# Patient Record
Sex: MALE | Race: WHITE
[De-identification: names, ages, dates, MRNs, and addresses within clinical notes are randomized per-mention and may not be internally consistent; named-entity substitution may affect disease eponyms.]

---

## 2017-05-08 ENCOUNTER — HOSPITAL ENCOUNTER (OUTPATIENT)
Dept: HOSPITAL 41 - JD.SDS | Age: 77
Discharge: HOME | End: 2017-05-08
Attending: SURGERY
Payer: MEDICARE

## 2017-05-08 VITALS — SYSTOLIC BLOOD PRESSURE: 142 MMHG | DIASTOLIC BLOOD PRESSURE: 88 MMHG

## 2017-05-08 DIAGNOSIS — Z85.51: ICD-10-CM

## 2017-05-08 DIAGNOSIS — D12.3: ICD-10-CM

## 2017-05-08 DIAGNOSIS — E78.00: ICD-10-CM

## 2017-05-08 DIAGNOSIS — E11.9: ICD-10-CM

## 2017-05-08 DIAGNOSIS — K44.9: ICD-10-CM

## 2017-05-08 DIAGNOSIS — K21.0: ICD-10-CM

## 2017-05-08 DIAGNOSIS — D50.9: ICD-10-CM

## 2017-05-08 DIAGNOSIS — M54.9: ICD-10-CM

## 2017-05-08 DIAGNOSIS — K57.30: ICD-10-CM

## 2017-05-08 DIAGNOSIS — Z90.49: ICD-10-CM

## 2017-05-08 DIAGNOSIS — G89.29: ICD-10-CM

## 2017-05-08 DIAGNOSIS — I69.398: ICD-10-CM

## 2017-05-08 DIAGNOSIS — I48.91: ICD-10-CM

## 2017-05-08 DIAGNOSIS — E78.5: ICD-10-CM

## 2017-05-08 DIAGNOSIS — K31.89: ICD-10-CM

## 2017-05-08 DIAGNOSIS — Z79.899: ICD-10-CM

## 2017-05-08 DIAGNOSIS — Z98.890: ICD-10-CM

## 2017-05-08 DIAGNOSIS — D12.2: ICD-10-CM

## 2017-05-08 DIAGNOSIS — Z87.891: ICD-10-CM

## 2017-05-08 DIAGNOSIS — K29.50: Primary | ICD-10-CM

## 2017-05-08 DIAGNOSIS — Z79.84: ICD-10-CM

## 2017-05-08 DIAGNOSIS — Z79.01: ICD-10-CM

## 2017-05-08 DIAGNOSIS — I10: ICD-10-CM

## 2017-05-08 DIAGNOSIS — Z86.010: ICD-10-CM

## 2017-05-08 PROCEDURE — 43239 EGD BIOPSY SINGLE/MULTIPLE: CPT

## 2017-05-08 PROCEDURE — 0DB68ZX EXCISION OF STOMACH, VIA NATURAL OR ARTIFICIAL OPENING ENDOSCOPIC, DIAGNOSTIC: ICD-10-PCS | Performed by: SURGERY

## 2017-05-08 PROCEDURE — 0DB98ZX EXCISION OF DUODENUM, VIA NATURAL OR ARTIFICIAL OPENING ENDOSCOPIC, DIAGNOSTIC: ICD-10-PCS | Performed by: SURGERY

## 2017-05-08 PROCEDURE — 45380 COLONOSCOPY AND BIOPSY: CPT

## 2017-05-08 PROCEDURE — 0DBK8ZZ EXCISION OF ASCENDING COLON, VIA NATURAL OR ARTIFICIAL OPENING ENDOSCOPIC: ICD-10-PCS | Performed by: SURGERY

## 2017-05-08 PROCEDURE — 0DBL8ZZ EXCISION OF TRANSVERSE COLON, VIA NATURAL OR ARTIFICIAL OPENING ENDOSCOPIC: ICD-10-PCS | Performed by: SURGERY

## 2017-05-08 PROCEDURE — 88305 TISSUE EXAM BY PATHOLOGIST: CPT

## 2017-05-08 PROCEDURE — 82962 GLUCOSE BLOOD TEST: CPT

## 2017-05-08 PROCEDURE — 45385 COLONOSCOPY W/LESION REMOVAL: CPT

## 2017-05-08 NOTE — PCM.OPNOTE
- General Post-Op/Procedure Note


Date of Surgery/Procedure: 05/08/17


Operative Procedure(s): EGD with bx ./colonosocopy polypectomy and bx


Pre Op Diagnosis: anemia


Post-Op Diagnosis: Same


Anesthesia Technique: MAC


Primary Surgeon: Mike Frias


Complications: None


Condition: Good

## 2017-05-08 NOTE — PCM.PREANE
Preanesthetic Assessment





- Anesthesia/Transfusion/Family Hx


Anesthesia History: Prior Anesthesia Without Reaction


Family History of Anesthesia Reaction: No


Transfusion History: No Prior Transfusion(s)


Type of Transfusion Reactions: Reports: Unknown


Intubation History: Unknown





- Review of Systems


General: No Symptoms


Pulmonary: No Symptoms


Cardiovascular: Other (HTN, HLD, AFIB)


Gastrointestinal: No symptoms


Neurological: No Symptoms, Other (hx of stroke with no deficits )


Other: Reports: None, Diabetes





- Physical Assessment


NPO Status Date: 05/07/17


NPO Status Time: 18:30


Pulse: 89


O2 Sat by Pulse Oximetry: 95


Respiratory Rate: 16


Blood Pressure: 150/79


Temperature: 36.2 C


Height: 1.68 m


Weight: 71.214 kg


ASA Class: 3


Mental Status: Alert & Oriented x3


Dentition: Reports: Normal Dentition


Thyro-Mental Finger Breadths: 3


Mouth Opening Finger Breadths: 3


ROM/Head Extension: Full


Lungs: Clear to auscultation, Normal respiratory effort


Cardiovascular: No Murmurs, Irregular Rhythm (afib)





- Lab


Values: 





 Laboratory Last Values











POC Glucose  126 mg/dL ()  H  05/08/17  07:23    














- Allergies


Allergies/Adverse Reactions: 


 Allergies











Allergy/AdvReac Type Severity Reaction Status Date / Time


 


No Known Allergies Allergy   Verified 01/29/16 15:09














- Blood


Blood Available: No


Product(s) Available: None





- Anesthesia Plan


Pre-Op Medication Ordered: None


Beta Blocker: Metoprolol (instructed to take metoprolol when he gets home)


Med Last Dose Date: 05/07/17


Med Last Dose Time: 08:00





- Acknowledgements


Anesthesia Type Planned: MAC


Pt an Appropriate Candidate for the Planned Anesthesia: Yes


Alternatives and Risks of Anesthesia Discussed w Pt/Guardian: Yes


Pt/Guardian Understands and Agrees with Anesthesia Plan: Yes





PreAnesthesia Questionnaire


Cardiovascular History: Reports: High cholesterol, Hypertension


Gastrointestinal History: Reports: GERD


Musculoskeletal History: Reports: Back pain, chronic


Psychiatric History: Reports: Depression


Endocrine/Metabolic History: Reports: Diabetes, type II


Oncologic (Cancer) History: Reports: Bladder





- Past Surgical History


GI Surgical History: Reports: Colonoscopy, Hernia, inguinal





- SUBSTANCE USE


Smoking Status *Q: Former Smoker


Tobacco Use Within Last Twelve Months: Cigarettes


Recreational Drug Use History: No





- HOME MEDS


Home Medications: 


 Home Meds





Gabapentin [Neurontin] 300 mg PO BEDTIME 08/20/15 [History]


Metoprolol Succinate [Toprol XL] 100 mg PO DAILY 08/20/15 [History]


Omeprazole [Prilosec] 20 mg PO DAILY 08/20/15 [History]


Simvastatin [Zocor] 80 mg PO BEDTIME 08/20/15 [History]


metFORMIN [Glucophage] 1,000 mg PO BID 08/20/15 [History]


Rivaroxaban [Xarelto] 20 mg PO QAM #30 tablet 01/23/16 [Rx]











- CURRENT (IN HOUSE) MEDS


Current Meds: 





 Current Medications





Lactated Ringer's (Ringers, Lactated)  1,000 mls @ 125 mls/hr IV ASDIRECTED RODRIGO


   Stop: 05/08/17 23:00


Lidocaine/Sodium Bicarbonate (Buffered Lidocaine 1% In Ns 8.4%)  0.25 ml IV 

ONETIME PRN


   PRN Reason: Prior to IV Start


   Stop: 05/08/17 18:00


Sodium Chloride (Saline Flush)  10 ml FLUSH ASDIRECTED PRN


   PRN Reason: Keep Vein Open


   Stop: 05/08/17 18:00

## 2017-05-09 NOTE — OR
DATE OF OPERATION:  05/08/2017

 

SURGEON:  Mike Frias MD

 

PREOPERATIVE DIAGNOSIS:

History of colonic polyps, anemia.

 

POSTOPERATIVE DIAGNOSIS:

History of colonic polyps, anemia.

 

OPERATION PERFORMED:

1. Colonoscopy to the cecum.

2. Polypectomy x3 with biopsy of the larger polyps.  All polyps were

    completely removed and all polyps that were removed were completely removed

    and retrieved.

 

FINDINGS:

A sigmoid diverticulosis, a subcentimeter polyp in the ascending colon, 1 in the

transverse colon and the second also in the transverse colon, and a third was at

the splenic flexure.  This was a polyp draped over a fold, it was sessile,

encompassing half the diameter of the colon.

 

ANESTHESIA:

The procedure was done under IV sedation.

 

DESCRIPTION OF PROCEDURE:

The patient was taken to the operating room, placed in a supine position,

connected to monitoring equipment, and given IV sedation.  Upper GI endoscopy

was done and IV sedation continued for colonoscopy.  He was placed in left

lateral position.  The perianal area was inspected and was normal.  Rectal exam

showed good sphincter tone.  A Video Olympus colonoscope was introduced into the

rectum and threaded up without problem to the cecum, where the appendicular

orifice was seen.  Prep was adequate.  [place] [employer] score grade B

throughout the colon.  The scope was slowly withdrawn showing the cecum,

ascending colon, transverse colon, descending colon, sigmoid colon, and rectum.

A small diminutive polyp was noted at the ascending colon and this was removed

by cold biopsy forceps.  A second polyp was noted in the transverse colon, it

was subcentimeter and this was removed by cautery snare.  A small clip was

placed on the polypectomy site.  The second polyp was also more distal in the

transverse colon.  This was removed by cautery snare and retrieved as the other

polyp was.  Finally, a fourth polyp was noted in the splenic flexure.  It was

quite large encompassing about a half the diameter of the lumen of the colon and

it appeared to have indentation in the center.  It was elected to biopsy the

lesion.  This was sent to pathology in a labeled container.  The patient

tolerated procedure and was sent to recovery room in a stable condition.  All

polyps sent in the labeled container and the patient will be followed up in the

clinic.

 

ESTIMATED BLOOD LOSS:

 

 

DD:  05/08/2017 09:25:37

DT:  05/09/2017 01:38:05  MMODAL

Job #:  [id number]

## 2017-05-09 NOTE — OR
DATE OF OPERATION:  05/08/2017

 

SURGEON:  Mike Frias MD

 

PREOPERATIVE DIAGNOSIS:

Anemia.

 

POSTOPERATIVE DIAGNOSIS:

Anemia.

 

OPERATION PERFORMED:

Esophagogastroduodenoscopy with biopsy.

 

FINDINGS:

The second portion of the duodenum, duodenal bulb, pyloric channel normal.

Antrum showed a rope-like ridge of tissue, which was biopsied.  Some chronic

esophagitis was noted in the antrum.  J-maneuver showed a sliding hiatal hernia

with GE junction located at 38 cm with evidence of chronic esophagitis.  The Z-

line; however, was sharp.  There is tissue gastric contents unusual in nature

that was in the fundus and this was biopsied and sent separately.  The rest

esophagus was unremarkable as the scope was withdrawn.

 

DESCRIPTION OF PROCEDURE:

The patient was taken to the operating room, placed in the supine position,

connected to monitoring equipment, given IV sedation, and placed in left lateral

position.  Bite block was inserted.  Video Olympus gastroscope placed in a

posterior oropharynx.  Under direct vision, threaded past the cricopharyngeus,

down the esophagus, into the stomach.  The stomach was insufflated.  The scope

passed through the pylorus and the second portion of the duodenum.  Second

portion of the duodenum, duodenal bulb was unremarkable as was the pyloric

channel.  There is a rope of tissue that extended along the lesser curvature,

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  The

antrum showed just blunting of the mucosa suggesting chronic gastritis.  J-

maneuver was performed showing hiatal hernia.  There was some tissue up in the

fundus, which was unidentifiable.  It looked like it was part of the gastric

contents, this was biopsied.  The scope was withdrawn at the GE junction, which

was viewed as described above and the rest of the esophagus was viewed as the

scope withdrawn.  The patient tolerated the procedure.  Specimens sent to

pathology in a labeled container and IV sedation will be continued for

colonoscopy.

 

ANESTHESIA:

MAC.

 

ESTIMATED BLOOD LOSS:

 

 

DD:  05/08/2017 08:30:53

DT:  05/08/2017 19:07:32  MMODAL

Job #:  [id number]

## 2017-06-10 ENCOUNTER — HOSPITAL ENCOUNTER (EMERGENCY)
Dept: HOSPITAL 41 - JD.ED | Age: 77
LOS: 1 days | Discharge: HOME | End: 2017-06-11
Payer: MEDICARE

## 2017-06-10 DIAGNOSIS — E78.00: ICD-10-CM

## 2017-06-10 DIAGNOSIS — Z79.84: ICD-10-CM

## 2017-06-10 DIAGNOSIS — E11.9: ICD-10-CM

## 2017-06-10 DIAGNOSIS — Z87.891: ICD-10-CM

## 2017-06-10 DIAGNOSIS — I48.91: ICD-10-CM

## 2017-06-10 DIAGNOSIS — K21.9: ICD-10-CM

## 2017-06-10 DIAGNOSIS — R07.9: Primary | ICD-10-CM

## 2017-06-10 DIAGNOSIS — I10: ICD-10-CM

## 2017-06-10 DIAGNOSIS — G89.29: ICD-10-CM

## 2017-06-10 DIAGNOSIS — Z79.899: ICD-10-CM

## 2017-06-10 DIAGNOSIS — M54.9: ICD-10-CM

## 2017-06-10 PROCEDURE — 96361 HYDRATE IV INFUSION ADD-ON: CPT

## 2017-06-10 PROCEDURE — 85730 THROMBOPLASTIN TIME PARTIAL: CPT

## 2017-06-10 PROCEDURE — 99285 EMERGENCY DEPT VISIT HI MDM: CPT

## 2017-06-10 PROCEDURE — 84484 ASSAY OF TROPONIN QUANT: CPT

## 2017-06-10 PROCEDURE — 93005 ELECTROCARDIOGRAM TRACING: CPT

## 2017-06-10 PROCEDURE — 85610 PROTHROMBIN TIME: CPT

## 2017-06-10 PROCEDURE — 81001 URINALYSIS AUTO W/SCOPE: CPT

## 2017-06-10 PROCEDURE — 36415 COLL VENOUS BLD VENIPUNCTURE: CPT

## 2017-06-10 PROCEDURE — 85025 COMPLETE CBC W/AUTO DIFF WBC: CPT

## 2017-06-10 PROCEDURE — 71010: CPT

## 2017-06-10 PROCEDURE — 96360 HYDRATION IV INFUSION INIT: CPT

## 2017-06-10 PROCEDURE — 80053 COMPREHEN METABOLIC PANEL: CPT

## 2017-06-10 PROCEDURE — P9612 CATHETERIZE FOR URINE SPEC: HCPCS

## 2017-06-10 RX ADMIN — NITROGLYCERIN PRN MG: 0.4 TABLET SUBLINGUAL at 20:08

## 2017-06-10 RX ADMIN — NITROGLYCERIN PRN MG: 0.4 TABLET SUBLINGUAL at 20:14

## 2017-06-10 RX ADMIN — NITROGLYCERIN PRN MG: 0.4 TABLET SUBLINGUAL at 20:21

## 2017-06-11 VITALS — SYSTOLIC BLOOD PRESSURE: 162 MMHG | DIASTOLIC BLOOD PRESSURE: 85 MMHG

## 2017-06-12 NOTE — CR
Chest: Frontal view of the chest was obtained.

 

Comparison: No prior chest x-ray.

 

Heart is enlarged.  Pulmonary vessels are mildly congested.  Lungs 

otherwise are clear.  Bony structures are grossly intact.  Probable 

bilateral chronic rotator cuff tears.

 

Impression:

1.  Cardiomegaly with mild pulmonary vascular congestion.

2.  Incidental bone findings.

 

Diagnostic code #3

## 2017-06-20 ENCOUNTER — HOSPITAL ENCOUNTER (EMERGENCY)
Dept: HOSPITAL 41 - JD.ED | Age: 77
Discharge: HOME | End: 2017-06-20
Payer: MEDICARE

## 2017-06-20 VITALS — DIASTOLIC BLOOD PRESSURE: 97 MMHG | SYSTOLIC BLOOD PRESSURE: 168 MMHG

## 2017-06-20 DIAGNOSIS — I11.0: Primary | ICD-10-CM

## 2017-06-20 DIAGNOSIS — E78.00: ICD-10-CM

## 2017-06-20 DIAGNOSIS — N41.0: ICD-10-CM

## 2017-06-20 DIAGNOSIS — Z90.49: ICD-10-CM

## 2017-06-20 DIAGNOSIS — K21.9: ICD-10-CM

## 2017-06-20 DIAGNOSIS — E11.9: ICD-10-CM

## 2017-06-20 DIAGNOSIS — I50.9: ICD-10-CM

## 2017-06-20 DIAGNOSIS — Z79.84: ICD-10-CM

## 2017-06-20 DIAGNOSIS — F32.9: ICD-10-CM

## 2017-06-20 DIAGNOSIS — J44.9: ICD-10-CM

## 2017-06-20 DIAGNOSIS — F17.210: ICD-10-CM

## 2017-06-20 DIAGNOSIS — Z79.899: ICD-10-CM

## 2017-06-20 PROCEDURE — 83690 ASSAY OF LIPASE: CPT

## 2017-06-20 PROCEDURE — 96361 HYDRATE IV INFUSION ADD-ON: CPT

## 2017-06-20 PROCEDURE — 84484 ASSAY OF TROPONIN QUANT: CPT

## 2017-06-20 PROCEDURE — 74177 CT ABD & PELVIS W/CONTRAST: CPT

## 2017-06-20 PROCEDURE — 71020: CPT

## 2017-06-20 PROCEDURE — 96374 THER/PROPH/DIAG INJ IV PUSH: CPT

## 2017-06-20 PROCEDURE — 81001 URINALYSIS AUTO W/SCOPE: CPT

## 2017-06-20 PROCEDURE — 85025 COMPLETE CBC W/AUTO DIFF WBC: CPT

## 2017-06-20 PROCEDURE — 36415 COLL VENOUS BLD VENIPUNCTURE: CPT

## 2017-06-20 PROCEDURE — 80053 COMPREHEN METABOLIC PANEL: CPT

## 2017-06-20 PROCEDURE — 83880 ASSAY OF NATRIURETIC PEPTIDE: CPT

## 2017-06-20 PROCEDURE — 93005 ELECTROCARDIOGRAM TRACING: CPT

## 2017-06-20 PROCEDURE — 99285 EMERGENCY DEPT VISIT HI MDM: CPT

## 2017-06-20 NOTE — EDM.PDOC
ED HPI GENERAL MEDICAL PROBLEM





- General


Chief Complaint: Abdominal Pain


Stated Complaint: ABDOMINAL PAINS


Time Seen by Provider: 06/20/17 19:39


Source of Information: Reports: Patient, Family (Son), RN Notes Reviewed


History Limitations: Reports: Other (The patient is a poor historian. Possible 

dementia.)





- History of Present Illness


INITIAL COMMENTS - FREE TEXT/NARRATIVE: 





The patient states that he has had upper abdominal pain since this morning, but 

then indicated with his hand the location of his pain was his chest. He is 

unable to describe the character of the pain and he states that there are no 

modifiers. He believes that it is been waxing and waning. He believes that he 

has had similar pain on and off for the past couple of months.





He denies recent fever, but states that he has had nausea or vomiting. No 

recent constipation, diarrhea, or urinary symptoms.





The patient cannot recall who his PCP is.


  ** left upper abdomen/chest area


Pain Score (Numeric/FACES): 10





- Related Data


 Allergies











Allergy/AdvReac Type Severity Reaction Status Date / Time


 


No Known Allergies Allergy   Verified 06/20/17 19:50











Home Meds: 


 Home Meds





Gabapentin [Neurontin] 300 mg PO BEDTIME 08/20/15 [History]


Metoprolol Succinate [Toprol XL] 100 mg PO DAILY 08/20/15 [History]


Omeprazole [Prilosec] 20 mg PO DAILY 08/20/15 [History]


Simvastatin [Zocor] 80 mg PO BEDTIME 08/20/15 [History]


metFORMIN [Glucophage] 1,000 mg PO BID 08/20/15 [History]


Rivaroxaban [Xarelto] 20 mg PO QAM #30 tablet 01/23/16 [Rx]


Sulfamethoxazole/Trimethoprim [Bactrim Ds Tablet] 1 tab PO Q12H #12 tablet 06/20 /17 [Rx]











Past Medical History


HEENT History: Reports: Impaired Vision


Cardiovascular History: Reports: Heart Failure, High Cholesterol, Hypertension


Respiratory History: Reports: COPD


Gastrointestinal History: Reports: GERD


Musculoskeletal History: Reports: Back Pain, Chronic


Psychiatric History: Reports: Depression


Endocrine/Metabolic History: Reports: Diabetes, Type II


Oncologic (Cancer) History: Reports: Bladder





- Past Surgical History


GI Surgical History: Reports: Appendectomy, Cholecystectomy, Colonoscopy, Hernia

, Inguinal


Musculoskeletal Surgical History: Reports: Shoulder Surgery (left)





Social & Family History





- Family History


Family Medical History: Noncontributory





- Tobacco Use


Smoking Status *Q: Current Every Day Smoker


Years of Tobacco use: 60


Packs/Tins Daily: 0.2


Packs/Tins Daily Comment: Down from 1 ppd





- Caffeine Use


Caffeine Use: Reports: Coffee





- Alcohol Use


Alcohol Use History: Yes


Alcohol Use Frequency: Rarely





- Recreational Drug Use


Recreational Drug Use: No





- Living Situation & Occupation


Living situation: Reports: , Alone


Occupation: Retired





ED ROS GENERAL





- Review of Systems


Review Of Systems: See Below


Constitutional: Reports: No Symptoms


HEENT: Reports: No Symptoms


Respiratory: Reports: No Symptoms


Cardiovascular: Reports: No Symptoms


Endocrine: Reports: No Symptoms


GI/Abdominal: Reports: No Symptoms


: Reports: No Symptoms


Musculoskeletal: Reports: No Symptoms


Skin: Reports: No Symptoms


Neurological: Reports: No Symptoms


Psychiatric: Reports: No Symptoms


Hematologic/Lymphatic: Reports: No Symptoms


Immunologic: Reports: No Symptoms





ED EXAM, GENERAL





- Physical Exam


Exam: See Below


Exam Limited By: No Limitations


General Appearance: Alert, WD/WN, No Apparent Distress


Eye Exam: Bilateral Eye: Normal Inspection


Ears: Normal External Exam, Hearing Grossly Normal


Nose: Normal Inspection, No Blood


Throat/Mouth: Normal Inspection, Normal Lips, Normal Voice, No Airway Compromise


Head: Atraumatic, Normocephalic


Neck: Normal Inspection, Full Range of Motion


Respiratory/Chest: No Respiratory Distress, Lungs Clear, Normal Breath Sounds, 

No Accessory Muscle Use, Chest Non-Tender


Cardiovascular: Normal Peripheral Pulses, Regular Rate, Rhythm, No Edema, No 

Gallop, No JVD, No Murmur, No Rub


Peripheral Pulses: 4+: Radial (L), Radial (R)


GI/Abdominal: Normal Bowel Sounds, Soft, Non-Tender, No Organomegaly, No 

Distention, No Abnormal Bruit, No Mass


Back Exam: Normal Inspection, Full Range of Motion.  No: CVA Tenderness (L), 

CVA Tenderness (R)


Extremities: Normal Inspection, Normal Range of Motion, No Pedal Edema, Normal 

Capillary Refill


Neurological: Alert, No Motor/Sensory Deficits, Memory Loss Recent Events


Psychiatric: Flat Affect


Skin Exam: Warm, Dry, Intact, Normal Color, No Rash


Lymphatic: No Adenopathy





EKG INTERPRETATION


EKG Date: 06/20/17


Time: 19:54


Rhythm: A-Fib


Rate (Beats/Min): 82


Axis: Normal


P-Wave: Absent


QRS: Normal


ST-T: Normal


QT: Normal


Comparison: No Change (6/10/2017)





Course





- Vital Signs


Last Recorded V/S: 


 Last Vital Signs











Temp  36.7 C   06/20/17 19:39


 


Pulse  86   06/20/17 19:39


 


Resp  28 H  06/20/17 19:39


 


BP  168/97 H  06/20/17 19:39


 


Pulse Ox  94 L  06/20/17 19:39














- Orders/Labs/Meds


Orders: 


 Active Orders 24 hr











 Category Date Time Status


 


 EKG Documentation Completion [RC] STAT Care  06/20/17 19:57 Active


 


 Abdomen Pelvis w Cont [CT] Stat Exams  06/20/17 20:05 Taken


 


 Chest 2V [CR] Stat Exams  06/20/17 20:06 Taken


 


 Sodium Chloride 0.9% [Normal Saline] 1,000 ml Med  06/20/17 20:15 Active





 IV ASDIRECTED   








 Medication Orders





Sodium Chloride (Normal Saline)  1,000 mls @ 150 mls/hr IV ASDIRECTED RODRIGO


   Last Admin: 06/20/17 20:24  Dose: 150 mls/hr








Labs: 


 Laboratory Tests











  06/20/17 06/20/17 06/20/17 Range/Units





  20:05 20:05 20:07 


 


WBC  11.36 H    (4.23-9.07)  K/mm3


 


RBC  5.63    (4.63-6.08)  M/mm3


 


Hgb  10.3 L    (13.7-17.5)  gm/L


 


Hct  34.0 L    (40.1-51.0)  %


 


MCV  60.4 L    (79.0-92.2)  fl


 


MCH  18.3 L    (25.7-32.2)  pg


 


MCHC  30.3 L    (32.2-35.5)  g/dl


 


RDW Std Deviation  44.8 H    (35.1-43.9)  fL


 


Plt Count  496 H    (163-337)  K/mm3


 


MPV  8.2 L    (9.4-12.3)  fl


 


Neutrophils % (Manual)  70 H    (40-60)  %


 


Band Neutrophils %  0    (0-10)  %


 


Lymphocytes % (Manual)  23    (20-40)  %


 


Atypical Lymphs %  0    %


 


Monocytes % (Manual)  3    (2-10)  %


 


Eosinophils % (Manual)  3    (0.8-7.0)  %


 


Basophils % (Manual)  1    (0.2-1.2)  


 


Platelet Estimate  Adequate    


 


Plt Morphology Comment  Normal    


 


Hypochromasia  2+ moderate    


 


Poikilocytosis  2+ moderate    


 


Anisocytosis  2+ moderate    


 


Microcytosis  3+ marked    


 


RBC Morph Comment  Not Reportable    


 


Sodium   137   (136-145)  mEq/L


 


Potassium   3.8   (3.5-5.1)  mEq/L


 


Chloride   101   ()  mEq/L


 


Carbon Dioxide   25   (21-32)  mEq/L


 


Anion Gap   14.8   (5-15)  


 


BUN   20 H   (7-18)  mg/dL


 


Creatinine   1.3   (0.7-1.3)  mg/dL


 


Est Cr Clr Drug Dosing   TNP   


 


Estimated GFR (MDRD)   53   (>60)  mL/min


 


BUN/Creatinine Ratio   15.4   (14-18)  


 


Glucose   108   ()  mg/dL


 


Calcium   9.2   (8.5-10.1)  mg/dL


 


Total Bilirubin   1.5 H   (0.2-1.0)  mg/dL


 


AST   14 L   (15-37)  U/L


 


ALT   17   (16-63)  U/L


 


Alkaline Phosphatase   113   ()  U/L


 


Troponin I   < 0.017   (0.00-0.056)  ng/mL


 


B-Natriuretic Peptide    812 H  (0-100)  pg/mL


 


Total Protein   8.5 H   (6.4-8.2)  g/dl


 


Albumin   3.8   (3.4-5.0)  g/dl


 


Globulin   4.7   gm/dL


 


Albumin/Globulin Ratio   0.8 L   (1-2)  


 


Lipase   83   ()  U/L


 


Urine Color     (Yellow)  


 


Urine Appearance     (Clear)  


 


Urine pH     (5.0-8.0)  


 


Ur Specific Gravity     (1.005-1.030)  


 


Urine Protein     (Negative)  


 


Urine Glucose (UA)     (Negative)  


 


Urine Ketones     (Negative)  


 


Urine Occult Blood     (Negative)  


 


Urine Nitrite     (Negative)  


 


Urine Bilirubin     (Negative)  


 


Urine Urobilinogen     (0.2-1.0)  


 


Ur Leukocyte Esterase     (Negative)  


 


Urine RBC     (0-5)  /hpf


 


Urine WBC     (0-5)  /hpf


 


Ur Epithelial Cells     (0-5)  /hpf


 


Urine Bacteria     (FEW)  /hpf


 


Urine Mucus     (FEW)  /hpf














  06/20/17 Range/Units





  22:40 


 


WBC   (4.23-9.07)  K/mm3


 


RBC   (4.63-6.08)  M/mm3


 


Hgb   (13.7-17.5)  gm/L


 


Hct   (40.1-51.0)  %


 


MCV   (79.0-92.2)  fl


 


MCH   (25.7-32.2)  pg


 


MCHC   (32.2-35.5)  g/dl


 


RDW Std Deviation   (35.1-43.9)  fL


 


Plt Count   (163-337)  K/mm3


 


MPV   (9.4-12.3)  fl


 


Neutrophils % (Manual)   (40-60)  %


 


Band Neutrophils %   (0-10)  %


 


Lymphocytes % (Manual)   (20-40)  %


 


Atypical Lymphs %   %


 


Monocytes % (Manual)   (2-10)  %


 


Eosinophils % (Manual)   (0.8-7.0)  %


 


Basophils % (Manual)   (0.2-1.2)  


 


Platelet Estimate   


 


Plt Morphology Comment   


 


Hypochromasia   


 


Poikilocytosis   


 


Anisocytosis   


 


Microcytosis   


 


RBC Morph Comment   


 


Sodium   (136-145)  mEq/L


 


Potassium   (3.5-5.1)  mEq/L


 


Chloride   ()  mEq/L


 


Carbon Dioxide   (21-32)  mEq/L


 


Anion Gap   (5-15)  


 


BUN   (7-18)  mg/dL


 


Creatinine   (0.7-1.3)  mg/dL


 


Est Cr Clr Drug Dosing   


 


Estimated GFR (MDRD)   (>60)  mL/min


 


BUN/Creatinine Ratio   (14-18)  


 


Glucose   ()  mg/dL


 


Calcium   (8.5-10.1)  mg/dL


 


Total Bilirubin   (0.2-1.0)  mg/dL


 


AST   (15-37)  U/L


 


ALT   (16-63)  U/L


 


Alkaline Phosphatase   ()  U/L


 


Troponin I   (0.00-0.056)  ng/mL


 


B-Natriuretic Peptide   (0-100)  pg/mL


 


Total Protein   (6.4-8.2)  g/dl


 


Albumin   (3.4-5.0)  g/dl


 


Globulin   gm/dL


 


Albumin/Globulin Ratio   (1-2)  


 


Lipase   ()  U/L


 


Urine Color  Yellow  (Yellow)  


 


Urine Appearance  Clear  (Clear)  


 


Urine pH  6.0  (5.0-8.0)  


 


Ur Specific Gravity  1.020  (1.005-1.030)  


 


Urine Protein  2+ H  (Negative)  


 


Urine Glucose (UA)  Negative  (Negative)  


 


Urine Ketones  Negative  (Negative)  


 


Urine Occult Blood  Negative  (Negative)  


 


Urine Nitrite  Negative  (Negative)  


 


Urine Bilirubin  Negative  (Negative)  


 


Urine Urobilinogen  1.0  (0.2-1.0)  


 


Ur Leukocyte Esterase  Negative  (Negative)  


 


Urine RBC  0-5  (0-5)  /hpf


 


Urine WBC  0-5  (0-5)  /hpf


 


Ur Epithelial Cells  0-5  (0-5)  /hpf


 


Urine Bacteria  Not seen  (FEW)  /hpf


 


Urine Mucus  Not seen  (FEW)  /hpf











Meds: 


Medications











Generic Name Dose Route Start Last Admin





  Trade Name Freq  PRN Reason Stop Dose Admin


 


Sodium Chloride  1,000 mls @ 150 mls/hr  06/20/17 20:15  06/20/17 20:24





  Normal Saline  IV   150 mls/hr





  ASDIRECTED RODRIGO   Administration














Discontinued Medications














Generic Name Dose Route Start Last Admin





  Trade Name Freq  PRN Reason Stop Dose Admin


 


Iopamidol  100 ml  06/20/17 22:30  06/20/17 22:30





  Isovue-370 (76%)  IVPUSH  06/20/17 22:31  100 ml





  ONETIME ONE   Administration


 


Ondansetron HCl  4 mg  06/20/17 20:04  06/20/17 20:24





  Zofran  IVPUSH  06/20/17 20:05  4 mg





  ONETIME ONE   Administration














- Radiology Interpretation


Free Text/Narrative:: 





Two-view chest radiograph reviewed.  Cardiac silhouette is at the upper limits 

of normal.  Mild pulmonary vascular congestion, with trace fluid noted within 

the fissures.  No pleural effusions.  No focal infiltrate.  No pneumothorax.  

Formal read per the Radiologist pending.








CT of the abdomen and pelvis with oral and IV contrast is read by [facility] as:


1. Prostatomegaly, potentially prostatitis.


2. Moderate cardiomegaly.





- Re-Assessments/Exams


Free Text/Narrative Re-Assessment/Exam: 





06/20/17 22:50


Notified by the nurse that the patient's daughter telephoned, stating that she 

believes the patient may have dementia. She stated that he is not taking good 

care of himself at home.








06/20/17 23:11


The patient has a mildly elevated WBC count of 11.36, but no bandemia. His 

total bilirubin is mildly elevated at 1.5, but was normal at 0.9 just 10 days 

ago, on 6/10/2017, and the remainder of his compress metabolic panel is normal. 

His CT scan does not suggest biliary disease.





The patient's BNP is modestly elevated at 812, and his chest radiograph 

suggests slight CHF. I would like to treat with Lasix, however, doing so at 

this time would mean that he is up all night urinating.





The patient's CT scan finds prostatomegaly, with possible prostatitis. I 

believe that it would be prudent to treat the patient for prostatitis with oral 

Bactrim. Levaquin would be relatively contraindicated due to mental status 

changes.








06/20/17 23:22


Test results discussed with the patient and his son. I will discharge the 

patient home, but I have advised that they follow-up with the patient's PCP at 

next available appointment.





Departure





- Departure


Time of Disposition: 23:24


Disposition: Home, Self-Care 01


Condition: Fair


Clinical Impression: 


 Acute prostatitis, Congestive heart failure








- Discharge Information


Referrals: 


PCP,Unknown [Primary Care Provider] - 


Forms:  ED Department Discharge


Additional Instructions: 


Mr. Rush was seen in the emergency room for abdominal pain.





Workup included blood work, a urinalysis, an ECG, a chest x-ray, and a CT scan 

of his abdomen and pelvis.





His workup showed that he is in atrial fibrillation, rate controlled, that he 

has mild congestive heart failure, and that he likely has prostatitis.





He has been started on the antibiotic Bactrim. He is to take 1 tablet every 12 

hours, as prescribed. He should finish the entire prescription unless told 

otherwise by his doctor.





He should follow-up with his doctor at the next available appointment.





He would likely benefit from a diuretic, such as Lasix. This should be done 

under the care of his doctor.





We are concerned that he may have dementia. Consideration should be given to 

nursing home placement.





If any other problems, please do not hesitate to return him to the ER.





- My Orders


Last 24 Hours: 


My Active Orders





06/20/17 19:57


EKG Documentation Completion [RC] STAT 





06/20/17 20:05


Abdomen Pelvis w Cont [CT] Stat 





06/20/17 20:06


Chest 2V [CR] Stat 





06/20/17 20:15


Sodium Chloride 0.9% [Normal Saline] 1,000 ml IV ASDIRECTED 














- Assessment/Plan


Last 24 Hours: 


My Active Orders





06/20/17 19:57


EKG Documentation Completion [RC] STAT 





06/20/17 20:05


Abdomen Pelvis w Cont [CT] Stat 





06/20/17 20:06


Chest 2V [CR] Stat 





06/20/17 20:15


Sodium Chloride 0.9% [Normal Saline] 1,000 ml IV ASDIRECTED

## 2017-06-21 NOTE — CT
CT abdomen and pelvis

 

Technique: Multiple axial sections were obtained from above the dome 

of the diaphragm inferiorly through the pubic symphysis.  Intravenous 

and oral contrast was given.  Delayed images were also obtained 

through the abdomen and pelvis.

 

Comparison: No previous abdominal imaging.

 

Findings: Visualized lung bases show interstitial change most likely 

representing mild fibrosis.  Heart is enlarged.  Liver shows no focal 

abnormality.  Contrast seen within the distal esophagus compatible 

with reflux.  Spleen appears within normal limits.  Adrenal glands 

show no nodule.  Kidneys show symmetric contrast enhancement without 

hydronephrosis.  Small cortical lesion noted within the inferior right

 kidney measuring 8 mm most likely representing a minimal cyst.  Small

 cortical cyst noted within the mid to lower left kidney measuring 9 

mm.

 

Aorta shows diffuse ectasia.  Maximum AP dimension is around 2.4 cm 

distally.  Pancreas is within normal limits.  Surgical clips seen from

 prior cholecystectomy.  Small scattered retroperitoneal lymph nodes 

are seen believed to be within normal limits.  No pelvic mass or 

adenopathy is seen.

 

Prostate gland is slightly enlarged with prostate calcifications.

 

Delayed images show contrast excretion from both kidneys.  No ureteral

 dilatation is seen.  Both distal ureters are opacified and show 

contrast within the bladder.

 

Appendix is not seen with certainty.  No inflammatory change or free 

fluid is seen.

 

Bone window settings were reviewed which show diffuse disc space 

narrowing and endplate osteophytes as well as scoliosis.

 

Impression:

1.  Findings felt to be incidental as noted above.  Nothing acute is 

identified on CT study of the abdomen and pelvis.

 

Diagnostic code #2

 

I agree with preliminary report issued by [employer] ([employer] 

preliminary report dictated on 06/20/17, 11:48 PM Central Time)

## 2017-06-21 NOTE — CR
Chest:  Two views of the chest were obtained.

 

Comparison: Previous chest x-ray of 06/10/17.

 

Heart is mildly enlarged.  Tortuous thoracic aorta is seen.  Lung 

markings are mildly increased which are felt compatible with mild 

chronic pulmonary vascular congestion.  Slight degenerative change is 

noted within the lower thoracic spine.

 

Impression:

1.  Mild cardiomegaly with mild pulmonary vascular congestion likely 

chronic.  Other findings as described above felt to be chronic.

 

Diagnostic code #2

## 2017-07-11 ENCOUNTER — HOSPITAL ENCOUNTER (INPATIENT)
Dept: HOSPITAL 41 - JD.ED | Age: 77
LOS: 3 days | Discharge: NURSING, REHAB OR LONG-TERM CARE | DRG: 64 | End: 2017-07-14
Attending: INTERNAL MEDICINE | Admitting: INTERNAL MEDICINE
Payer: MEDICARE

## 2017-07-11 DIAGNOSIS — Z79.899: ICD-10-CM

## 2017-07-11 DIAGNOSIS — I48.2: ICD-10-CM

## 2017-07-11 DIAGNOSIS — D64.9: ICD-10-CM

## 2017-07-11 DIAGNOSIS — I21.4: ICD-10-CM

## 2017-07-11 DIAGNOSIS — Z79.84: ICD-10-CM

## 2017-07-11 DIAGNOSIS — F32.9: ICD-10-CM

## 2017-07-11 DIAGNOSIS — N18.9: ICD-10-CM

## 2017-07-11 DIAGNOSIS — E11.9: ICD-10-CM

## 2017-07-11 DIAGNOSIS — F17.200: ICD-10-CM

## 2017-07-11 DIAGNOSIS — G81.91: ICD-10-CM

## 2017-07-11 DIAGNOSIS — I11.0: ICD-10-CM

## 2017-07-11 DIAGNOSIS — M54.9: ICD-10-CM

## 2017-07-11 DIAGNOSIS — G89.29: ICD-10-CM

## 2017-07-11 DIAGNOSIS — Z86.73: ICD-10-CM

## 2017-07-11 DIAGNOSIS — R41.0: ICD-10-CM

## 2017-07-11 DIAGNOSIS — Z23: ICD-10-CM

## 2017-07-11 DIAGNOSIS — Z85.51: ICD-10-CM

## 2017-07-11 DIAGNOSIS — E78.5: ICD-10-CM

## 2017-07-11 DIAGNOSIS — I63.9: Primary | ICD-10-CM

## 2017-07-11 DIAGNOSIS — R00.1: ICD-10-CM

## 2017-07-11 DIAGNOSIS — E78.00: ICD-10-CM

## 2017-07-11 DIAGNOSIS — I50.9: ICD-10-CM

## 2017-07-11 DIAGNOSIS — I13.0: ICD-10-CM

## 2017-07-11 DIAGNOSIS — K21.9: ICD-10-CM

## 2017-07-11 DIAGNOSIS — Z79.01: ICD-10-CM

## 2017-07-11 DIAGNOSIS — R29.6: ICD-10-CM

## 2017-07-11 DIAGNOSIS — H91.90: ICD-10-CM

## 2017-07-11 DIAGNOSIS — J44.9: ICD-10-CM

## 2017-07-11 DIAGNOSIS — N42.9: ICD-10-CM

## 2017-07-11 DIAGNOSIS — I48.91: ICD-10-CM

## 2017-07-11 DIAGNOSIS — I50.30: ICD-10-CM

## 2017-07-11 PROCEDURE — 99285 EMERGENCY DEPT VISIT HI MDM: CPT

## 2017-07-11 PROCEDURE — 83735 ASSAY OF MAGNESIUM: CPT

## 2017-07-11 PROCEDURE — 96374 THER/PROPH/DIAG INJ IV PUSH: CPT

## 2017-07-11 PROCEDURE — 80053 COMPREHEN METABOLIC PANEL: CPT

## 2017-07-11 PROCEDURE — 96375 TX/PRO/DX INJ NEW DRUG ADDON: CPT

## 2017-07-11 PROCEDURE — 86140 C-REACTIVE PROTEIN: CPT

## 2017-07-11 PROCEDURE — 83880 ASSAY OF NATRIURETIC PEPTIDE: CPT

## 2017-07-11 PROCEDURE — 93005 ELECTROCARDIOGRAM TRACING: CPT

## 2017-07-11 PROCEDURE — G0009 ADMIN PNEUMOCOCCAL VACCINE: HCPCS

## 2017-07-11 PROCEDURE — 85025 COMPLETE CBC W/AUTO DIFF WBC: CPT

## 2017-07-11 PROCEDURE — 71010: CPT

## 2017-07-11 PROCEDURE — 82550 ASSAY OF CK (CPK): CPT

## 2017-07-11 PROCEDURE — 82553 CREATINE MB FRACTION: CPT

## 2017-07-11 PROCEDURE — 84484 ASSAY OF TROPONIN QUANT: CPT

## 2017-07-11 PROCEDURE — 96376 TX/PRO/DX INJ SAME DRUG ADON: CPT

## 2017-07-11 PROCEDURE — 96361 HYDRATE IV INFUSION ADD-ON: CPT

## 2017-07-11 PROCEDURE — 82962 GLUCOSE BLOOD TEST: CPT

## 2017-07-11 PROCEDURE — 70450 CT HEAD/BRAIN W/O DYE: CPT

## 2017-07-11 PROCEDURE — 85610 PROTHROMBIN TIME: CPT

## 2017-07-11 PROCEDURE — 36415 COLL VENOUS BLD VENIPUNCTURE: CPT

## 2017-07-11 NOTE — CR
Chest: Frontal view of the chest was obtained utilizing portable 

technique.

 

Comparison: Previous chest x-ray of 06/20/17.

 

Heart is enlarged.  Pulmonary vessels are minimally congested with 

upper lobe redistribution.  Findings are not as prominent as seen on 

previous study.  No acute parenchymal change is seen.  Scoliosis is 

noted within the spine.  Surgical clips are seen from prior 

cholecystectomy.

 

Impression:

1.  Cardiomegaly with mild upper lobe pulmonary vascular 

redistribution which is improved from previous exam.

2.  Other incidental findings.

 

Diagnostic code #2

## 2017-07-11 NOTE — MR
MRI brain

 

Technique: T1 sagittal; T2, FLAIR, diffusion and T1 axial

 

Comparison: Previous MRI brain of 01/27/16 and previous head CT study 

of 07/11/17.

 

Findings: Small area of acute diffusion abnormality is seen in 2 

locations within the corona radiata on the left side.  Findings are 

compatible with fairly acute lacunar infarcts within the white matter.

  No other larger areas of diffusion abnormality are seen.  Old 

infarct is noted within the left posterior parietal region.  Diffuse 

increased signal is seen within the periventricular and subcortical 

white matter which is compatible with small vessel ischemic 

demyelination change.  No other abnormal signal is seen within the 

brain parenchyma.  No midline shift or mass effect is seen.  Mucosal 

thickening is seen within portions of the ethmoid sinuses and 

maxillary sinuses.  This sinus disease appears to be chronic.

 

Impression:

1.  Several small lacunar infarcts which are believed to be fairly 

acute are seen on the diffusion sequence within the white matter on 

the left side mostly within the corona radiata.

2.  Other senescent change as described above which appears stable 

from previous exam.

3.  Chronic sinus findings.

 

Diagnostic code #3

## 2017-07-11 NOTE — EDM.PDOC
ED HPI GENERAL MEDICAL PROBLEM





- General


Chief Complaint: Neuro Symptoms/Deficits


Stated Complaint: STROKE SYMPTOMS


Time Seen by Provider: 07/11/17 09:51


Source of Information: Reports: Patient


History Limitations: Reports: No Limitations





- History of Present Illness


INITIAL COMMENTS - FREE TEXT/NARRATIVE: 





77-year-old male brought to the ED for evaluation of impaired speech and 

balance. Patient was noted to be weaker on his right side yesterday. Of note he 

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 

handwriting. Her he has fallen yesterday and he fell again this morning due to 

weakness and being off balance. He is concerned he's had another stroke. 

Reports that he's fallen twice this morning. He did hit his head once but doesn'

t believe that he hurt himself. He states he's disease lightheaded and weak. No 

nausea or vomiting. Speech is slightly dysarthric from his norm. Of note the 

patient is a known type II diabetic controlled with metformin I do not believe 

insulin. Neuro exam is grossly normal however with no outward signs of stroke. 

Seems to be b  a metabolic abnormality. Bedside blood sugar ordered. Patient 

and his old notes is also on Xarelto making him prone to a bleed if he hit his 

head hard when he fell.


Onset: Today


Duration: Minutes:


Location: Reports: Generalized (Weakness and dizziness.)


Quality: Reports: Other


Severity: Moderate (Weak all over and off balance.)


Improves with: Reports: None


Worsens with: Reports: Movement


Context: Denies: Activity, Exercise (Trying to walk.), Lifting, Sick Contact, 

Other


Associated Symptoms: Reports: Malaise, Shortness of Breath, Weakness (

Generalized.).  Denies: Confusion, Chest Pain, Cough, cough w sputum, 

Diaphoresis, Fever/Chills, Headaches, Nausea/Vomiting, Seizure, Syncope





- Related Data


 Allergies











Allergy/AdvReac Type Severity Reaction Status Date / Time


 


No Known Allergies Allergy   Verified 07/11/17 10:35











Home Meds: 


 Home Meds





Gabapentin [Neurontin] 300 mg PO BEDTIME 08/20/15 [History]


Metoprolol Succinate [Toprol XL] 100 mg PO DAILY 08/20/15 [History]


Omeprazole [Prilosec] 20 mg PO DAILY 08/20/15 [History]


Simvastatin [Zocor] 80 mg PO BEDTIME 08/20/15 [History]


metFORMIN [Glucophage] 1,000 mg PO BID 08/20/15 [History]


Rivaroxaban [Xarelto] 20 mg PO QAM #30 tablet 01/23/16 [Rx]











Past Medical History


HEENT History: Reports: Impaired Vision


Cardiovascular History: Reports: Heart Failure, High Cholesterol, Hypertension


Respiratory History: Reports: COPD


Gastrointestinal History: Reports: GERD


Musculoskeletal History: Reports: Back Pain, Chronic


Psychiatric History: Reports: Depression


Endocrine/Metabolic History: Reports: Diabetes, Type II


Oncologic (Cancer) History: Reports: Bladder





- Past Surgical History


GI Surgical History: Reports: Appendectomy, Cholecystectomy, Colonoscopy, Hernia

, Inguinal


Musculoskeletal Surgical History: Reports: Shoulder Surgery (left)





Social & Family History





- Family History


Family Medical History: Noncontributory





- Tobacco Use


Smoking Status *Q: Current Every Day Smoker


Years of Tobacco use: 60


Packs/Tins Daily: 0.2


Used Tobacco, but Quit: No


Second Hand Smoke Exposure: No





- Caffeine Use


Caffeine Use: Reports: Coffee





- Recreational Drug Use


Recreational Drug Use: No





- Living Situation & Occupation


Living situation: Reports: , Alone


Occupation: Retired





ED ROS GENERAL





- Review of Systems


Review Of Systems: See Below


Constitutional: Reports: Malaise, Weakness, Fatigue, Weight Loss.  Denies: Fever

, Chills


HEENT: Reports: Glasses


Respiratory: Reports: Shortness of Breath (Does not have mammography this time.)

.  Denies: Wheezing, Pleuritic Chest Pain, Cough, Sputum, Hemoptysis


Cardiovascular: Reports: Blood Pressure Problem, Dyspnea on Exertion, 

Lightheadedness.  Denies: Chest Pain, Claudication, Edema, Orthopnea (

Hypertension.), Palpitations (Chronically)


Endocrine: Reports: Fatigue


GI/Abdominal: Reports: No Symptoms.  Denies: Constipation, Diarrhea, Nausea, 

Vomiting


: Reports: Frequency, Other


Musculoskeletal: Reports: Back Pain (Nocturia 3), Joint Pain


Skin: Reports: No Symptoms, Bruising (Bruises easily.)


Neurological: Reports: Dizziness, Trouble Speaking, Difficulty Walking, Weakness

, Change in Speech (Mildly dysarthric.), Gait Disturbance.  Denies: Headache, 

Numbness, Syncope, Tingling


Psychiatric: Reports: No Symptoms


Hematologic/Lymphatic: Reports: No Symptoms


Immunologic: Reports: No Symptoms





ED EXAM, NEURO





- Physical Exam


Exam: See Below


Exam Limited By: Other


General Appearance: Alert (Mild dysarthria.), Anxious, Mild Distress


Eye Exam: Bilateral Eye: Normal Inspection, PERRL


Throat/Mouth: Normal Inspection, Normal Lips, Normal Teeth, Normal Oropharynx, 

Other


Head Exam: Atraumatic (Normal bones of the tongue and uvula.), Normocephalic


Neck: Normal Inspection, Supple, Non-Tender, Full Range of Motion.  No: 

Lymphadenopathy (L), Lymphadenopathy (R)


Respiratory/Chest: Respiratory Distress (Mild tachypnea at rest.), Decreased 

Breath Sounds (Breath sounds are mildly diminished to the posterior 20% of lung 

fields.)


Cardiovascular: Regular Rate, Rhythm, No Edema, No Gallop, No Murmur, No Rub.  

No: Normal Peripheral Pulses


GI/Abdominal: Normal Bowel Sounds, Soft, Non-Tender, No Organomegaly


Neurological: Alert, Normal Mood/Affect, Normal Dorsiflexion, CN II-XII Intact, 

Normal Plantar Flexion, Normal Reflexes, No Motor/Sensory Deficits, Oriented x 3

, Babinski (Equivocal because he is so ticklish both toes went up on 

stimulation.).  No: Normal Gait


DTR: 0: Achilles (R), Achilles (L), 1+: Patella (R), Patella (L), 2+: Bicep (R)

, Bicep (L)


Back Exam: Normal Inspection


Extremities: Normal Inspection, Normal Range of Motion, Non-Tender, No Pedal 

Edema, Normal Capillary Refill


Psychiatric: Normal Affect, Normal Mood


Skin Exam: Warm, Dry, Intact, Normal Color, No Rash





Course





- Vital Signs


Last Recorded V/S: 


 Last Vital Signs











Temp  36.7 C   07/11/17 16:14


 


Pulse  81   07/11/17 09:50


 


Resp  20   07/11/17 16:14


 


BP  167/82 H  07/11/17 16:14


 


Pulse Ox  94 L  07/11/17 16:14














- Orders/Labs/Meds


Orders: 


 Active Orders 24 hr











 Category Date Time Status


 


 Blood Glucose Check, Bedside [RC] ONETIME Care  07/11/17 09:53 Active


 


 EKG Documentation Completion [RC] STAT Care  07/11/17 09:51 Active


 


 Diltiazem 125 mg Med  07/11/17 13:45 Active





 Sodium Chloride 0.9% [Normal Saline] 100 ml   





 IV TITRATE   








 Medication Orders





Diltiazem HCl (Cardizem)  30 mg PO Q12HR RODRIGO


   Last Admin: 07/11/17 16:55  Dose: 30 mg


Gabapentin (Neurontin)  300 mg PO BEDTIME RODRIGO


Diltiazem HCl 125 mg/ Sodium (Chloride)  125 mls @ 10 mls/hr IV TITRATE RODRIGO


   PRN Reason: 10 MG/HR


   Last Infusion: 07/11/17 16:13  Dose: 5 mg/hr, 5 mls/hr


   Infusion: 07/11/17 15:25  Dose: 0 mg/hr, 0 mls/hr


   Admin: 07/11/17 14:05  Dose: 10 mg/hr, 10 mls/hr


Insulin Aspart (Novolog)  0 unit SUBCUT QIDACANDBED RODRIGO


   PRN Reason: Protocol


Metoprolol Succinate (Toprol Xl)  100 mg PO DAILY RODRIGO


Pantoprazole Sodium (Protonix***)  40 mg PO DAILY RODRIGO


Rivaroxaban (Xarelto)  20 mg PO QAM RODRIGO


Simvastatin (Zocor)  80 mg PO BEDTIME [facility]








Labs: 


 Laboratory Tests











  07/11/17 07/11/17 07/11/17 Range/Units





  10:04 10:05 10:05 


 


WBC   7.04   (4.23-9.07)  K/mm3


 


RBC   5.48   (4.63-6.08)  M/mm3


 


Hgb   9.9 L   (13.7-17.5)  gm/L


 


Hct   33.0 L   (40.1-51.0)  %


 


MCV   60.2 L   (79.0-92.2)  fl


 


MCH   18.1 L   (25.7-32.2)  pg


 


MCHC   30.0 L   (32.2-35.5)  g/dl


 


RDW Std Deviation   47.1 H   (35.1-43.9)  fL


 


Plt Count   381 H   (163-337)  K/mm3


 


MPV   8.5 L   (9.4-12.3)  fl


 


Neutrophils % (Manual)   74 H   (40-60)  %


 


Band Neutrophils %   1   (0-10)  %


 


Lymphocytes % (Manual)   18 L   (20-40)  %


 


Atypical Lymphs %   0   %


 


Monocytes % (Manual)   3   (2-10)  %


 


Eosinophils % (Manual)   4   (0.8-7.0)  %


 


Basophils % (Manual)   0 L   (0.2-1.2)  


 


Platelet Estimate   Adequate   


 


Polychromasia   1+ slight   


 


Hypochromasia   2+ moderate   


 


Anisocytosis   2+ moderate   


 


Microcytosis   2+ moderate   


 


Target Cells   Few   


 


Tear Drop Cells   Few   


 


RBC Morph Comment   Not Reportable   


 


PT     (8.0-13.0)  SECONDS


 


INR     


 


Sodium    138  (136-145)  mEq/L


 


Potassium    3.6  (3.5-5.1)  mEq/L


 


Chloride    101  ()  mEq/L


 


Carbon Dioxide    28  (21-32)  mEq/L


 


Anion Gap    12.6  (5-15)  


 


BUN    15  (7-18)  mg/dL


 


Creatinine    1.3  (0.7-1.3)  mg/dL


 


Est Cr Clr Drug Dosing    42.26  mL/min


 


Estimated GFR (MDRD)    53  (>60)  mL/min


 


BUN/Creatinine Ratio    11.5 L  (14-18)  


 


Glucose    176 H  ()  mg/dL


 


POC Glucose  202 H    ()  mg/dL


 


Calcium    9.2  (8.5-10.1)  mg/dL


 


Magnesium    1.9  (1.8-2.4)  mg/dl


 


Total Bilirubin    1.3 H  (0.2-1.0)  mg/dL


 


AST    19  (15-37)  U/L


 


ALT    17  (16-63)  U/L


 


Alkaline Phosphatase    94  ()  U/L


 


Creatine Kinase    53  ()  U/L


 


CK-MB (CK-2)    0.6  (0-3.6)  ng/ml


 


Troponin I    0.104 H*  (0.00-0.056)  ng/mL


 


C-Reactive Protein    0.5  (<1.0)  mg/dL


 


B-Natriuretic Peptide     (0-100)  pg/mL


 


Total Protein    8.1  (6.4-8.2)  g/dl


 


Albumin    3.6  (3.4-5.0)  g/dl


 


Globulin    4.5  gm/dL


 


Albumin/Globulin Ratio    0.8 L  (1-2)  














  07/11/17 07/11/17 Range/Units





  10:05 10:05 


 


WBC    (4.23-9.07)  K/mm3


 


RBC    (4.63-6.08)  M/mm3


 


Hgb    (13.7-17.5)  gm/L


 


Hct    (40.1-51.0)  %


 


MCV    (79.0-92.2)  fl


 


MCH    (25.7-32.2)  pg


 


MCHC    (32.2-35.5)  g/dl


 


RDW Std Deviation    (35.1-43.9)  fL


 


Plt Count    (163-337)  K/mm3


 


MPV    (9.4-12.3)  fl


 


Neutrophils % (Manual)    (40-60)  %


 


Band Neutrophils %    (0-10)  %


 


Lymphocytes % (Manual)    (20-40)  %


 


Atypical Lymphs %    %


 


Monocytes % (Manual)    (2-10)  %


 


Eosinophils % (Manual)    (0.8-7.0)  %


 


Basophils % (Manual)    (0.2-1.2)  


 


Platelet Estimate    


 


Polychromasia    


 


Hypochromasia    


 


Anisocytosis    


 


Microcytosis    


 


Target Cells    


 


Tear Drop Cells    


 


RBC Morph Comment    


 


PT  13.7 H   (8.0-13.0)  SECONDS


 


INR  1.24   


 


Sodium    (136-145)  mEq/L


 


Potassium    (3.5-5.1)  mEq/L


 


Chloride    ()  mEq/L


 


Carbon Dioxide    (21-32)  mEq/L


 


Anion Gap    (5-15)  


 


BUN    (7-18)  mg/dL


 


Creatinine    (0.7-1.3)  mg/dL


 


Est Cr Clr Drug Dosing    mL/min


 


Estimated GFR (MDRD)    (>60)  mL/min


 


BUN/Creatinine Ratio    (14-18)  


 


Glucose    ()  mg/dL


 


POC Glucose    ()  mg/dL


 


Calcium    (8.5-10.1)  mg/dL


 


Magnesium    (1.8-2.4)  mg/dl


 


Total Bilirubin    (0.2-1.0)  mg/dL


 


AST    (15-37)  U/L


 


ALT    (16-63)  U/L


 


Alkaline Phosphatase    ()  U/L


 


Creatine Kinase    ()  U/L


 


CK-MB (CK-2)    (0-3.6)  ng/ml


 


Troponin I    (0.00-0.056)  ng/mL


 


C-Reactive Protein    (<1.0)  mg/dL


 


B-Natriuretic Peptide   714 H  (0-100)  pg/mL


 


Total Protein    (6.4-8.2)  g/dl


 


Albumin    (3.4-5.0)  g/dl


 


Globulin    gm/dL


 


Albumin/Globulin Ratio    (1-2)  











Meds: 


Medications











Generic Name Dose Route Start Last Admin





  Trade Name Freq  PRN Reason Stop Dose Admin


 


Diltiazem HCl  30 mg  07/11/17 17:30  07/11/17 16:55





  Cardizem  PO   30 mg





  Q12HR RODRIGO   Administration


 


Gabapentin  300 mg  07/11/17 21:00  





  Neurontin  PO   





  BEDTIME RODRIGO   


 


Diltiazem HCl 125 mg/ Sodium  125 mls @ 10 mls/hr  07/11/17 13:45  07/11/17 16:

13





  Chloride  IV   5 mg/hr





  TITRATE RODRIGO   5 mls/hr





  10 MG/HR   Infusion


 


Insulin Aspart  0 unit  07/11/17 17:00  





  Novolog  SUBCUT   





  QIDACANDBED [facility]   





  Protocol   


 


Metoprolol Succinate  100 mg  07/12/17 09:00  





  Toprol Xl  PO   





  DAILY RODRIGO   


 


Pantoprazole Sodium  40 mg  07/12/17 09:00  





  Protonix***  PO   





  DAILY RODRIGO   


 


Rivaroxaban  20 mg  07/12/17 08:00  





  Xarelto  PO   





  QAM RODRIGO   


 


Simvastatin  80 mg  07/11/17 21:00  





  Zocor  PO   





  BEDTIME RODRIGO   














Discontinued Medications














Generic Name Dose Route Start Last Admin





  Trade Name Freq  PRN Reason Stop Dose Admin


 


Diltiazem HCl  10 mg  07/11/17 13:31  07/11/17 13:37





  Diltiazem  IVPUSH  07/11/17 13:32  10 mg





  ONETIME ONE   Administration


 


Furosemide  40 mg  07/11/17 11:28  07/11/17 11:54





  Lasix  IVPUSH  07/11/17 11:29  40 mg





  NOW ONE   Administration


 


Sodium Chloride  1,000 mls @ 100 mls/hr  07/11/17 10:00  07/11/17 10:19





  Normal Saline  IV   100 mls/hr





  ASDIRECTED RODRIGO   Administration


 


Ondansetron HCl  4 mg  07/11/17 12:29  07/11/17 12:44





  Zofran  IVPUSH  07/11/17 12:30  4 mg





  ONETIME ONE   Administration














- Radiology Interpretation


Free Text/Narrative:: 





77-year-old male presents the ED with generalized weakness loss of balance and 

falling 2. States he did hit his head on one of these falls and these on blood 

thinner Xarelto. His speech is mildly dysarthric but no other abnormality is 

appreciated on gross neurological examination I like a focal neurological 

deficit. Blood sugars to be done at the bedside this is in known type II 

diabetic controlled with metformin to my knowledge she has not started on 

insulin. It appears that he has more of a metabolic abnormality versus 

neurological abnormality. No cord had been called and CT of the head had been 

ordered.





- Re-Assessments/Exams


Free Text/Narrative Re-Assessment/Exam: 





07/11/17 10:06 CT of the brain reveals advanced degenerative changes. There is 

encephalomalacia of both the anterior and posterior horns of the lateral 

ventricles which are grossly dilated. Old lacunar infarct within the left 

posterior basal ganglia parietal lobe.. Diffuse small vessel ischemic changes 

in both basal ganglia. No intracranial hemorrhage or mass effect identified. 

Mild chronic sinusitis within the ethmoid and sphenoid sinuses. Stable from 

previous exam blood sugar at the bedside was reported to be 202.





07/11/17 11:00 ECG reveals atrial  fibrillation with a rate of 66-95/m. There 

are near Q waves in V1 and V2 suggestive of an old anteroseptal myocardial 

infarction. Does have a left ventricular hypertrophy pattern. There is T-wave 

inversion in leads 2 and shortness 3 and aVF. QT is prolonged mildly. No acute 

ischemic changes appreciated.








07/11/17 11:28 labs are back and reveal a normal white count at 7.04 with 74% 

neutrophils and 1% band cells reported. Hemoglobin is low at 9.9 with 

hematocrit of 33.0. Lesser 391,000. PT is 13.7 INR is 1.4. Sodium 138 potassium 

3.6. Chloride is 12.6 creatinine is 1.3 with an EGFR 53 glucose is 176 CK-MB 

fraction 0.6 troponin 0.104 ie.  elevated BNP is elevated at 714. Plan patient 

will be given Lasix 40 mg IV.





07/11/17 11:45 daughters are able to provide some history. He is awaiting GI 

consultation because of his low hemoglobin next week in [place]. Involvement 

today is only 9.9. I find a 29 does not take his medications well. Probably 

takes them 2 days a week. Placed him at risk of stroke because he is in atrial 

fibrillation and if he's not taking the Xarelto on a daily basis. I'm going to 

get him up and see how his balances and gait and try and assess the weakness of 

the right leg this morning.





07/11/17 12:29 is feeling very nauseated at this time dry heaving in his room. 

Suspect was given. To bed was raised. I will order Zofran 4 mg IV.





07/11/17 13:32   He is in chronic atrial fib. Heart rate suddenly has sped up 

to as high as 170/min..  Will give him Cardizem 10 mg IV bolus. Then start drip 

at 10 mg an hour. Dr. Azar hospitalist arrived about the same time that this 

occurred. Therefore decision made to admit him to the intensive care unit 

because of his multiple pole problems and the need now for a Cardizem drip.




















Departure





- Departure


Time of Disposition: 14:50


Disposition: Admitted As Inpatient 65


Condition: Serious


Clinical Impression: 


 Atrial fibrillation with RVR





CVA (cerebral vascular accident)


Qualifiers:


 CVA mechanism: unspecified Qualified Code(s): I63.9 - Cerebral infarction, 

unspecified





Congestive heart failure


Qualifiers:


 Congestive heart failure type: diastolic Congestive heart failure chronicity: 

acute on chronic Qualified Code(s): I50.33 - Acute on chronic diastolic (

congestive) heart failure





Anemia


Qualifiers:


 Anemia type: unspecified type Qualified Code(s): D64.9 - Anemia, unspecified








- Discharge Information





- My Orders


Last 24 Hours: 


My Active Orders





07/11/17 09:51


EKG Documentation Completion [RC] STAT 





07/11/17 09:53


Blood Glucose Check, Bedside [RC] ONETIME 





07/11/17 13:45


Diltiazem 125 mg   Sodium Chloride 0.9% [Normal Saline] 100 ml IV TITRATE 














- Assessment/Plan


Last 24 Hours: 


My Active Orders





07/11/17 09:51


EKG Documentation Completion [RC] STAT 





07/11/17 09:53


Blood Glucose Check, Bedside [RC] ONETIME 





07/11/17 13:45


Diltiazem 125 mg   Sodium Chloride 0.9% [Normal Saline] 100 ml IV TITRATE

## 2017-07-11 NOTE — CT
Head CT

 

Technique: Multiple axial sections through the brain were obtained.  

Intravenous contrast was not utilized.

 

Comparison: Previous MRI brain of 01/27/16 and prior head CT exam of 

01/23/16.

 

Findings: Old infarct is seen within the posterior left parietal 

region.  Diminished density is noted within the periventricular and 

subcortical white matter compatible with small vessel ischemic 

demyelination change.  No other abnormal areas of parenchymal density 

are seen.  No evidence of intracranial hemorrhage.  No midline shift 

or mass effect is seen.  Bone window settings were reviewed which 

shows chronic mucosal thickening within the ethmoid sinuses and 

sphenoid sinus.  No acute calvarial abnormality is seen.

 

Impression:

1.  Mild chronic sinusitis within the ethmoid and sphenoid sinus.  

Findings are fairly stable from prior exam.

2.  Old infarct within the posterior left parietal region.

3.  Other senescent change as described above.

4.  No acute intracranial abnormality is identified on noncontrast 

head CT study.

 

Diagnostic code #2

## 2017-07-12 NOTE — PCM.PN
- General Info


Date of Service: 07/12/17


Functional Status: Reports: tolerating diet, ambulating (unsteady), urinating





- Review of Systems


General: Reports: Weakness


HEENT: Reports: no symptoms


Pulmonary: Reports: shortness of breath


Cardiovascular: Reports: No Symptoms


Gastrointestinal: Reports: No symptoms


Genitourinary: Reports: no symptoms


Musculoskeletal: Reports: no symptoms


Skin: Reports: no symptoms


Neurological: Reports: Confusion (decreased)


Psychiatric: Reports: no symptoms





- Patient Data


Vitals - most recent: 


 Last Vital Signs











Temp  36.5 C   07/12/17 15:08


 


Pulse  53 L  07/12/17 16:19


 


Resp  21 H  07/12/17 16:19


 


BP  130/56 L  07/12/17 16:19


 


Pulse Ox  99   07/12/17 16:19











Weight - most recent: 61.326 kg


I&O - last 24 hours: 


 Intake & Output











 07/12/17 07/12/17 07/12/17





 06:59 14:59 22:59


 


Intake Total 100 360 300


 


Output Total 200 250 50


 


Balance -100 110 250











Lab Results last 24 hrs: 


 Laboratory Results - last 24 hr











  07/11/17 07/12/17 07/12/17 Range/Units





  21:29 05:37 05:37 


 


WBC   9.26 H   (4.23-9.07)  K/mm3


 


RBC   5.00   (4.63-6.08)  M/mm3


 


Hgb   9.1 L   (13.7-17.5)  gm/L


 


Hct   30.6 L   (40.1-51.0)  %


 


MCV   61.2 L   (79.0-92.2)  fl


 


MCH   18.2 L   (25.7-32.2)  pg


 


MCHC   29.7 L   (32.2-35.5)  g/dl


 


RDW Std Deviation   47.8 H   (35.1-43.9)  fL


 


Plt Count   339 H   (163-337)  K/mm3


 


MPV   8.7 L   (9.4-12.3)  fl


 


Neut % (Auto)   75.6 H   (34.0-67.9)  %


 


Lymph % (Auto)   11.8 L   (21.8-53.1)  %


 


Mono % (Auto)   8.2   (5.3-12.2)  %


 


Eos % (Auto)   3.9   (0.8-7.0)  


 


Baso % (Auto)   0.4   (0.1-1.2)  %


 


Neut # (Auto)   7.00 H   (1.78-5.38)  K/mm3


 


Lymph # (Auto)   1.09 L   (1.32-3.57)  K/mm3


 


Mono # (Auto)   0.76   (0.30-0.82)  K/mm3


 


Eos # (Auto)   0.36   (0.04-0.54)  K/mm3


 


Baso # (Auto)   0.04   (0.01-0.08)  K/mm3


 


Manual Slide Review   Abnormal smear   


 


Sodium    140  (136-145)  mEq/L


 


Potassium    3.3 L  (3.5-5.1)  mEq/L


 


Chloride    104  ()  mEq/L


 


Carbon Dioxide    28  (21-32)  mEq/L


 


Anion Gap    11.3  (5-15)  


 


BUN    18  (7-18)  mg/dL


 


Creatinine    1.2  (0.7-1.3)  mg/dL


 


Est Cr Clr Drug Dosing    44.53  mL/min


 


Estimated GFR (MDRD)    59  (>60)  mL/min


 


BUN/Creatinine Ratio    15.0  (14-18)  


 


Glucose    89  ()  mg/dL


 


POC Glucose  117 H    ()  mg/dL


 


Hemoglobin A1c     (4.50-6.20)  %


 


Calcium    9.1  (8.5-10.1)  mg/dL


 


Magnesium    2.0  (1.8-2.4)  mg/dl


 


Troponin I     (0.00-0.056)  ng/mL


 


Triglycerides    52  (<150)  mg/dL


 


Cholesterol    97  (<200)  mg/dL


 


LDL Cholesterol Direct    58  (<100)  mg/dL


 


HDL Cholesterol    35.0 L  (40-59)  mg/dL














  07/12/17 07/12/17 07/12/17 Range/Units





  05:37 05:37 05:39 


 


WBC     (4.23-9.07)  K/mm3


 


RBC     (4.63-6.08)  M/mm3


 


Hgb     (13.7-17.5)  gm/L


 


Hct     (40.1-51.0)  %


 


MCV     (79.0-92.2)  fl


 


MCH     (25.7-32.2)  pg


 


MCHC     (32.2-35.5)  g/dl


 


RDW Std Deviation     (35.1-43.9)  fL


 


Plt Count     (163-337)  K/mm3


 


MPV     (9.4-12.3)  fl


 


Neut % (Auto)     (34.0-67.9)  %


 


Lymph % (Auto)     (21.8-53.1)  %


 


Mono % (Auto)     (5.3-12.2)  %


 


Eos % (Auto)     (0.8-7.0)  


 


Baso % (Auto)     (0.1-1.2)  %


 


Neut # (Auto)     (1.78-5.38)  K/mm3


 


Lymph # (Auto)     (1.32-3.57)  K/mm3


 


Mono # (Auto)     (0.30-0.82)  K/mm3


 


Eos # (Auto)     (0.04-0.54)  K/mm3


 


Baso # (Auto)     (0.01-0.08)  K/mm3


 


Manual Slide Review     


 


Sodium     (136-145)  mEq/L


 


Potassium     (3.5-5.1)  mEq/L


 


Chloride     ()  mEq/L


 


Carbon Dioxide     (21-32)  mEq/L


 


Anion Gap     (5-15)  


 


BUN     (7-18)  mg/dL


 


Creatinine     (0.7-1.3)  mg/dL


 


Est Cr Clr Drug Dosing     mL/min


 


Estimated GFR (MDRD)     (>60)  mL/min


 


BUN/Creatinine Ratio     (14-18)  


 


Glucose     ()  mg/dL


 


POC Glucose    89  ()  mg/dL


 


Hemoglobin A1c  7.20 H    (4.50-6.20)  %


 


Calcium     (8.5-10.1)  mg/dL


 


Magnesium     (1.8-2.4)  mg/dl


 


Troponin I   0.103 H*   (0.00-0.056)  ng/mL


 


Triglycerides     (<150)  mg/dL


 


Cholesterol     (<200)  mg/dL


 


LDL Cholesterol Direct     (<100)  mg/dL


 


HDL Cholesterol     (40-59)  mg/dL














  07/12/17 07/12/17 Range/Units





  11:22 15:48 


 


WBC    (4.23-9.07)  K/mm3


 


RBC    (4.63-6.08)  M/mm3


 


Hgb    (13.7-17.5)  gm/L


 


Hct    (40.1-51.0)  %


 


MCV    (79.0-92.2)  fl


 


MCH    (25.7-32.2)  pg


 


MCHC    (32.2-35.5)  g/dl


 


RDW Std Deviation    (35.1-43.9)  fL


 


Plt Count    (163-337)  K/mm3


 


MPV    (9.4-12.3)  fl


 


Neut % (Auto)    (34.0-67.9)  %


 


Lymph % (Auto)    (21.8-53.1)  %


 


Mono % (Auto)    (5.3-12.2)  %


 


Eos % (Auto)    (0.8-7.0)  


 


Baso % (Auto)    (0.1-1.2)  %


 


Neut # (Auto)    (1.78-5.38)  K/mm3


 


Lymph # (Auto)    (1.32-3.57)  K/mm3


 


Mono # (Auto)    (0.30-0.82)  K/mm3


 


Eos # (Auto)    (0.04-0.54)  K/mm3


 


Baso # (Auto)    (0.01-0.08)  K/mm3


 


Manual Slide Review    


 


Sodium    (136-145)  mEq/L


 


Potassium    (3.5-5.1)  mEq/L


 


Chloride    ()  mEq/L


 


Carbon Dioxide    (21-32)  mEq/L


 


Anion Gap    (5-15)  


 


BUN    (7-18)  mg/dL


 


Creatinine    (0.7-1.3)  mg/dL


 


Est Cr Clr Drug Dosing    mL/min


 


Estimated GFR (MDRD)    (>60)  mL/min


 


BUN/Creatinine Ratio    (14-18)  


 


Glucose    ()  mg/dL


 


POC Glucose  154 H  118 H  ()  mg/dL


 


Hemoglobin A1c    (4.50-6.20)  %


 


Calcium    (8.5-10.1)  mg/dL


 


Magnesium    (1.8-2.4)  mg/dl


 


Troponin I    (0.00-0.056)  ng/mL


 


Triglycerides    (<150)  mg/dL


 


Cholesterol    (<200)  mg/dL


 


LDL Cholesterol Direct    (<100)  mg/dL


 


HDL Cholesterol    (40-59)  mg/dL











Med Orders - Current: 


 Current Medications





Albuterol/Ipratropium (Duoneb 3.0-0.5 Mg/3 Ml)  3 ml NEB QID PRN


   PRN Reason: Shortness of Breath


Bumetanide (Bumex)  0.5 mg PO DAILY [facility]


   Last Admin: 07/12/17 09:11 Dose:  0.5 mg


Dextrose/Water (Dextrose 50% In Water)  50 ml IVPUSH ASDIRECTED PRN


   PRN Reason: Hypoglycemia


Diltiazem HCl (Cardizem)  30 mg PO Q12HR [facility]


   Last Admin: 07/12/17 09:12 Dose:  30 mg


Gabapentin (Neurontin)  300 mg PO BEDTIME [facility]


   Last Admin: 07/11/17 20:41 Dose:  Not Given


Glipizide (Glucotrol Xl)  5 mg PO QAM [facility]


Hydralazine HCl (Apresoline)  20 mg IVPUSH Q6H PRN


   PRN Reason: Hypertension


   Last Admin: 07/11/17 21:27 Dose:  20 mg


Insulin Aspart (Novolog)  0 unit SUBCUT QIDACANDBED [facility]


   PRN Reason: Protocol


   Last Admin: 07/12/17 17:23 Dose:  Not Given


Metformin HCl (Glucophage)  1,000 mg PO BID [facility]


Metoprolol Tartrate (Lopressor)  50 mg PO Q12HR [facility]


Miscellaneous Information (Remove Patch)  0 ea TRDERM DAILY [facility]


Nicotine (Habitrol)  21 mg TRDERM DAILY [facility]


   Last Admin: 07/12/17 11:27 Dose:  21 mg


Pantoprazole Sodium (Protonix***)  40 mg PO DAILY [facility]


   Last Admin: 07/12/17 09:12 Dose:  40 mg


Rivaroxaban (Xarelto)  15 mg PO QAM [facility]


Rosuvastatin Calcium (Crestor)  10 mg PO BEDTIME [facility]


Spironolactone (Aldactone)  25 mg PO DAILY [facility]


Tamsulosin HCl (Flomax)  0.4 mg PO DAILY [facility]


   Last Admin: 07/12/17 17:17 Dose:  0.4 mg


Temazepam (Restoril)  7.5 mg PO BEDTIME PRN


   PRN Reason: Sleep


Terazosin HCl (Hytrin)  2 mg PO BEDTIME [facility]





Discontinued Medications





Diltiazem HCl (Diltiazem)  10 mg IVPUSH ONETIME ONE


   Stop: 07/11/17 13:32


   Last Admin: 07/11/17 13:37 Dose:  10 mg


Furosemide (Lasix)  40 mg IVPUSH NOW ONE


   Stop: 07/11/17 11:29


   Last Admin: 07/11/17 11:54 Dose:  40 mg


Furosemide (Lasix)  20 mg IVPUSH ONETIME ONE


   Stop: 07/12/17 14:29


   Last Admin: 07/12/17 15:29 Dose:  20 mg


Sodium Chloride (Normal Saline)  1,000 mls @ 100 mls/hr IV ASDIRECTED [facility]


   Last Admin: 07/11/17 10:19 Dose:  100 mls/hr


Diltiazem HCl 125 mg/ Sodium (Chloride)  125 mls @ 10 mls/hr IV TITRATE [facility]


   PRN Reason: 10 MG/HR


   Last Infusion: 07/11/17 17:55 Dose:  0 mg/hr, 0 mls/hr


Metformin HCl (Glucophage)  1,000 mg PO BID [facility]


   Last Admin: 07/12/17 09:12 Dose:  1,000 mg


Metoprolol Succinate (Toprol Xl)  100 mg PO DAILY [facility]


   Last Admin: 07/12/17 09:10 Dose:  100 mg


Ondansetron HCl (Zofran)  4 mg IVPUSH ONETIME ONE


   Stop: 07/11/17 12:30


   Last Admin: 07/11/17 12:44 Dose:  4 mg


Ondansetron HCl (Zofran)  4 mg IVPUSH ONETIME ONE


   Stop: 07/12/17 15:46


   Last Admin: 07/12/17 15:57 Dose:  4 mg


Potassium Chloride (Potassium Chloride)  40 meq PO ONETIME ONE


   Stop: 07/12/17 14:28


   Last Admin: 07/12/17 15:29 Dose:  40 meq


Rivaroxaban (Xarelto)  20 mg PO QAM [facility]


   Last Admin: 07/12/17 09:10 Dose:  20 mg


Simvastatin (Zocor)  80 mg PO BEDTIME [facility]


   Last Admin: 07/11/17 21:32 Dose:  80 mg


Temazepam (Restoril)  15 mg PO BEDTIME PRN


   PRN Reason: Sleep


   Last Admin: 07/11/17 22:41 Dose:  15 mg


Terazosin HCl (Hytrin)  2 mg PO ONETIME ONE


   Stop: 07/12/17 10:46


   Last Admin: 07/12/17 11:38 Dose:  2 mg











- Exam


Quality Assessment: supplemental oxygen, DVT prophylaxis


General: alert, oriented, cooperative, no acute distress


HEENT: Pupils equal, Pupils reactive, EOMI


Neck: supple, trachea midline, no JVD


Lungs: Normal respiratory effort, Decreased breath sounds


Cardiovascular: Regular Rate, Irregular Rhythm


Abdomen: bowel sounds present, soft, no tenderness, no distension


Back Exam: Normal Inspection


Extremities: normal pulses


Neurological: no new focal deficit


Psy/Mental Status: alert, depressed





- Problem List & Annotations


(1) Diastolic CHF


SNOMED Code(s): 923033607, 343319315


   Code(s): I50.30 - UNSPECIFIED DIASTOLIC (CONGESTIVE) HEART FAILURE   Status: 

Acute   Current Visit: Yes   





(2) Diabetes


SNOMED Code(s): 97284825


   Code(s): E11.9 - TYPE 2 DIABETES MELLITUS WITHOUT COMPLICATIONS   Status: 

Acute   Current Visit: Yes   





(3) Tobacco dependence


SNOMED Code(s): 26839364


   Code(s): F17.200 - NICOTINE DEPENDENCE, UNSPECIFIED, UNCOMPLICATED   Status: 

Acute   Current Visit: Yes   





(4) Hyperlipidemia


SNOMED Code(s): 60510447


   Code(s): E78.5 - HYPERLIPIDEMIA, UNSPECIFIED   Status: Acute   Current Visit

: Yes   





(5) COPD (chronic obstructive pulmonary disease)


SNOMED Code(s): 29826859


   Code(s): J44.9 - CHRONIC OBSTRUCTIVE PULMONARY DISEASE, UNSPECIFIED   Status

: Acute   Current Visit: Yes   





(6) GERD (gastroesophageal reflux disease)


SNOMED Code(s): 095042588


   Code(s): K21.9 - GASTRO-ESOPHAGEAL REFLUX DISEASE WITHOUT ESOPHAGITIS   

Status: Acute   Current Visit: Yes   





(7) Atrial fibrillation with RVR


SNOMED Code(s): 376694153217344


   Code(s): I48.91 - UNSPECIFIED ATRIAL FIBRILLATION   Status: Acute   Current 

Visit: Yes   





(8) CVA (cerebral vascular accident)


SNOMED Code(s): 039621574


   Code(s): I63.9 - CEREBRAL INFARCTION, UNSPECIFIED   Status: Acute   Current 

Visit: Yes   


Qualifiers: 


   Qualified Code(s): I63.9 - Cerebral infarction, unspecified   





(9) Confusion


SNOMED Code(s): 678066191


   Code(s): R41.0 - DISORIENTATION, UNSPECIFIED   Status: Acute   Current Visit

: No   





(10) Hypertension


SNOMED Code(s): 01835550


   Code(s): I10 - ESSENTIAL (PRIMARY) HYPERTENSION   Status: Acute   Current 

Visit: No   





- Problem List Review


Problem List Initiated/Reviewed/Updated: Yes





- My Orders


Last 24 Hours: 


My Active Orders





07/11/17 20:44


hydrALAZINE [Apresoline]   20 mg IVPUSH Q6H PRN 





07/11/17 21:00


Gabapentin [Neurontin]   300 mg PO BEDTIME 





07/11/17 21:19


EKG 12 Lead [EKG Documentation Completion] [RC] STAT 





07/12/17 09:00


Bumetanide [Bumex]   0.5 mg PO DAILY 


Pantoprazole [ProTONIX***]   40 mg PO DAILY 





07/12/17 09:39


Admission Status [Patient Status] [ADT] Routine 





07/12/17 10:30


Nicotine [Habitrol]   21 mg TRDERM DAILY 





07/12/17 14:24


Dextrose 50% in Water   50 ml IVPUSH ASDIRECTED PRN 





07/12/17 14:25


Temazepam [Restoril]   7.5 mg PO BEDTIME PRN 





07/12/17 14:30


Albuterol/Ipratropium [DuoNeb 3.0-0.5 MG/3 ML]   3 ml NEB QID PRN 





07/12/17 14:31


RT Aerosol Therapy [RC] ASDIRECTED 


CXR [Chest 2V] [CR] Routine 





07/12/17 17:00


Tamsulosin [Flomax]   0.4 mg PO DAILY 





07/12/17 21:00


Rosuvastatin [Crestor]   10 mg PO BEDTIME 





07/13/17 05:00


BMP [BASIC METABOLIC PANEL,BMP] [CHEM] DAILY 


CBC WITH AUTO DIFF [HEME] DAILY 


MAGNESIUM [CHEM] DAILY 





07/13/17 08:00


Rivaroxaban [Xarelto]   15 mg PO QAM 


glipiZIDE [Glucotrol XL]   5 mg PO QAM 





07/13/17 09:00


Metoprolol Tartrate [Lopressor]   50 mg PO Q12HR 


Remove Patch   0 ea TRDERM DAILY 


Spironolactone [Aldactone]   25 mg PO DAILY 





07/13/17 21:00


Terazosin [Hytrin]   2 mg PO BEDTIME 





07/14/17 05:00


BMP [BASIC METABOLIC PANEL,BMP] [CHEM] DAILY 


CBC WITH AUTO DIFF [HEME] DAILY 


MAGNESIUM [CHEM] DAILY 





07/14/17 09:00


metFORMIN [Glucophage]   1,000 mg PO BID 





07/15/17 05:00


BMP [BASIC METABOLIC PANEL,BMP] [CHEM] DAILY 


CBC WITH AUTO DIFF [HEME] DAILY 


MAGNESIUM [CHEM] DAILY 














- Plan


Plan:: 











Impression:





Symptomatic bradycardia, iatrogenic;  Metoprolol succinate was not listed in 

PCP clinic notes from 6/30/17, lasix was listed as the diuretic.  The previous 

note from 6/22/17 listed bumex and metoprolol.  The BB given was based on the 

home med listed which had not been updated.





CVA;  MRI was performed this admission as well as 7/11/17.  Several small 

lacunar infarcts were seen, felt to be fairly acute.  CF this admission: small 

lacunar infarcts believed to be fairly acute;  the results listed appear to be 

consistent with the July 11, 2017 study.





2D echo on 6/29/17 documented LVEF 60%;  grade IV diastolic dysfunction, 

restrictive





Elevated Tn, type 2 MI














Chronic


HTN


HLD


Diabetes mellitus type 2


Hx of Bladder Cancer


Hx of Colon Polyps











Plan:





Adjust BB to avoid bradycardia and hypotension


Adjust Xarelto for renal function


Query Depression


Transfer to [facility] telemetry


***DC 24-48 if stable to SNF


Continue SW/PT/OT


DVT/GI prophylaxis

## 2017-07-13 RX ADMIN — GLIPIZIDE SCH MG: 5 TABLET, FILM COATED, EXTENDED RELEASE ORAL at 08:26

## 2017-07-13 NOTE — CR
Chest: Two views of the chest were obtained.

 

Comparison: Previous chest x-ray of 07/11/17.

 

Heart is mildly enlarged.  Mild tortuosity of the thoracic aorta is 

seen.  Lungs are clear with no acute infiltrates.  Mild scoliosis and 

mild degenerative change is seen within the spine.

 

Impression:

1.  Mild cardiomegaly.  Other incidental findings.

2.  Nothing acute is appreciated on two-view chest x-ray.  

 

Diagnostic code #2

 

I agree with preliminary report issued by [facility] ([facility] report finalized 

on 07/12/17, 4:37 PM Central Time)

## 2017-07-13 NOTE — PCM.PN
- General Info


Date of Service: 07/13/17


Functional Status: Reports: pain controlled, tolerating diet, ambulating (walker

)





- Review of Systems


General: Reports: Weakness


HEENT: Reports: no symptoms


Pulmonary: Reports: no symptoms


Cardiovascular: Reports: No Symptoms


Gastrointestinal: Reports: No symptoms


Genitourinary: Reports: no symptoms


Musculoskeletal: Reports: no symptoms


Skin: Reports: no symptoms


Neurological: Reports: Gait Disturbance


Psychiatric: Reports: depression





- Patient Data


Vitals - most recent: 


 Last Vital Signs











Temp  37.1 C   07/13/17 12:11


 


Pulse  74   07/13/17 12:11


 


Resp  12   07/13/17 12:11


 


BP  111/47 L  07/13/17 12:11


 


Pulse Ox  99   07/13/17 12:11











Weight - most recent: 60.328 kg


I&O - last 24 hours: 


 Intake & Output











 07/12/17 07/13/17 07/13/17





 22:59 06:59 14:59


 


Intake Total 300 300 240


 


Output Total 50 350 


 


Balance 250 -50 240











Lab Results last 24 hrs: 


 Laboratory Results - last 24 hr











  07/12/17 07/12/17 07/13/17 Range/Units





  15:48 21:17 05:51 


 


WBC    8.76  (4.23-9.07)  K/mm3


 


RBC    5.02  (4.63-6.08)  M/mm3


 


Hgb    9.2 L  (13.7-17.5)  gm/L


 


Hct    30.5 L  (40.1-51.0)  %


 


MCV    60.8 L  (79.0-92.2)  fl


 


MCH    18.3 L  (25.7-32.2)  pg


 


MCHC    30.2 L  (32.2-35.5)  g/dl


 


RDW Std Deviation    48.3 H  (35.1-43.9)  fL


 


Plt Count    375 H  (163-337)  K/mm3


 


MPV    8.7 L  (9.4-12.3)  fl


 


Neut % (Auto)    72.4 H  (34.0-67.9)  %


 


Lymph % (Auto)    15.5 L  (21.8-53.1)  %


 


Mono % (Auto)    7.9  (5.3-12.2)  %


 


Eos % (Auto)    3.7  (0.8-7.0)  


 


Baso % (Auto)    0.5  (0.1-1.2)  %


 


Neut # (Auto)    6.35 H  (1.78-5.38)  K/mm3


 


Lymph # (Auto)    1.36  (1.32-3.57)  K/mm3


 


Mono # (Auto)    0.69  (0.30-0.82)  K/mm3


 


Eos # (Auto)    0.32  (0.04-0.54)  K/mm3


 


Baso # (Auto)    0.04  (0.01-0.08)  K/mm3


 


Manual Slide Review    Abnormal smear  


 


Sodium     (136-145)  mEq/L


 


Potassium     (3.5-5.1)  mEq/L


 


Chloride     ()  mEq/L


 


Carbon Dioxide     (21-32)  mEq/L


 


Anion Gap     (5-15)  


 


BUN     (7-18)  mg/dL


 


Creatinine     (0.7-1.3)  mg/dL


 


Est Cr Clr Drug Dosing     mL/min


 


Estimated GFR (MDRD)     (>60)  mL/min


 


BUN/Creatinine Ratio     (14-18)  


 


Glucose     ()  mg/dL


 


POC Glucose  118 H  156 H   ()  mg/dL


 


Calcium     (8.5-10.1)  mg/dL


 


Magnesium     (1.8-2.4)  mg/dl














  07/13/17 07/13/17 07/13/17 Range/Units





  05:51 06:19 10:54 


 


WBC     (4.23-9.07)  K/mm3


 


RBC     (4.63-6.08)  M/mm3


 


Hgb     (13.7-17.5)  gm/L


 


Hct     (40.1-51.0)  %


 


MCV     (79.0-92.2)  fl


 


MCH     (25.7-32.2)  pg


 


MCHC     (32.2-35.5)  g/dl


 


RDW Std Deviation     (35.1-43.9)  fL


 


Plt Count     (163-337)  K/mm3


 


MPV     (9.4-12.3)  fl


 


Neut % (Auto)     (34.0-67.9)  %


 


Lymph % (Auto)     (21.8-53.1)  %


 


Mono % (Auto)     (5.3-12.2)  %


 


Eos % (Auto)     (0.8-7.0)  


 


Baso % (Auto)     (0.1-1.2)  %


 


Neut # (Auto)     (1.78-5.38)  K/mm3


 


Lymph # (Auto)     (1.32-3.57)  K/mm3


 


Mono # (Auto)     (0.30-0.82)  K/mm3


 


Eos # (Auto)     (0.04-0.54)  K/mm3


 


Baso # (Auto)     (0.01-0.08)  K/mm3


 


Manual Slide Review     


 


Sodium  139    (136-145)  mEq/L


 


Potassium  4.2    (3.5-5.1)  mEq/L


 


Chloride  104    ()  mEq/L


 


Carbon Dioxide  26    (21-32)  mEq/L


 


Anion Gap  13.2    (5-15)  


 


BUN  29 H    (7-18)  mg/dL


 


Creatinine  1.5 H    (0.7-1.3)  mg/dL


 


Est Cr Clr Drug Dosing  34.63    mL/min


 


Estimated GFR (MDRD)  45    (>60)  mL/min


 


BUN/Creatinine Ratio  19.3 H    (14-18)  


 


Glucose  158 H    ()  mg/dL


 


POC Glucose   148 H  208 H  ()  mg/dL


 


Calcium  9.2    (8.5-10.1)  mg/dL


 


Magnesium  1.9    (1.8-2.4)  mg/dl











Med Orders - Current: 


 Current Medications





Albuterol/Ipratropium (Duoneb 3.0-0.5 Mg/3 Ml)  3 ml NEB QID PRN


   PRN Reason: Shortness of Breath


Bumetanide (Bumex)  0.5 mg PO DAILY [facility]


   Last Admin: 07/13/17 08:27 Dose:  0.5 mg


Dextrose/Water (Dextrose 50% In Water)  50 ml IVPUSH ASDIRECTED PRN


   PRN Reason: Hypoglycemia


Gabapentin (Neurontin)  300 mg PO BEDTIME [facility]


   Last Admin: 07/12/17 21:20 Dose:  300 mg


Glipizide (Glucotrol Xl)  5 mg PO QAM [facility]


   Last Admin: 07/13/17 08:26 Dose:  5 mg


Hydralazine HCl (Apresoline)  20 mg IVPUSH Q6H PRN


   PRN Reason: Hypertension


   Last Admin: 07/11/17 21:27 Dose:  20 mg


Insulin Aspart (Novolog)  0 unit SUBCUT QIDACANDBED [facility]


   PRN Reason: Protocol


   Last Admin: 07/13/17 12:03 Dose:  2 unit


Metformin HCl (Glucophage)  1,000 mg PO BID [facility]


Metoprolol Tartrate (Lopressor)  50 mg PO Q12HR [facility]


   Last Admin: 07/13/17 08:30 Dose:  Not Given


Metoprolol Tartrate (Lopressor)  5 mg IVPUSH Q6H PRN


   PRN Reason: heart rate> 120


Miscellaneous Information (Remove Patch)  0 ea TRDERM DAILY [facility]


   Last Admin: 07/13/17 08:35 Dose:  1 ea


Nicotine (Habitrol)  21 mg TRDERM DAILY [facility]


   Last Admin: 07/13/17 08:31 Dose:  Not Given


Pantoprazole Sodium (Protonix***)  40 mg PO DAILY [facility]


   Last Admin: 07/13/17 08:27 Dose:  40 mg


Rivaroxaban (Xarelto)  15 mg PO QAM [facility]


   Last Admin: 07/13/17 08:27 Dose:  15 mg


Rosuvastatin Calcium (Crestor)  10 mg PO BEDTIME [facility]


   Last Admin: 07/12/17 21:20 Dose:  10 mg


Sertraline HCl (Zoloft)  25 mg PO BEDTIME [facility]


Spironolactone (Aldactone)  25 mg PO DAILY [facility]


   Last Admin: 07/13/17 08:25 Dose:  25 mg


Tamsulosin HCl (Flomax)  0.4 mg PO DAILY [facility]


   Last Admin: 07/13/17 08:26 Dose:  0.4 mg


Temazepam (Restoril)  7.5 mg PO BEDTIME PRN


   PRN Reason: Sleep


Terazosin HCl (Hytrin)  2 mg PO BEDTIME [facility]





Discontinued Medications





Diltiazem HCl (Diltiazem)  10 mg IVPUSH ONETIME ONE


   Stop: 07/11/17 13:32


   Last Admin: 07/11/17 13:37 Dose:  10 mg


Diltiazem HCl (Cardizem)  30 mg PO Q12HR [facility]


   Last Admin: 07/13/17 08:30 Dose:  Not Given


Furosemide (Lasix)  40 mg IVPUSH NOW ONE


   Stop: 07/11/17 11:29


   Last Admin: 07/11/17 11:54 Dose:  40 mg


Furosemide (Lasix)  20 mg IVPUSH ONETIME ONE


   Stop: 07/12/17 14:29


   Last Admin: 07/12/17 15:29 Dose:  20 mg


Sodium Chloride (Normal Saline)  1,000 mls @ 100 mls/hr IV ASDIRECTED [facility]


   Last Admin: 07/11/17 10:19 Dose:  100 mls/hr


Diltiazem HCl 125 mg/ Sodium (Chloride)  125 mls @ 10 mls/hr IV TITRATE [facility]


   PRN Reason: 10 MG/HR


   Last Infusion: 07/11/17 17:55 Dose:  0 mg/hr, 0 mls/hr


Metformin HCl (Glucophage)  1,000 mg PO BID [facility]


   Last Admin: 07/12/17 09:12 Dose:  1,000 mg


Metoprolol Succinate (Toprol Xl)  100 mg PO DAILY [facility]


   Last Admin: 07/12/17 09:10 Dose:  100 mg


Ondansetron HCl (Zofran)  4 mg IVPUSH ONETIME ONE


   Stop: 07/11/17 12:30


   Last Admin: 07/11/17 12:44 Dose:  4 mg


Ondansetron HCl (Zofran)  4 mg IVPUSH ONETIME ONE


   Stop: 07/12/17 15:46


   Last Admin: 07/12/17 15:57 Dose:  4 mg


Potassium Chloride (Potassium Chloride)  40 meq PO ONETIME ONE


   Stop: 07/12/17 14:28


   Last Admin: 07/12/17 15:29 Dose:  40 meq


Rivaroxaban (Xarelto)  20 mg PO QAM [facility]


   Last Admin: 07/12/17 09:10 Dose:  20 mg


Simvastatin (Zocor)  80 mg PO BEDTIME [facility]


   Last Admin: 07/11/17 21:32 Dose:  80 mg


Temazepam (Restoril)  15 mg PO BEDTIME PRN


   PRN Reason: Sleep


   Last Admin: 07/11/17 22:41 Dose:  15 mg


Terazosin HCl (Hytrin)  2 mg PO ONETIME ONE


   Stop: 07/12/17 10:46


   Last Admin: 07/12/17 11:38 Dose:  2 mg











- Exam


Quality Assessment: DVT prophylaxis


General: alert, oriented, cooperative, no acute distress


HEENT: Pupils equal, Pupils reactive, EOMI


Neck: supple, trachea midline, no JVD


Lungs: Decreased breath sounds


Cardiovascular: Regular Rate, Irregular Rhythm


Abdomen: bowel sounds present, soft, no tenderness, no distension


 (Male) Exam: Deferred


Back Exam: Normal Inspection


Extremities: normal pulses


Skin: warm


Neurological: no new focal deficit


Psy/Mental Status: alert, depressed





- Problem List & Annotations


(1) Diastolic CHF


SNOMED Code(s): 793943851, 351990312


   Code(s): I50.30 - UNSPECIFIED DIASTOLIC (CONGESTIVE) HEART FAILURE   Status: 

Acute   Current Visit: Yes   





(2) Diabetes


SNOMED Code(s): 62216164


   Code(s): E11.9 - TYPE 2 DIABETES MELLITUS WITHOUT COMPLICATIONS   Status: 

Acute   Current Visit: Yes   





(3) Tobacco dependence


SNOMED Code(s): 86450706


   Code(s): F17.200 - NICOTINE DEPENDENCE, UNSPECIFIED, UNCOMPLICATED   Status: 

Acute   Current Visit: Yes   





(4) Hyperlipidemia


SNOMED Code(s): 44010333


   Code(s): E78.5 - HYPERLIPIDEMIA, UNSPECIFIED   Status: Acute   Current Visit

: Yes   





(5) COPD (chronic obstructive pulmonary disease)


SNOMED Code(s): 65733858


   Code(s): J44.9 - CHRONIC OBSTRUCTIVE PULMONARY DISEASE, UNSPECIFIED   Status

: Acute   Current Visit: Yes   





(6) GERD (gastroesophageal reflux disease)


SNOMED Code(s): 093128674


   Code(s): K21.9 - GASTRO-ESOPHAGEAL REFLUX DISEASE WITHOUT ESOPHAGITIS   

Status: Acute   Current Visit: Yes   





(7) Atrial fibrillation with RVR


SNOMED Code(s): 679360850681625


   Code(s): I48.91 - UNSPECIFIED ATRIAL FIBRILLATION   Status: Acute   Current 

Visit: Yes   





(8) CVA (cerebral vascular accident)


SNOMED Code(s): 745159830


   Code(s): I63.9 - CEREBRAL INFARCTION, UNSPECIFIED   Status: Acute   Current 

Visit: Yes   


Qualifiers: 


   CVA mechanism: unspecified   Qualified Code(s): I63.9 - Cerebral infarction, 

unspecified   





(9) Confusion


SNOMED Code(s): 329277487


   Code(s): R41.0 - DISORIENTATION, UNSPECIFIED   Status: Acute   Current Visit

: No   





(10) Hypertension


SNOMED Code(s): 66146900


   Code(s): I10 - ESSENTIAL (PRIMARY) HYPERTENSION   Status: Acute   Current 

Visit: No   





- Problem List Review


Problem List Initiated/Reviewed/Updated: Yes





- My Orders


Last 24 Hours: 


My Active Orders





07/12/17 14:24


Dextrose 50% in Water   50 ml IVPUSH ASDIRECTED PRN 





07/12/17 14:25


Temazepam [Restoril]   7.5 mg PO BEDTIME PRN 





07/12/17 14:30


Albuterol/Ipratropium [DuoNeb 3.0-0.5 MG/3 ML]   3 ml NEB QID PRN 





07/12/17 14:31


RT Aerosol Therapy [RC] ASDIRECTED 





07/12/17 17:00


Tamsulosin [Flomax]   0.4 mg PO DAILY 





07/12/17 20:09


Metoprolol Tartrate [Lopressor]   5 mg IVPUSH Q6H PRN 





07/12/17 20:17


Resuscitation Status Routine 





07/12/17 21:00


Rosuvastatin [Crestor]   10 mg PO BEDTIME 





07/13/17 06:47


Consult to Speech Language Pathology [SLP Evaluation and Treatment] [CONS] 

Routine 





07/13/17 08:00


Rivaroxaban [Xarelto]   15 mg PO QAM 


glipiZIDE [Glucotrol XL]   5 mg PO QAM 





07/13/17 09:00


Metoprolol Tartrate [Lopressor]   50 mg PO Q12HR 


Remove Patch   0 ea TRDERM DAILY 


Spironolactone [Aldactone]   25 mg PO DAILY 





07/13/17 21:00


Sertraline [Zoloft]   25 mg PO BEDTIME 


Terazosin [Hytrin]   2 mg PO BEDTIME 





07/14/17 05:00


BMP [BASIC METABOLIC PANEL,BMP] [CHEM] DAILY 


CBC WITH AUTO DIFF [HEME] DAILY 


MAGNESIUM [CHEM] DAILY 





07/14/17 09:00


metFORMIN [Glucophage]   1,000 mg PO BID 





07/15/17 05:00


BMP [BASIC METABOLIC PANEL,BMP] [CHEM] DAILY 


CBC WITH AUTO DIFF [HEME] DAILY 


MAGNESIUM [CHEM] DAILY 














- Plan


Plan:: 











Impression:





CVA with residual right sided weakness, improving





MRI 7/11/17 is consistent with previous study on 6/29/17





A Fib with RVR, restarted medication





2D echo with preserved EF; diastolic dysfunction, Grade 4





Type 2, AMI





Tobacco stopped on the day of admission





Diabetes Mellitus





Depression














Plan:





Low dose ASA


Renal dose Xarelto


BB, short acting selective BB


Inpatient rehab for CVa with SNF DC on 7/14/17.

## 2017-07-14 VITALS — DIASTOLIC BLOOD PRESSURE: 68 MMHG | SYSTOLIC BLOOD PRESSURE: 106 MMHG

## 2017-07-14 RX ADMIN — GLIPIZIDE SCH MG: 5 TABLET, FILM COATED, EXTENDED RELEASE ORAL at 08:23

## 2017-07-14 NOTE — [id number]
<Nelli Rose M - Last Filed: 07/14/17 10:56>





**Discharge Summary





- Hospital Course


Free Text/Narrative:: 





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 

[place].  He appeared to have additional right sided weakness, more pronounced 

from his usual baseline.  He apparently has had frequent falls.  A CT of the 

head showed no acute process; an MRI has been ordered the day of admission.  He 

is hard of hearing and does not have his hearing aids in place.  The patient 

had A Fib with RVR, was given a bolus of Cardizem with a reasonable heart rate 

response, a drip was started but has been subsequently stopped.  He is known to 

have chronic A Fib.  At one time, he was not on an anticoagulant, he however is 

currently on Xarelto.  Additional office notes will be obtained to determine 

when, where and the extent of the previous stroke





Hospitalist is consulted for admission for CVA/TIA workup





MRI of brain returned with fairly acute several small lacunar infarcts to left 

side with in corona radiata region. Repeat CXR was obtained and unremarkable. 

Patient did have bradycardia with cardizem which was stopped, metoprolol dose 

decreased. B/P was maintained and WNL. He worked with PT/OT wtih improving 

right sided weakness with recommendations for SNF rehab stay. Patient and 

family are in agreement to this. He was previously on Xarelto 20mg daily, based 

on renal function and age parameters per pharmacy this will be decreased to 

15mg daily. He is anemic at baseline with slight worsening during his stay. He 

will be started on iron supplementation daily with recheck of CBC within one 

week of discharge with results to be sent to PCP for review. He will have 

follow up with PCP within one week of discharge as well. 





- Discharge Data


Discharge Date: 07/14/17 (admit date 7/11/17)


Discharge Disposition: DC/Tfer to SNF 03


Condition: Good





- Patient Summary/Data


Operative Procedure(s) Performed: None


Complications: None


Consults: 


 Consultations





07/11/17 16:27


Consult to  [CONS] Routine 





07/11/17 16:28


Consult to Occupational Therapy [OT Evaluation and Treatment] [CONS] Routine 


Consult to Physical Therapy [PT Evaluation and Treatment] [CONS] Routine 





07/13/17 06:47


Consult to Speech Language Pathology [SLP Evaluation and Treatment] [CONS] 

Routine 











Labs Pending at D/C: 





None


Recommended Follow-up Testing/Procedures: 





Follow up with PCP, Dr. Herman within one week of discharge with labs at that time

, CBC and BMP


Planned Operative Procedure(s) after DC: None


Hospital Course: 





As above





- Patient Instructions


Diet: Heart Healthy Diet, Diabetic Diet


Activity: As Tolerated (PT/OT to continue to evaluate and treat)


Driving: Do Not Drive


Showering/Bathing: May Shower


Notify Provider of: Fever, Increased Pain, Nausea and/or Vomiting





- Discharge Plan


Prescriptions/Med Rec: 


Metoprolol Tartrate [Lopressor] 50 mg PO Q12HR #60 tablet


Aspirin [Halfprin] 81 mg PO DAILY #30 tab.ec


Ferrous Sulfate 325 mg PO DAILY #30 tablet


Gabapentin [Neurontin] 300 mg PO BEDTIME #30 cap


Nicotine [Habitrol] 21 mg TRDERM DAILY #30 patch


Rivaroxaban [Xarelto] 15 mg PO QAM #30 tablet


Sertraline [Zoloft] 25 mg PO BEDTIME #30 tablet


Spironolactone [Aldactone] 25 mg PO DAILY #30 tablet


Tamsulosin [Flomax] 0.4 mg PO DAILY #30 cap.er


Terazosin [Hytrin] 2 mg PO BEDTIME #30 cap


Home Medications: 


 Home Meds





metFORMIN [Glucophage] 1,000 mg PO BID 08/20/15 [History]


Bumetanide [Bumex] 0.5 mg PO DAILY 07/11/17 [History]


atorvaSTATin Calcium [Atorvastatin Calcium] 40 mg PO BEDTIME 07/11/17 [History]


glyBURIDE [Glyburide] 5 mg PO QAM 07/11/17 [History]


Aspirin [Halfprin] 81 mg PO DAILY #30 tab.ec 07/14/17 [Rx]


Ferrous Sulfate 325 mg PO DAILY #30 tablet 07/14/17 [Rx]


Gabapentin [Neurontin] 300 mg PO BEDTIME #30 cap 07/14/17 [Rx]


Metoprolol Tartrate [Lopressor] 50 mg PO Q12HR #60 tablet 07/14/17 [Rx]


Nicotine [Habitrol] 21 mg TRDERM DAILY #30 patch 07/14/17 [Rx]


Rivaroxaban [Xarelto] 15 mg PO QAM #30 tablet 07/14/17 [Rx]


Sertraline [Zoloft] 25 mg PO BEDTIME #30 tablet 07/14/17 [Rx]


Spironolactone [Aldactone] 25 mg PO DAILY #30 tablet 07/14/17 [Rx]


Tamsulosin [Flomax] 0.4 mg PO DAILY #30 cap.er 07/14/17 [Rx]


Terazosin [Hytrin] 2 mg PO BEDTIME #30 cap 07/14/17 [Rx]








Patient Handouts:  Smoking Cessation, Tips for Success, Easy-to-Read, 

Rivaroxaban oral tablets, Ischemic Stroke Treated Without Warfarin, Easy-to-Read

, Heart Failure, Easy-to-Read, Atrial Fibrillation, Easy-to-Read


Forms:  ED Department Discharge


Referrals: 


David Herman MD [Primary Care Provider] - 07/21/17 10:45 am (Hospital follow-

up appointment.)





- Discharge Summary/Plan Comment


DC Time >30 min.: Yes (40 min)





- General Info


Date of Service: 07/14/17


Admission Dx/Problem (Free Text: 


 Admission Diagnosis/Problem





Admission Diagnosis/Problem      CVA, Cerebrovascular accident








Functional Status: Reports: Pain Controlled, Tolerating Diet, Ambulating, 

Urinating.  Denies: New Symptoms





- Review of Systems


General: Reports: Weakness (improved)


HEENT: Reports: No Symptoms


Pulmonary: Reports: No Symptoms


Cardiovascular: Reports: No Symptoms


Gastrointestinal: Reports: No Symptoms


Genitourinary: Reports: No Symptoms


Musculoskeletal: Reports: No Symptoms


Skin: Reports: No Symptoms


Neurological: Reports: Weakness (improved)


Psychiatric: Reports: No Symptoms





- Patient Data


Vitals - Most Recent: 


 Last Vital Signs











Temp  97.5 F   07/14/17 04:29


 


Pulse  68   07/14/17 04:29


 


Resp  17   07/14/17 04:29


 


BP  120/94 H  07/14/17 04:29


 


Pulse Ox  99   07/14/17 04:29











Weight - Most Recent: 61.552 kg


I&O - Last 24 hours: 


 Intake & Output











 07/13/17 07/14/17 07/14/17





 22:59 06:59 14:59


 


Intake Total 700 400 


 


Output Total 200 300 


 


Balance 500 100 











Lab Results - Last 24 hrs: 


 Laboratory Results - last 24 hr











  07/13/17 07/13/17 07/13/17 Range/Units





  10:54 16:54 20:46 


 


WBC     (4.23-9.07)  K/mm3


 


RBC     (4.63-6.08)  M/mm3


 


Hgb     (13.7-17.5)  gm/L


 


Hct     (40.1-51.0)  %


 


MCV     (79.0-92.2)  fl


 


MCH     (25.7-32.2)  pg


 


MCHC     (32.2-35.5)  g/dl


 


RDW Std Deviation     (35.1-43.9)  fL


 


Plt Count     (163-337)  K/mm3


 


MPV     (9.4-12.3)  fl


 


Neut % (Auto)     (34.0-67.9)  %


 


Lymph % (Auto)     (21.8-53.1)  %


 


Mono % (Auto)     (5.3-12.2)  %


 


Eos % (Auto)     (0.8-7.0)  


 


Baso % (Auto)     (0.1-1.2)  %


 


Neut # (Auto)     (1.78-5.38)  K/mm3


 


Lymph # (Auto)     (1.32-3.57)  K/mm3


 


Mono # (Auto)     (0.30-0.82)  K/mm3


 


Eos # (Auto)     (0.04-0.54)  K/mm3


 


Baso # (Auto)     (0.01-0.08)  K/mm3


 


Manual Slide Review     


 


Sodium     (136-145)  mEq/L


 


Potassium     (3.5-5.1)  mEq/L


 


Chloride     ()  mEq/L


 


Carbon Dioxide     (21-32)  mEq/L


 


Anion Gap     (5-15)  


 


BUN     (7-18)  mg/dL


 


Creatinine     (0.7-1.3)  mg/dL


 


Est Cr Clr Drug Dosing     mL/min


 


Estimated GFR (MDRD)     (>60)  mL/min


 


BUN/Creatinine Ratio     (14-18)  


 


Glucose     ()  mg/dL


 


POC Glucose  208 H  156 H  201 H  ()  mg/dL


 


Calcium     (8.5-10.1)  mg/dL


 


Magnesium     (1.8-2.4)  mg/dl














  07/14/17 07/14/17 07/14/17 Range/Units





  05:29 05:29 06:24 


 


WBC  9.52 H    (4.23-9.07)  K/mm3


 


RBC  4.96    (4.63-6.08)  M/mm3


 


Hgb  8.8 L    (13.7-17.5)  gm/L


 


Hct  30.4 L    (40.1-51.0)  %


 


MCV  61.3 L    (79.0-92.2)  fl


 


MCH  17.7 L    (25.7-32.2)  pg


 


MCHC  28.9 L    (32.2-35.5)  g/dl


 


RDW Std Deviation  48.0 H    (35.1-43.9)  fL


 


Plt Count  380 H    (163-337)  K/mm3


 


MPV  8.5 L    (9.4-12.3)  fl


 


Neut % (Auto)  71.4 H    (34.0-67.9)  %


 


Lymph % (Auto)  17.2 L    (21.8-53.1)  %


 


Mono % (Auto)  6.6    (5.3-12.2)  %


 


Eos % (Auto)  4.1    (0.8-7.0)  


 


Baso % (Auto)  0.6    (0.1-1.2)  %


 


Neut # (Auto)  6.79 H    (1.78-5.38)  K/mm3


 


Lymph # (Auto)  1.64    (1.32-3.57)  K/mm3


 


Mono # (Auto)  0.63    (0.30-0.82)  K/mm3


 


Eos # (Auto)  0.39    (0.04-0.54)  K/mm3


 


Baso # (Auto)  0.06    (0.01-0.08)  K/mm3


 


Manual Slide Review  Abnormal smear    


 


Sodium   140   (136-145)  mEq/L


 


Potassium   4.3   (3.5-5.1)  mEq/L


 


Chloride   105   ()  mEq/L


 


Carbon Dioxide   28   (21-32)  mEq/L


 


Anion Gap   11.3   (5-15)  


 


BUN   35 H   (7-18)  mg/dL


 


Creatinine   1.3   (0.7-1.3)  mg/dL


 


Est Cr Clr Drug Dosing   40.77   mL/min


 


Estimated GFR (MDRD)   53   (>60)  mL/min


 


BUN/Creatinine Ratio   26.9 H   (14-18)  


 


Glucose   154 H   ()  mg/dL


 


POC Glucose    155 H  ()  mg/dL


 


Calcium   9.1   (8.5-10.1)  mg/dL


 


Magnesium   2.0   (1.8-2.4)  mg/dl











Med Orders - Current: 


 Current Medications





Albuterol/Ipratropium (Duoneb 3.0-0.5 Mg/3 Ml)  3 ml NEB QID PRN


   PRN Reason: Shortness of Breath


Aspirin (Halfprin)  81 mg PO DAILY [facility]


Bumetanide (Bumex)  0.5 mg PO DAILY [facility]


   Last Admin: 07/13/17 08:27 Dose:  0.5 mg


Dextrose/Water (Dextrose 50% In Water)  50 ml IVPUSH ASDIRECTED PRN


   PRN Reason: Hypoglycemia


Gabapentin (Neurontin)  300 mg PO BEDTIME [facility]


   Last Admin: 07/13/17 21:19 Dose:  300 mg


Glipizide (Glucotrol Xl)  5 mg PO QAM [facility]


   Last Admin: 07/13/17 08:26 Dose:  5 mg


Hydralazine HCl (Apresoline)  20 mg IVPUSH Q6H PRN


   PRN Reason: Hypertension


   Last Admin: 07/11/17 21:27 Dose:  20 mg


Insulin Aspart (Novolog)  0 unit SUBCUT QIDACANDBED [facility]


   PRN Reason: Protocol


   Last Admin: 07/13/17 21:19 Dose:  2 unit


Metformin HCl (Glucophage)  1,000 mg PO BID [facility]


Metoprolol Tartrate (Lopressor)  50 mg PO Q12HR [facility]


   Last Admin: 07/13/17 20:42 Dose:  Not Given


Metoprolol Tartrate (Lopressor)  5 mg IVPUSH Q6H PRN


   PRN Reason: heart rate> 120


Miscellaneous Information (Remove Patch)  0 ea TRDERM DAILY [facility]


   Last Admin: 07/13/17 08:35 Dose:  1 ea


Nicotine (Habitrol)  21 mg TRDERM DAILY [facility]


   Last Admin: 07/13/17 08:31 Dose:  Not Given


Pantoprazole Sodium (Protonix***)  40 mg PO DAILY [facility]


   Last Admin: 07/13/17 08:27 Dose:  40 mg


Rivaroxaban (Xarelto)  15 mg PO QAM [facility]


   Last Admin: 07/13/17 08:27 Dose:  15 mg


Rosuvastatin Calcium (Crestor)  10 mg PO BEDTIME [facility]


   Last Admin: 07/13/17 21:19 Dose:  10 mg


Sertraline HCl (Zoloft)  25 mg PO BEDTIME [facility]


   Last Admin: 07/13/17 21:19 Dose:  25 mg


Spironolactone (Aldactone)  25 mg PO DAILY [facility]


   Last Admin: 07/13/17 08:25 Dose:  25 mg


Tamsulosin HCl (Flomax)  0.4 mg PO DAILY [facility]


   Last Admin: 07/13/17 08:26 Dose:  0.4 mg


Temazepam (Restoril)  7.5 mg PO BEDTIME PRN


   PRN Reason: Sleep


Terazosin HCl (Hytrin)  2 mg PO BEDTIME [facility]


   Last Admin: 07/13/17 20:42 Dose:  Not Given





Discontinued Medications





Diltiazem HCl (Diltiazem)  10 mg IVPUSH ONETIME ONE


   Stop: 07/11/17 13:32


   Last Admin: 07/11/17 13:37 Dose:  10 mg


Diltiazem HCl (Cardizem)  30 mg PO Q12HR [facility]


   Last Admin: 07/13/17 08:30 Dose:  Not Given


Furosemide (Lasix)  40 mg IVPUSH NOW ONE


   Stop: 07/11/17 11:29


   Last Admin: 07/11/17 11:54 Dose:  40 mg


Furosemide (Lasix)  20 mg IVPUSH ONETIME ONE


   Stop: 07/12/17 14:29


   Last Admin: 07/12/17 15:29 Dose:  20 mg


Sodium Chloride (Normal Saline)  1,000 mls @ 100 mls/hr IV ASDIRECTED [facility]


   Last Admin: 07/11/17 10:19 Dose:  100 mls/hr


Diltiazem HCl 125 mg/ Sodium (Chloride)  125 mls @ 10 mls/hr IV TITRATE [facility]


   PRN Reason: 10 MG/HR


   Last Infusion: 07/11/17 17:55 Dose:  0 mg/hr, 0 mls/hr


Magnesium Hydroxide (Milk Of Magnesia)  30 ml PO ONETIME ONE


   Stop: 07/13/17 17:01


   Last Admin: 07/13/17 17:20 Dose:  30 ml


Metformin HCl (Glucophage)  1,000 mg PO BID [facility]


   Last Admin: 07/12/17 09:12 Dose:  1,000 mg


Metoprolol Succinate (Toprol Xl)  100 mg PO DAILY [facility]


   Last Admin: 07/12/17 09:10 Dose:  100 mg


Ondansetron HCl (Zofran)  4 mg IVPUSH ONETIME ONE


   Stop: 07/11/17 12:30


   Last Admin: 07/11/17 12:44 Dose:  4 mg


Ondansetron HCl (Zofran)  4 mg IVPUSH ONETIME ONE


   Stop: 07/12/17 15:46


   Last Admin: 07/12/17 15:57 Dose:  4 mg


Potassium Chloride (Potassium Chloride)  40 meq PO ONETIME ONE


   Stop: 07/12/17 14:28


   Last Admin: 07/12/17 15:29 Dose:  40 meq


Rivaroxaban (Xarelto)  20 mg PO QAM [facility]


   Last Admin: 07/12/17 09:10 Dose:  20 mg


Simvastatin (Zocor)  80 mg PO BEDTIME [facility]


   Last Admin: 07/11/17 21:32 Dose:  80 mg


Temazepam (Restoril)  15 mg PO BEDTIME PRN


   PRN Reason: Sleep


   Last Admin: 07/11/17 22:41 Dose:  15 mg


Terazosin HCl (Hytrin)  2 mg PO ONETIME ONE


   Stop: 07/12/17 10:46


   Last Admin: 07/12/17 11:38 Dose:  2 mg











- Exam


Quality Assessment: Reports: DVT prophylaxis


General: Reports: alert, cooperative, no acute distress


HEENT: Reports: Pupils equal, Pupils reactive, EOMI, Mucous membr. moist/pink


Neck: Reports: supple


Lungs: Reports: Clear to Auscultation, Normal Respiratory Effort


Cardiovascular: Reports: Irregular Rhythm


Abdomen: Reports: bowel sounds present


 (Male) Exam: Deferred


Rectal (Males) Exam: Deferred


Extremities: Reports: no edema


Neurological: Reports: no new focal deficit


Psy/Mental Status: Reports: alert, normal affect, normal mood





*Q Meaningful Use (DIS)





- VTE *Q


VTE Criteria *Q: 








- Stroke *Q


Stroke Criteria *Q: 








- AMI *Q


AMI Criteria *Q: 








<Kimberly Azar - Last Filed: 07/23/17 17:33>





**Discharge Summary





- Hospital Course


Free Text/Narrative:: 





Substantial history of CVAs includes current admission, see DC summary.





- Discharge Diagnosis/Problem(s)


(1) Diastolic CHF


SNOMED Code(s): 241581925, 443738466


   ICD Code: I50.30 - UNSPECIFIED DIASTOLIC (CONGESTIVE) HEART FAILURE   Status

: Acute   





(2) Diabetes


SNOMED Code(s): 54560955


   ICD Code: E11.9 - TYPE 2 DIABETES MELLITUS WITHOUT COMPLICATIONS   Status: 

Acute   





(3) Tobacco dependence


SNOMED Code(s): 17344867


   ICD Code: F17.200 - NICOTINE DEPENDENCE, UNSPECIFIED, UNCOMPLICATED   Status

: Acute   





(4) Hyperlipidemia


SNOMED Code(s): 97878004


   ICD Code: E78.5 - HYPERLIPIDEMIA, UNSPECIFIED   Status: Acute   





(5) COPD (chronic obstructive pulmonary disease)


SNOMED Code(s): 38546135


   ICD Code: J44.9 - CHRONIC OBSTRUCTIVE PULMONARY DISEASE, UNSPECIFIED   Status

: Acute   





(6) GERD (gastroesophageal reflux disease)


SNOMED Code(s): 521771148


   ICD Code: K21.9 - GASTRO-ESOPHAGEAL REFLUX DISEASE WITHOUT ESOPHAGITIS   

Status: Acute   





(7) Atrial fibrillation with RVR


SNOMED Code(s): 161184016416406


   ICD Code: I48.91 - UNSPECIFIED ATRIAL FIBRILLATION   Status: Acute   





(8) CVA (cerebral vascular accident)


SNOMED Code(s): 857765211


   ICD Code: I63.9 - CEREBRAL INFARCTION, UNSPECIFIED   Status: Acute   


Qualifiers: 


   CVA mechanism: unspecified   Qualified Code(s): I63.9 - Cerebral infarction, 

unspecified   





(9) Confusion


SNOMED Code(s): 976414020


   ICD Code: R41.0 - DISORIENTATION, UNSPECIFIED   Status: Acute   





(10) Hypertension


SNOMED Code(s): 15600816


   ICD Code: I10 - ESSENTIAL (PRIMARY) HYPERTENSION   Status: Acute   





- Patient Summary/Data


Consults: 


 Consultations





07/11/17 16:27


Consult to  [CONS] Routine 





07/11/17 16:28


Consult to Occupational Therapy [OT Evaluation and Treatment] [CONS] Routine 


Consult to Physical Therapy [PT Evaluation and Treatment] [CONS] Routine 





07/13/17 06:47


Consult to Speech Language Pathology [SLP Evaluation and Treatment] [CONS] 

Routine 














- Patient Data


Vitals - Most Recent: 


 Last Vital Signs











Temp  36.5 C   07/14/17 11:37


 


Pulse  91   07/14/17 11:38


 


Resp  20   07/14/17 11:37


 


BP  106/68   07/14/17 11:38


 


Pulse Ox  98   07/14/17 11:38











Med Orders - Current: 


 Current Medications








Discontinued Medications





Albuterol/Ipratropium (Duoneb 3.0-0.5 Mg/3 Ml)  3 ml NEB QID PRN


   PRN Reason: Shortness of Breath


Aspirin (Halfprin)  81 mg PO DAILY [facility]


   Last Admin: 07/14/17 08:20 Dose:  81 mg


Bumetanide (Bumex)  0.5 mg PO DAILY [facility]


   Last Admin: 07/14/17 08:20 Dose:  0.5 mg


Dextrose/Water (Dextrose 50% In Water)  50 ml IVPUSH ASDIRECTED PRN


   PRN Reason: Hypoglycemia


Diltiazem HCl (Diltiazem)  10 mg IVPUSH ONETIME ONE


   Stop: 07/11/17 13:32


   Last Admin: 07/11/17 13:37 Dose:  10 mg


Diltiazem HCl (Cardizem)  30 mg PO Q12HR [facility]


   Last Admin: 07/13/17 08:30 Dose:  Not Given


Furosemide (Lasix)  40 mg IVPUSH NOW ONE


   Stop: 07/11/17 11:29


   Last Admin: 07/11/17 11:54 Dose:  40 mg


Furosemide (Lasix)  20 mg IVPUSH ONETIME ONE


   Stop: 07/12/17 14:29


   Last Admin: 07/12/17 15:29 Dose:  20 mg


Gabapentin (Neurontin)  300 mg PO BEDTIME [facility]


   Last Admin: 07/13/17 21:19 Dose:  300 mg


Glipizide (Glucotrol Xl)  5 mg PO QAM [facility]


   Last Admin: 07/14/17 08:23 Dose:  5 mg


Hydralazine HCl (Apresoline)  20 mg IVPUSH Q6H PRN


   PRN Reason: Hypertension


   Last Admin: 07/11/17 21:27 Dose:  20 mg


Sodium Chloride (Normal Saline)  1,000 mls @ 100 mls/hr IV ASDIRECTED [facility]


   Last Admin: 07/11/17 10:19 Dose:  100 mls/hr


Diltiazem HCl 125 mg/ Sodium (Chloride)  125 mls @ 10 mls/hr IV TITRATE [facility]


   PRN Reason: 10 MG/HR


   Last Infusion: 07/11/17 17:55 Dose:  0 mg/hr, 0 mls/hr


Insulin Aspart (Novolog)  0 unit SUBCUT QIDACANDBED [facility]


   PRN Reason: Protocol


   Last Admin: 07/14/17 12:28 Dose:  Not Given


Magnesium Hydroxide (Milk Of Magnesia)  30 ml PO ONETIME ONE


   Stop: 07/13/17 17:01


   Last Admin: 07/13/17 17:20 Dose:  30 ml


Metformin HCl (Glucophage)  1,000 mg PO BID [facility]


   Last Admin: 07/12/17 09:12 Dose:  1,000 mg


Metformin HCl (Glucophage)  1,000 mg PO BID [facility]


   Last Admin: 07/14/17 08:22 Dose:  1,000 mg


Metoprolol Succinate (Toprol Xl)  100 mg PO DAILY [facility]


   Last Admin: 07/12/17 09:10 Dose:  100 mg


Metoprolol Tartrate (Lopressor)  50 mg PO Q12HR [facility]


   Last Admin: 07/14/17 08:23 Dose:  50 mg


Metoprolol Tartrate (Lopressor)  5 mg IVPUSH Q6H PRN


   PRN Reason: heart rate> 120


Miscellaneous Information (Remove Patch)  0 ea TRDERM DAILY [facility]


   Last Admin: 07/14/17 11:35 Dose:  Not Given


Nicotine (Habitrol)  21 mg TRDERM DAILY [facility]


   Last Admin: 07/14/17 08:28 Dose:  Not Given


Ondansetron HCl (Zofran)  4 mg IVPUSH ONETIME ONE


   Stop: 07/11/17 12:30


   Last Admin: 07/11/17 12:44 Dose:  4 mg


Ondansetron HCl (Zofran)  4 mg IVPUSH ONETIME ONE


   Stop: 07/12/17 15:46


   Last Admin: 07/12/17 15:57 Dose:  4 mg


Pantoprazole Sodium (Protonix***)  40 mg PO DAILY [facility]


   Last Admin: 07/14/17 08:20 Dose:  40 mg


Pneumococcal 13-Valent Conj Vacc (Prevnar 13)  0.5 ml IM .ONCE ONE


   Stop: 07/14/17 12:33


   Last Admin: 07/14/17 12:47 Dose:  0.5 ml


Potassium Chloride (Potassium Chloride)  40 meq PO ONETIME ONE


   Stop: 07/12/17 14:28


   Last Admin: 07/12/17 15:29 Dose:  40 meq


Rivaroxaban (Xarelto)  20 mg PO QAM [facility]


   Last Admin: 07/12/17 09:10 Dose:  20 mg


Rivaroxaban (Xarelto)  15 mg PO QAM [facility]


   Last Admin: 07/14/17 08:17 Dose:  15 mg


Rosuvastatin Calcium (Crestor)  10 mg PO BEDTIME [facility]


   Last Admin: 07/13/17 21:19 Dose:  10 mg


Sertraline HCl (Zoloft)  25 mg PO BEDTIME [facility]


   Last Admin: 07/13/17 21:19 Dose:  25 mg


Simvastatin (Zocor)  80 mg PO BEDTIME [facility]


   Last Admin: 07/11/17 21:32 Dose:  80 mg


Spironolactone (Aldactone)  25 mg PO DAILY [facility]


   Last Admin: 07/14/17 08:20 Dose:  25 mg


Tamsulosin HCl (Flomax)  0.4 mg PO DAILY [facility]


   Last Admin: 07/14/17 08:23 Dose:  0.4 mg


Temazepam (Restoril)  15 mg PO BEDTIME PRN


   PRN Reason: Sleep


   Last Admin: 07/11/17 22:41 Dose:  15 mg


Temazepam (Restoril)  7.5 mg PO BEDTIME PRN


   PRN Reason: Sleep


Terazosin HCl (Hytrin)  2 mg PO BEDTIME [facility]


   Last Admin: 07/13/17 20:42 Dose:  Not Given


Terazosin HCl (Hytrin)  2 mg PO ONETIME ONE


   Stop: 07/12/17 10:46


   Last Admin: 07/12/17 11:38 Dose:  2 mg











*Q Meaningful Use (DIS)





- VTE *Q


VTE Criteria *Q: 








- Stroke *Q


Stroke Criteria *Q: 








- AMI *Q


AMI Criteria *Q:

## 2018-04-16 NOTE — PCM.POSTAN
POST ANESTHESIA ASSESSMENT





- MENTAL STATUS


Mental Status: Alert, Oriented





- VITAL SIGNS


Pulse Rate: 91


SaO2: 91


Resp Rate: 10


Blood Pressure: 115/70


Temperature: 37.7 C





- RESPIRATORY


Respiratory Status: Respiratory Rate WNL, Airway Patent, O2 Saturation Stable, 

Supplemental Oxygen





- CARDIOVASCULAR


CV Status: Pulse Rate WNL, Blood Pressure Stable





- GASTROINTESTINAL


GI Status: No Symptoms





- PAIN


Pain Score: 10





- POST OP HYDRATION


Hydration Status: Adequate & Stable

## 2018-04-30 ENCOUNTER — HOSPITAL ENCOUNTER (OUTPATIENT)
Dept: HOSPITAL 41 - JD.SDS | Age: 78
Discharge: HOME | End: 2018-04-30
Attending: SURGERY
Payer: MEDICARE

## 2018-04-30 VITALS — SYSTOLIC BLOOD PRESSURE: 122 MMHG | DIASTOLIC BLOOD PRESSURE: 66 MMHG

## 2018-04-30 DIAGNOSIS — I10: ICD-10-CM

## 2018-04-30 DIAGNOSIS — I50.9: ICD-10-CM

## 2018-04-30 DIAGNOSIS — Z79.899: ICD-10-CM

## 2018-04-30 DIAGNOSIS — K29.50: Primary | ICD-10-CM

## 2018-04-30 DIAGNOSIS — Z79.84: ICD-10-CM

## 2018-04-30 DIAGNOSIS — Z79.82: ICD-10-CM

## 2018-04-30 DIAGNOSIS — K44.9: ICD-10-CM

## 2018-04-30 DIAGNOSIS — E78.5: ICD-10-CM

## 2018-04-30 DIAGNOSIS — Z87.891: ICD-10-CM

## 2018-04-30 DIAGNOSIS — F32.9: ICD-10-CM

## 2018-04-30 DIAGNOSIS — J44.9: ICD-10-CM

## 2018-04-30 DIAGNOSIS — E11.9: ICD-10-CM

## 2018-04-30 DIAGNOSIS — K31.89: ICD-10-CM

## 2018-04-30 DIAGNOSIS — K20.9: ICD-10-CM

## 2018-04-30 PROCEDURE — 43239 EGD BIOPSY SINGLE/MULTIPLE: CPT

## 2018-04-30 PROCEDURE — 82962 GLUCOSE BLOOD TEST: CPT

## 2018-04-30 NOTE — [id number]
Post Anesthesia Note





- EVALUATION WITHIN 48HRS OF ANESTHETIC


Vital Signs in Normal Range: Yes


Patient Participated in Evaluation: Yes


Respiratory Function Stable: Yes


Airway Patent: Yes


Cardiovascular Function Stable: Yes


Hydration Status Stable: Yes


Pain Control Satisfactory: Yes


Nausea and Vomiting Control Satisfactory: Yes


Mental Status Recovered: Yes


Pulse Rate: 65


SaO2: 96


Resp Rate: 16


Temperature: 36.8 C


Blood Pressure: 122/66

## 2018-04-30 NOTE — PCM.PREANE
Preanesthetic Assessment





- Procedure


Proposed Procedure: 





Diagnostic EGD





- Anesthesia/Transfusion/Family Hx


Anesthesia History: Prior Anesthesia Without Reaction


Family History of Anesthesia Reaction: No


Transfusion History: No Prior Transfusion(s)


Type of Transfusion Reactions: Reports: Unknown


Intubation History: Unknown


Additional History: 





CKD, 





- Review of Systems


General: No Symptoms


Pulmonary: Other (COPD)


Cardiovascular: Other (Afib, HTN, chest pain, HLD)


Gastrointestinal: No Symptoms


Neurological: No Symptoms, Other (stroke x2 in 2017. )


Other: Reports: Diabetes ( at 0832), Depression





- Physical Assessment


NPO Status Date: 04/30/18


NPO Status Time: 00:01


Pulse: 91


O2 Sat by Pulse Oximetry: 91


Respiratory Rate: 10


Blood Pressure: 115/70


Temperature: 37.7 C


Vital Signs: 





 Last Vital Signs











Temp  37.7 C   04/16/18 16:07


 


Pulse  91   04/16/18 16:07


 


Resp  10 L  04/16/18 16:07


 


BP  115/70   04/16/18 16:07


 


Pulse Ox  91 L  04/16/18 16:07











Height: 1.65 m


Weight: 65.317 kg


ASA Class: 3


Mental Status: Alert & Oriented x3


Airway Class: Mallampati = 3


Dentition: Reports: Normal Dentition


Thyro-Mental Finger Breadths: 3


Mouth Opening Finger Breadths: 3


ROM/Head Extension: Full


Lungs: Clear to Auscultation, Normal Respiratory Effort


Cardiovascular: Regular Rate, Irregular Rhythm (hx of afib)





- Lab


Values: 





 Laboratory Last Values











POC Glucose  133 mg/dL ()  H  04/30/18  08:32    














- Allergies


Allergies/Adverse Reactions: 


 Allergies











Allergy/AdvReac Type Severity Reaction Status Date / Time


 


No Known Allergies Allergy   Verified 04/27/18 13:54














- Blood


Blood Available: No


Product(s) Available: None





- Anesthesia Plan


Beta Blocker: Metoprolol


Med Last Dose Date: 04/30/18


Med Last Dose Time: 07:30





- Acknowledgements


Anesthesia Type Planned: MAC


Pt an Appropriate Candidate for the Planned Anesthesia: Yes


Alternatives and Risks of Anesthesia Discussed w Pt/Guardian: Yes


Pt/Guardian Understands and Agrees with Anesthesia Plan: Yes





PreAnesthesia Questionnaire


HEENT History: Reports: Impaired Vision


Cardiovascular History: Reports: Afib, Angina, Heart Failure, High Cholesterol, 

Hypertension


Respiratory History: Reports: COPD


Gastrointestinal History: Reports: Colon Polyp, GERD, Other (See Below)


Other Gastrointestinal History: pancreatic cyst


Genitourinary History: Reports: Prostate Disorder, Other (See Below)


Other Genitourinary History: bladder cancer


OB/GYN History: Reports: None


Musculoskeletal History: Reports: Back Pain, Chronic, Other (See Below)


Other Musculoskeletal History: torn cartilege to left knee


Neurological History: Reports: CVA, Migraines, TIA


Psychiatric History: Reports: Addiction, Depression


Endocrine/Metabolic History: Reports: Diabetes, Type II


Hematologic History: Reports: None


Immunologic History: Reports: None


Oncologic (Cancer) History: Reports: Bladder


Dermatologic History: Reports: None





- Infectious Disease History


Infectious Disease History: Reports: Chicken Pox, Measles, Mumps, Shingles





- Past Surgical History


Head Surgeries/Procedures: Reports: None


HEENT Surgical History: Reports: None


Cardiovascular Surgical History: Reports: None


Respiratory Surgical History: Reports: None


GI Surgical History: Reports: Appendectomy, Cholecystectomy, Colonoscopy, Hernia

, Inguinal, Other (See Below)


Other GI Surgeries/Procedures: hemorroidectomy


Female  Surgical History: Reports: None


Male  Surgical History: Reports: None, TURP-Transurethral Resection of 

Prostate


Endocrine Surgical History: Reports: None


Neurological Surgical History: Reports: None


Musculoskeletal Surgical History: Reports: Shoulder Surgery


Oncologic Surgical History: Reports: None


Dermatological Surgical History: Reports: None





- SUBSTANCE USE


Smoking Status *Q: Former Smoker


Tobacco Use Within Last Twelve Months: Cigarettes


Second Hand Smoke Exposure: No


Recreational Drug Use History: No





- HOME MEDS


Home Medications: 


 Home Meds





metFORMIN [Glucophage] 1,000 mg PO BID 08/20/15 [History]


Aspirin [Halfprin] 81 mg PO DAILY #30 tab.ec 07/14/17 [Rx]


Ferrous Sulfate 325 mg PO DAILY #30 tablet 07/14/17 [Rx]


Sertraline [Zoloft] 25 mg PO BEDTIME #30 tablet 07/14/17 [Rx]


Acetaminophen [Tylenol] 650 mg PO Q4H PRN 04/15/18 [History]


Bumetanide 0.5 mg PO DAILY 04/15/18 [History]


Fluticasone Propionate [Flonase Allergy Relief] 1 spray NASBOTH DAILY 04/15/18 [

History]


Metoprolol Succinate 100 mg PO QAM 04/15/18 [History]


Omeprazole 20 mg PO DAILY 04/15/18 [History]


Ondansetron HCl [Zofran] 4 mg PO Q6H 04/15/18 [History]


Rivaroxaban [Xarelto] 20 mg PO QPM 04/15/18 [History]


Spironolactone [Aldactone] 25 mg PO QAM 04/15/18 [History]


Tamsulosin [Flomax] 0.4 mg PO QPM 04/15/18 [History]


atorvaSTATin [Lipitor] 40 mg PO QPM 04/15/18 [History]


traMADol [Ultram] 50 mg PO QID PRN 04/15/18 [History]











- CURRENT (IN HOUSE) MEDS


Current Meds: 





 Current Medications





Lactated Ringer's (Ringers, Lactated)  1,000 mls @ 125 mls/hr IV ASDIRECTED [facility]


   Stop: 04/30/18 23:00


Lidocaine/Sodium Bicarbonate (Buffered Lidocaine 1% In Ns 8.4%)  0.25 ml IDERM 

ONETIME PRN


   PRN Reason: Prior to IV Start


   Stop: 04/30/18 18:00


Sodium Chloride (Saline Flush)  10 ml FLUSH ASDIRECTED PRN


   PRN Reason: Keep Vein Open


   Stop: 04/30/18 18:00





Discontinued Medications





Fentanyl (Sublimaze) Confirm Administered Dose 100 mcg .ROUTE .STK-MED ONE


   Stop: 04/16/18 07:07


Lactated Ringer's (Ringers, Lactated)  1,000 mls @ 125 mls/hr IV ASDIRECTED [facility]


   Stop: 04/16/18 23:00


Lidocaine/Sodium Bicarbonate (Buffered Lidocaine 1% In Ns 8.4%)  0.25 ml IDERM 

ONETIME PRN


   PRN Reason: Prior to IV Start


   Stop: 04/16/18 18:00


Propofol (Diprivan  20 Ml) Confirm Administered Dose 200 mg .ROUTE .STK-MED ONE


   Stop: 04/16/18 07:07


Sodium Chloride (Saline Flush)  10 ml FLUSH ASDIRECTED PRN


   PRN Reason: Keep Vein Open


   Stop: 04/16/18 18:00

## 2018-04-30 NOTE — OR
DATE OF OPERATION:  04/30/2018

 

SURGEON:  Mike Frias MD

 

PREOPERATIVE DIAGNOSIS:

Anemia.

 

POSTOPERATIVE DIAGNOSIS:

Anemia.

 

OPERATION PERFORMED:  Esophagogastroduodenoscopy with biopsy done under IV

sedation.

 

FINDINGS:

Superficial erosions in the antrum and some chronic gastritis.  GE junction was

between 38 and 40 cm with a small sliding hiatal hernia.  There was some chronic

carditis just below the GE junction.  The Z-line was sharp.  Rest of the

esophagus did not show any pathology.  The 2nd portion of the duodenum, duodenal

bulb, and pyloric channel were unremarkable.

 

DESCRIPTION OF PROCEDURE:

The patient was taken to the endoscopy room, placed in a supine position,

connected to monitoring equipment, given IV sedation, and placed in left lateral

position with bite block in place.  A video Olympus gastroscope placed in the

posterior oropharynx under direct vision, threaded past the cricopharyngeus,

down the esophagus, into the stomach.  Stomach was insufflated, and the scope

passed through the pylorus to the 2nd portion of the duodenum.  The 2nd portion

of the duodenum, duodenal bulb, and pyloric channel were unremarkable, but the

antrum showed some chronic gastritis and some linear erosions.  These were

biopsied.  J-maneuver showed small sliding hiatal hernia.  Cardia, fundus, and

body of the stomach did not show any gross pathology.  Scope with biopsy of the

antrum was performed, and the scope was then withdrawn to the GE junction which

showed some carditis just below the Z-line.  These areas were biopsied.  Rest of

the esophagus viewed as the scope was withdrawn and was unremarkable.  The

patient tolerated the procedure, sent to recovery room in a stable condition,

and will be followed up in the clinic.  Specimen sent to Pathology in a labeled

container.

 

ANESTHESIA:

 

 

ESTIMATED BLOOD LOSS:

 

 

DD:  04/30/2018 09:38:24

DT:  04/30/2018 15:48:54  MMODAL

Job #:  [id number]

## 2018-04-30 NOTE — PCM.OPNOTE
- General Post-Op/Procedure Note


Date of Surgery/Procedure: 04/30/18


Operative Procedure(s): egd with bx


Pre Op Diagnosis: anemia


Post-Op Diagnosis: Same


Anesthesia Technique: MAC


Primary Surgeon: Mike Frias


EBL in mLs: 0


Complications: None


Condition: Good

## 2018-12-25 ENCOUNTER — HOSPITAL ENCOUNTER (INPATIENT)
Dept: HOSPITAL 41 - JD.ED | Age: 78
LOS: 3 days | Discharge: HOME | DRG: 191 | End: 2018-12-28
Payer: MEDICARE

## 2018-12-25 DIAGNOSIS — T38.0X5A: ICD-10-CM

## 2018-12-25 DIAGNOSIS — R79.89: ICD-10-CM

## 2018-12-25 DIAGNOSIS — H91.90: ICD-10-CM

## 2018-12-25 DIAGNOSIS — E11.42: ICD-10-CM

## 2018-12-25 DIAGNOSIS — F17.200: ICD-10-CM

## 2018-12-25 DIAGNOSIS — Z79.899: ICD-10-CM

## 2018-12-25 DIAGNOSIS — I50.33: ICD-10-CM

## 2018-12-25 DIAGNOSIS — Z86.73: ICD-10-CM

## 2018-12-25 DIAGNOSIS — N17.9: ICD-10-CM

## 2018-12-25 DIAGNOSIS — I48.91: ICD-10-CM

## 2018-12-25 DIAGNOSIS — K21.9: ICD-10-CM

## 2018-12-25 DIAGNOSIS — N42.9: ICD-10-CM

## 2018-12-25 DIAGNOSIS — D72.829: ICD-10-CM

## 2018-12-25 DIAGNOSIS — N40.1: ICD-10-CM

## 2018-12-25 DIAGNOSIS — E11.65: ICD-10-CM

## 2018-12-25 DIAGNOSIS — Z79.01: ICD-10-CM

## 2018-12-25 DIAGNOSIS — Z99.81: ICD-10-CM

## 2018-12-25 DIAGNOSIS — Z79.51: ICD-10-CM

## 2018-12-25 DIAGNOSIS — I69.359: ICD-10-CM

## 2018-12-25 DIAGNOSIS — Z79.84: ICD-10-CM

## 2018-12-25 DIAGNOSIS — Z79.82: ICD-10-CM

## 2018-12-25 DIAGNOSIS — R33.8: ICD-10-CM

## 2018-12-25 DIAGNOSIS — G89.29: ICD-10-CM

## 2018-12-25 DIAGNOSIS — D50.9: ICD-10-CM

## 2018-12-25 DIAGNOSIS — J44.9: ICD-10-CM

## 2018-12-25 DIAGNOSIS — F32.9: ICD-10-CM

## 2018-12-25 DIAGNOSIS — E11.9: ICD-10-CM

## 2018-12-25 DIAGNOSIS — I11.0: ICD-10-CM

## 2018-12-25 DIAGNOSIS — K86.2: ICD-10-CM

## 2018-12-25 DIAGNOSIS — Z85.51: ICD-10-CM

## 2018-12-25 DIAGNOSIS — K21.0: ICD-10-CM

## 2018-12-25 DIAGNOSIS — J44.1: Primary | ICD-10-CM

## 2018-12-25 DIAGNOSIS — I50.32: ICD-10-CM

## 2018-12-25 DIAGNOSIS — M54.9: ICD-10-CM

## 2018-12-25 DIAGNOSIS — E78.5: ICD-10-CM

## 2018-12-25 DIAGNOSIS — G43.909: ICD-10-CM

## 2018-12-25 DIAGNOSIS — E78.00: ICD-10-CM

## 2018-12-25 DIAGNOSIS — H54.7: ICD-10-CM

## 2018-12-25 PROCEDURE — 94640 AIRWAY INHALATION TREATMENT: CPT

## 2018-12-25 PROCEDURE — 85007 BL SMEAR W/DIFF WBC COUNT: CPT

## 2018-12-25 PROCEDURE — 80053 COMPREHEN METABOLIC PANEL: CPT

## 2018-12-25 PROCEDURE — 96374 THER/PROPH/DIAG INJ IV PUSH: CPT

## 2018-12-25 PROCEDURE — 86738 MYCOPLASMA ANTIBODY: CPT

## 2018-12-25 PROCEDURE — 93005 ELECTROCARDIOGRAM TRACING: CPT

## 2018-12-25 PROCEDURE — 86140 C-REACTIVE PROTEIN: CPT

## 2018-12-25 PROCEDURE — 71046 X-RAY EXAM CHEST 2 VIEWS: CPT

## 2018-12-25 PROCEDURE — 99285 EMERGENCY DEPT VISIT HI MDM: CPT

## 2018-12-25 PROCEDURE — 84484 ASSAY OF TROPONIN QUANT: CPT

## 2018-12-25 PROCEDURE — 85027 COMPLETE CBC AUTOMATED: CPT

## 2018-12-25 PROCEDURE — 83880 ASSAY OF NATRIURETIC PEPTIDE: CPT

## 2018-12-25 PROCEDURE — 36415 COLL VENOUS BLD VENIPUNCTURE: CPT

## 2018-12-25 RX ADMIN — SODIUM CHLORIDE SCH MG: 0.9 INJECTION, SOLUTION INTRAVENOUS at 20:38

## 2018-12-25 RX ADMIN — INSULIN LISPRO SCH UNITS: 100 INJECTION, SOLUTION INTRAVENOUS; SUBCUTANEOUS at 21:01

## 2018-12-25 RX ADMIN — ONDANSETRON SCH MG: 4 TABLET, ORALLY DISINTEGRATING ORAL at 20:38

## 2018-12-25 NOTE — EDM.PDOC
ED HPI GENERAL MEDICAL PROBLEM





- General


Chief Complaint: Respiratory Problem


Stated Complaint: SOB


Time Seen by Provider: 12/25/18 14:30


Source of Information: Reports: Patient


History Limitations: Reports: No Limitations





- History of Present Illness


INITIAL COMMENTS - FREE TEXT/NARRATIVE: 





78-year-old male presents for evaluation and treatment of shortness of breath. 

Reportedly he has been more short of breath for the last few weeks with the 

last few days been much worse than normal. He is coughing and reports a 

productive cough. He reports that the dyspnea is much worse on exertion. He 

denies any fevers, chills, nausea, vomiting, chest pain or abdominal pain. He 

has not appreciated any swelling in his legs. Has not appreciated any weight 

gain.





Review the patient's records shows a past medical history of congestive heart 

failure and COPD. He does receive DuoNeb treatments. He is a resident of [facility].





- Related Data


 Allergies











Allergy/AdvReac Type Severity Reaction Status Date / Time


 


No Known Allergies Allergy   Verified 12/25/18 14:29











Home Meds: 


 Home Meds





Aspirin [Halfprin] 81 mg PO DAILY #30 tab.ec 07/14/17 [Rx]


Ferrous Sulfate 325 mg PO DAILY #30 tablet 07/14/17 [Rx]


Sertraline [Zoloft] 25 mg PO BEDTIME #30 tablet 07/14/17 [Rx]


Acetaminophen [Tylenol] 650 mg PO Q4H PRN 04/15/18 [History]


Bumetanide 0.5 mg PO DAILY 04/15/18 [History]


Fluticasone Propionate [Flonase Allergy Relief] 1 spray NASBOTH DAILY 04/15/18 [

History]


Metoprolol Succinate 100 mg PO QAM 04/15/18 [History]


Omeprazole 20 mg PO DAILY 04/15/18 [History]


Ondansetron HCl [Zofran] 4 mg PO Q6H 04/15/18 [History]


Rivaroxaban [Xarelto] 20 mg PO QPM 04/15/18 [History]


Spironolactone [Aldactone] 25 mg PO QAM 04/15/18 [History]


Tamsulosin [Flomax] 0.4 mg PO QPM 04/15/18 [History]


atorvaSTATin [Lipitor] 40 mg PO QPM 04/15/18 [History]


traMADol [Ultram] 50 mg PO QID PRN 04/15/18 [History]


Albuterol/Ipratropium [DuoNeb 3.0-0.5 MG/3 ML] 3 ml INH QID 12/25/18 [History]


SitaGLIPtin [Januvia] 100 mg PO DAILY 12/25/18 [History]











Past Medical History


HEENT History: Reports: Impaired Vision


Cardiovascular History: Reports: Afib, Angina, Heart Failure, High Cholesterol, 

Hypertension


Respiratory History: Reports: COPD


Gastrointestinal History: Reports: Colon Polyp, GERD, Other (See Below)


Other Gastrointestinal History: pancreatic cyst


Genitourinary History: Reports: Prostate Disorder, Other (See Below)


Other Genitourinary History: bladder cancer


OB/GYN History: Reports: None


Musculoskeletal History: Reports: Back Pain, Chronic, Other (See Below)


Other Musculoskeletal History: torn cartilege to left knee


Neurological History: Reports: CVA, Migraines, TIA


Psychiatric History: Reports: Addiction, Depression


Endocrine/Metabolic History: Reports: Diabetes, Type II


Hematologic History: Reports: None


Immunologic History: Reports: None


Oncologic (Cancer) History: Reports: Bladder


Dermatologic History: Reports: None





- Infectious Disease History


Infectious Disease History: Reports: Chicken Pox, Measles, Mumps, Shingles





- Past Surgical History


Head Surgeries/Procedures: Reports: None


HEENT Surgical History: Reports: None


Cardiovascular Surgical History: Reports: None


Respiratory Surgical History: Reports: None


GI Surgical History: Reports: Appendectomy, Cholecystectomy, Colonoscopy, Hernia

, Inguinal, Other (See Below)


Other GI Surgeries/Procedures: hemorroidectomy


Male  Surgical History: Reports: None, TURP-Transurethral Resection of 

Prostate


Endocrine Surgical History: Reports: None


Neurological Surgical History: Reports: None


Musculoskeletal Surgical History: Reports: Shoulder Surgery


Oncologic Surgical History: Reports: None


Dermatological Surgical History: Reports: None





Social & Family History





- Family History


Family Medical History: Noncontributory





- Tobacco Use


Smoking Status *Q: Current Every Day Smoker


Years of Tobacco use: 60


Packs/Tins Daily: 0.2





- Caffeine Use


Caffeine Use: Reports: Coffee





- Recreational Drug Use


Recreational Drug Use: No





- Living Situation & Occupation


Living situation: Reports: , Alone


Occupation: Retired





ED ROS GENERAL





- Review of Systems


Review Of Systems: See Below


Constitutional: Denies: Fever, Chills


Respiratory: Reports: Shortness of Breath, Cough, Sputum


Cardiovascular: Reports: Dyspnea on Exertion.  Denies: Chest Pain, Edema


GI/Abdominal: Denies: Abdominal Pain, Nausea, Vomiting





ED EXAM, GENERAL





- Physical Exam


Exam: See Below


Exam Limited By: No Limitations


General Appearance: Alert, WD/WN, Moderate Distress


Ears: Hearing Loss


Throat/Mouth: Normal Inspection, Normal Voice, No Airway Compromise


Respiratory/Chest: Crackles (right lung base), Wheezing (diffuse expiratory, 

bilateral), Other (tachypnea)


Cardiovascular: Irregularly Irregular, Other (trace edema)


GI/Abdominal: Soft, Non-Tender


Neurological: Alert, Oriented, Normal Cognition


Psychiatric: Normal Affect, Normal Mood


Skin Exam: Warm, Dry, Normal Color





EKG INTERPRETATION


EKG Date: 12/25/18


Time: 15:00


Rhythm: A-Fib


Rate (Beats/Min): 83


Axis: Normal


P-Wave: Present


QRS: Normal


ST-T: Normal


QT: Normal


EKG Interpretation Comments: 





a.fib with a rate of 83 bpm. No acute changes. Reviewed by myself and Dr. Meza. 





Course





- Vital Signs


Last Recorded V/S: 


 Last Vital Signs











Temp  97.5 F   12/25/18 20:39


 


Pulse  92   12/25/18 20:39


 


Resp  20   12/25/18 20:39


 


BP  101/54 L  12/25/18 20:39


 


Pulse Ox  95   12/25/18 20:39














- Orders/Labs/Meds


Orders: 


 Active Orders 24 hr











 Category Date Time Status


 


 RT Aerosol Therapy [RC] ASDIRECTED Care  12/25/18 16:03 Active


 


 EKG 12 Lead [EK] Stat Ther  12/25/18 14:43 Ordered








 Medication Orders





Acetaminophen (Tylenol)  650 mg PO Q4H PRN


   PRN Reason: Pain/Fever


Hydrocodone Bitart/Acetaminophen (Norco 325-5 Mg)  1 tab PO Q4H PRN


   PRN Reason: Pain (moderate 4-6)


Albuterol/Ipratropium (Duoneb 3.0-0.5 Mg/3 Ml)  3 ml INH QID RODRIGO


   Last Admin: 12/25/18 20:07  Dose: 3 ml


Alogliptin Benzoate (Alogliptin)  25 mg PO DAILY [facility]


Aspirin (Halfprin)  81 mg PO DAILY RODRIGO


Azithromycin (Zithromax)  250 mg PO DAILY RODRIGO


Bisacodyl (Dulcolax)  5 mg PO DAILY PRN


   PRN Reason: Constipation


Bumetanide (Bumex)  0.5 mg PO DAILY [facility]


Docusate Sodium (Colace)  100 mg PO BID PRN


   PRN Reason: Constipation


Ferrous Sulfate (Ferrous Sulfate)  325 mg PO DAILY [facility]


Flunisolide (Nasalide Nasal Spray)  0 ml NASBOTH Q12H [facility]


Guaifenesin/Phenylephrine HCl (Robitussin Dm)  10 ml PO TID@0700,1400,2100 [facility]


   Last Admin: 12/25/18 20:38  Dose: 10 ml


Guaifenesin/Phenylephrine HCl (Robitussin Dm)  5 ml PO Q4H PRN


   PRN Reason: Cough


Promethazine HCl 6.25 mg/ (Sodium Chloride)  50.25 mls @ 100 mls/hr IV Q6H PRN


   PRN Reason: Nausea/Vomiting


Sodium Chloride (Normal Saline)  1,000 mls @ 50 mls/hr IV ASDIRECTED [facility]


Insulin Human Lispro (Humalog)  0 unit SUBCUT QIDACANDBED [facility]; Protocol


   Last Admin: 12/25/18 21:01  Dose: 4 units


Lorazepam (Ativan)  0.25 mg IV Q6H PRN


   PRN Reason: Anxiety


Methylprednisolone Sodium Succinate (Solu-Medrol)  60 mg IVPUSH Q8H [facility]


   Last Admin: 12/25/18 20:38  Dose: 60 mg


Metoprolol Succinate (Toprol Xl)  100 mg PO QAM [facility]


Miscellaneous Information (Remove Patch)  1 ea TRDERM DAILY [facility]


Morphine Sulfate (Morphine)  0.5 mg IVPUSH Q4H PRN


   PRN Reason: Dyspnea/SOB/CP


   Stop: 12/30/18 17:33


Nicotine (Habitrol)  21 mg TRDERM DAILY PRN


   PRN Reason: Nicotine Dependence


Ondansetron HCl (Zofran Odt)  4 mg PO Q6H [facility]


   Last Admin: 12/25/18 20:38  Dose: 4 mg


Ondansetron HCl (Zofran)  4 mg IV Q6H PRN


   PRN Reason: Nausea/Vomiting


Pantoprazole Sodium (Protonix***)  40 mg PO DAILY [facility]


Polyethylene Glycol (Miralax)  17 gm PO DAILY PRN


   PRN Reason: Constipation


Rivaroxaban (Xarelto)  20 mg PO QPM [facility]


   Last Admin: 12/25/18 19:03  Dose: 20 mg


Rosuvastatin Calcium (Crestor)  10 mg PO BEDTIME RODRIGO


   Last Admin: 12/25/18 20:37  Dose: 10 mg


Senna/Docusate Sodium (Senna Plus)  1 tab PO BID PRN


   PRN Reason: Constipation


Sertraline HCl (Zoloft)  25 mg PO BEDTIME RODRIGO


   Last Admin: 12/25/18 20:37  Dose: 25 mg


Spironolactone (Aldactone)  25 mg PO QAM R[facility]IGO


Tamsulosin HCl (Flomax)  0.4 mg PO QPM ROD[facility]IGO


   Last Admin: 12/25/18 19:04  Dose: 0.4 mg


Temazepam (Restoril)  7.5 mg PO BEDTIME PRN


   PRN Reason: Sleep


   Last Admin: 12/25/18 20:51  Dose: 7.5 mg


Tramadol HCl (Ultram)  50 mg PO QID PRN


   PRN Reason: Pain








Labs: 


 Laboratory Tests











  12/25/18 12/25/18 12/25/18 Range/Units





  12:20 14:33 14:33 


 


WBC   8.57   (4.23-9.07)  K/mm3


 


RBC   3.74 L   (4.63-6.08)  M/mm3


 


Hgb   11.6 L   (13.7-17.5)  gm/L


 


Hct   34.7 L   (40.1-51.0)  %


 


MCV   92.8 H   (79.0-92.2)  fl


 


MCH   31.0   (25.7-32.2)  pg


 


MCHC   33.4   (32.2-35.5)  g/dl


 


RDW Std Deviation   49.9 H   (35.1-43.9)  fL


 


Plt Count   318   (163-337)  K/mm3


 


MPV   8.8 L   (9.4-12.3)  fl


 


Neutrophils % (Manual)   66 H   (40-60)  %


 


Band Neutrophils %   2   (0-10)  %


 


Lymphocytes % (Manual)   23   (20-40)  %


 


Atypical Lymphs %   0   %


 


Monocytes % (Manual)   5   (2-10)  %


 


Eosinophils % (Manual)   4   (0.8-7.0)  %


 


Basophils % (Manual)   0 L   (0.2-1.2)  


 


Platelet Estimate   Adequate   


 


RBC Morph Comment   Normal   


 


Sodium    139  (136-145)  mEq/L


 


Potassium    4.2  (3.5-5.1)  mEq/L


 


Chloride    100  ()  mEq/L


 


Carbon Dioxide    28  (21-32)  mEq/L


 


Anion Gap    15.2 H  (5-15)  


 


BUN    24 H  (7-18)  mg/dL


 


Creatinine    2.3 H  (0.7-1.3)  mg/dL


 


Est Cr Clr Drug Dosing    24.23  mL/min


 


Estimated GFR (MDRD)    28  (>60)  mL/min


 


BUN/Creatinine Ratio    10.4 L  (14-18)  


 


Glucose    164 H  ()  mg/dL


 


Calcium    8.9  (8.5-10.1)  mg/dL


 


Total Bilirubin    0.6  (0.2-1.0)  mg/dL


 


AST    19  (15-37)  U/L


 


ALT    26  (16-63)  U/L


 


Alkaline Phosphatase    97  ()  U/L


 


Troponin I  < 0.017    (0.00-0.056)  ng/mL


 


C-Reactive Protein     (<1.0)  mg/dL


 


NT-Pro-B Natriuret Pep     (0-450)  pg/mL


 


Total Protein    8.1  (6.4-8.2)  g/dl


 


Albumin    3.7  (3.4-5.0)  g/dl


 


Globulin    4.4  gm/dL


 


Albumin/Globulin Ratio    0.8 L  (1-2)  


 


Mycoplasma pneumon IgM     (NEGATIVE)  














  12/25/18 12/25/18 12/25/18 Range/Units





  14:33 14:33 14:33 


 


WBC     (4.23-9.07)  K/mm3


 


RBC     (4.63-6.08)  M/mm3


 


Hgb     (13.7-17.5)  gm/L


 


Hct     (40.1-51.0)  %


 


MCV     (79.0-92.2)  fl


 


MCH     (25.7-32.2)  pg


 


MCHC     (32.2-35.5)  g/dl


 


RDW Std Deviation     (35.1-43.9)  fL


 


Plt Count     (163-337)  K/mm3


 


MPV     (9.4-12.3)  fl


 


Neutrophils % (Manual)     (40-60)  %


 


Band Neutrophils %     (0-10)  %


 


Lymphocytes % (Manual)     (20-40)  %


 


Atypical Lymphs %     %


 


Monocytes % (Manual)     (2-10)  %


 


Eosinophils % (Manual)     (0.8-7.0)  %


 


Basophils % (Manual)     (0.2-1.2)  


 


Platelet Estimate     


 


RBC Morph Comment     


 


Sodium     (136-145)  mEq/L


 


Potassium     (3.5-5.1)  mEq/L


 


Chloride     ()  mEq/L


 


Carbon Dioxide     (21-32)  mEq/L


 


Anion Gap     (5-15)  


 


BUN     (7-18)  mg/dL


 


Creatinine     (0.7-1.3)  mg/dL


 


Est Cr Clr Drug Dosing     mL/min


 


Estimated GFR (MDRD)     (>60)  mL/min


 


BUN/Creatinine Ratio     (14-18)  


 


Glucose     ()  mg/dL


 


Calcium     (8.5-10.1)  mg/dL


 


Total Bilirubin     (0.2-1.0)  mg/dL


 


AST     (15-37)  U/L


 


ALT     (16-63)  U/L


 


Alkaline Phosphatase     ()  U/L


 


Troponin I     (0.00-0.056)  ng/mL


 


C-Reactive Protein    1.9 H*  (<1.0)  mg/dL


 


NT-Pro-B Natriuret Pep  3745 H    (0-450)  pg/mL


 


Total Protein     (6.4-8.2)  g/dl


 


Albumin     (3.4-5.0)  g/dl


 


Globulin     gm/dL


 


Albumin/Globulin Ratio     (1-2)  


 


Mycoplasma pneumon IgM   Negative   (NEGATIVE)  











Meds: 


Medications











Generic Name Dose Route Start Last Admin





  Trade Name Freq  PRN Reason Stop Dose Admin


 


Acetaminophen  650 mg  12/25/18 17:27  





  Tylenol  PO   





  Q4H PRN   





  Pain/Fever   





     





     





     


 


Hydrocodone Bitart/Acetaminophen  1 tab  12/25/18 17:30  





  Norco 325-5 Mg  PO   





  Q4H PRN   





  Pain (moderate 4-6)   





     





     





     


 


Albuterol/Ipratropium  3 ml  12/25/18 21:00  12/25/18 20:07





  Duoneb 3.0-0.5 Mg/3 Ml  INH   3 ml





  QID RODRIGO   Administration





     





     





     





     


 


Alogliptin Benzoate  25 mg  12/26/18 09:00  





  Alogliptin  PO   





  DAILY [facility]   





     





     





     





     


 


Aspirin  81 mg  12/26/18 09:00  





  Halfprin  PO   





  DAILY R[facility]IGO   





     





     





     





     


 


Azithromycin  250 mg  12/26/18 09:00  





  Zithromax  PO   





  DAILY [facility]   





     





     





     





     


 


Bisacodyl  5 mg  12/25/18 17:34  





  Dulcolax  PO   





  DAILY PRN   





  Constipation   





     





     





     


 


Bumetanide  0.5 mg  12/27/18 22:19  





  Bumex  PO   





  DAILY [facility]   





     





     





     





     


 


Docusate Sodium  100 mg  12/25/18 17:34  





  Colace  PO   





  BID PRN   





  Constipation   





     





     





     


 


Ferrous Sulfate  325 mg  12/26/18 09:00  





  Ferrous Sulfate  PO   





  DAILY [facility]   





     





     





     





     


 


Flunisolide  0 ml  12/26/18 09:00  





  Nasalide Nasal Spray  NASBOTH   





  Q12H RODRIGO   





     





     





     





     


 


Guaifenesin/Phenylephrine HCl  10 ml  12/25/18 21:00  12/25/18 20:38





  Robitussin Dm  PO   10 ml





  TID@0700,1400,2100 RODRIGO   Administration





     





     





     





     


 


Guaifenesin/Phenylephrine HCl  5 ml  12/25/18 18:24  





  Robitussin Dm  PO   





  Q4H PRN   





  Cough   





     





     





     


 


Promethazine HCl 6.25 mg/  50.25 mls @ 100 mls/hr  12/25/18 17:34  





  Sodium Chloride  IV   





  Q6H PRN   





  Nausea/Vomiting   





     





     





     


 


Sodium Chloride  1,000 mls @ 50 mls/hr  12/25/18 18:00  





  Normal Saline  IV   





  ASDIRECTED [facility]   





     





     





     





     


 


Insulin Human Lispro  0 unit  12/25/18 22:00  12/25/18 21:01





  Humalog  SUBCUT   4 units





  QIDACANDBED [facility]   Administration





     





     





  Protocol   





     


 


Lorazepam  0.25 mg  12/25/18 17:34  





  Ativan  IV   





  Q6H PRN   





  Anxiety   





     





     





     


 


Methylprednisolone Sodium Succinate  60 mg  12/25/18 21:00  12/25/18 20:38





  Solu-Medrol  IVPUSH   60 mg





  Q8H [facility]   Administration





     





     





     





     


 


Metoprolol Succinate  100 mg  12/26/18 08:00  





  Toprol Xl  PO   





  QAM [facility]   





     





     





     





     


 


Miscellaneous Information  1 ea  12/26/18 09:00  





  Remove Patch  TRDERM   





  DAILY [facility]   





     





     





     





     


 


Morphine Sulfate  0.5 mg  12/25/18 17:30  





  Morphine  IVPUSH  12/30/18 17:33  





  Q4H PRN   





  Dyspnea/SOB/CP   





     





     





     


 


Nicotine  21 mg  12/25/18 22:13  





  Habitrol  TRDERM   





  DAILY PRN   





  Nicotine Dependence   





     





     





     


 


Ondansetron HCl  4 mg  12/25/18 20:00  12/25/18 20:38





  Zofran Odt  PO   4 mg





  Q6H [facility]   Administration





     





     





     





     


 


Ondansetron HCl  4 mg  12/25/18 17:34  





  Zofran  IV   





  Q6H PRN   





  Nausea/Vomiting   





     





     





     


 


Pantoprazole Sodium  40 mg  12/26/18 09:00  





  Protonix***  PO   





  DAILY [facility]   





     





     





     





     


 


Polyethylene Glycol  17 gm  12/25/18 17:34  





  Miralax  PO   





  DAILY PRN   





  Constipation   





     





     





     


 


Rivaroxaban  20 mg  12/25/18 18:00  12/25/18 19:03





  Xarelto  PO   20 mg





  QPM RODRIGO   Administration





     





     





     





     


 


Rosuvastatin Calcium  10 mg  12/25/18 21:00  12/25/18 20:37





  Crestor  PO   10 mg





  BEDTIME RODRIGO   Administration





     





     





     





     


 


Senna/Docusate Sodium  1 tab  12/25/18 17:34  





  Senna Plus  PO   





  BID PRN   





  Constipation   





     





     





     


 


Sertraline HCl  25 mg  12/25/18 21:00  12/25/18 20:37





  Zoloft  PO   25 mg





  BEDTIME RODRIGO   Administration





     





     





     





     


 


Spironolactone  25 mg  12/26/18 08:00  





  Aldactone  PO   





  QAM RODRIGO   





     





     





     





     


 


Tamsulosin HCl  0.4 mg  12/25/18 18:00  12/25/18 19:04





  Flomax  PO   0.4 mg





  QPM RODRIGO   Administration





     





     





     





     


 


Temazepam  7.5 mg  12/25/18 17:34  12/25/18 20:51





  Restoril  PO   7.5 mg





  BEDTIME PRN   Administration





  Sleep   





     





     





     


 


Tramadol HCl  50 mg  12/25/18 17:27  





  Ultram  PO   





  QID PRN   





  Pain   





     





     





     














Discontinued Medications














Generic Name Dose Route Start Last Admin





  Trade Name Freq  PRN Reason Stop Dose Admin


 


Albuterol  2.5 mg  12/25/18 16:02  12/25/18 16:33





  Proventil Neb Soln  NEB  12/25/18 16:03  2.5 mg





  ONETIME ONE   Administration





     





     





     





     


 


Albuterol/Ipratropium  3 ml  12/25/18 14:43  12/25/18 15:02





  Duoneb 3.0-0.5 Mg/3 Ml  NEB  12/25/18 14:44  3 ml





  ONETIME ONE   Administration





     





     





     





     


 


Bumetanide  0.5 mg  12/26/18 09:00  





  Bumex  PO   





  DAILY RODRIGO   





     





     





     





     


 


Furosemide  20 mg  12/25/18 16:38  12/25/18 16:46





  Lasix  IVPUSH  12/25/18 16:39  20 mg





  ONETIME ONE   Administration





     





     





     





     


 


Azithromycin 500 mg/ Sodium  250 mls @ 250 mls/hr  12/25/18 17:39  12/25/18 19:

03





  Chloride  IV  12/25/18 18:38  250 mls/hr





  ONETIME ONE   Administration





     





     





     





     


 


Sodium Chloride  500 mls @ 999 mls/hr  12/25/18 17:49  12/25/18 18:56





  Normal Saline  IV  12/25/18 18:19  999 mls/hr





  .BOLUS ONE   Administration





     





     





     





     


 


Methylprednisolone Sodium Succinate  60 mg  12/26/18 06:00  





  Solu-Medrol  IVPUSH   





  Q8H RODRIGO   





     





     





     





     


 


Prednisone  20 mg  12/25/18 16:38  12/25/18 16:46





  Prednisone  PO  12/25/18 16:39  20 mg





  ONETIME ONE   Administration





     





     





     





     














- Radiology Interpretation


Free Text/Narrative:: 





Chest: 2 views of the chest were obtained.


Comparison: Prior chest x-ray of 07/12/17.


Heart size is slightly enlarged.  Tortuous thoracic aorta is noted.  Minimal 

pulmonary vascular congestion is seen.  Lungs otherwise are clear.  Bony 

structures appear within normal limits for the patient's age.  Surgical clip is 

noted within the upper abdomen.


Impression:


1.  Findings suspicious for minimal pulmonary vascular congestion.  Mild 

cardiomegaly is noted.


2.  Other incidental findings.





- Re-Assessments/Exams


Free Text/Narrative Re-Assessment/Exam: 





12/25/18 17:20


Reviewed the labs, ekg and imaging with the patient and his son. 





Patient sounds improved after the nebulizer treatments but continues to have 

wheezing. Had nursing staff ambulate with a pulse ox with the patient. Low 90s 

on room air, increased work of breathing with ambulation. 





I do feel the patient needs to be admitted for COPD exacerbation and CHF 

exacerbation. He is agreeable to this.





Discussed t he case with Dr. Cadena, hospitalist on call, he agrees to the 

admission. Would like the patient admitted to the ICU. 





Departure





- Departure


Time of Disposition: 17:25


Disposition: Admitted As Inpatient 65


Condition: Fair


Clinical Impression: 


 COPD (chronic obstructive pulmonary disease)





Congestive heart failure


Qualifiers:


 Qualified Code(s): I50.33 - Acute on chronic diastolic (congestive) heart 

failure








- Discharge Information


*PRESCRIPTION DRUG MONITORING PROGRAM REVIEWED*: No


*COPY OF PRESCRIPTION DRUG MONITORING REPORT IN PATIENT CARA: No





- My Orders


Last 24 Hours: 


My Active Orders





12/25/18 14:43


EKG 12 Lead [EK] Stat 





12/25/18 16:03


RT Aerosol Therapy [RC] ASDIRECTED 














- Assessment/Plan


Last 24 Hours: 


My Active Orders





12/25/18 14:43


EKG 12 Lead [EK] Stat 





12/25/18 16:03


RT Aerosol Therapy [RC] ASDIRECTED

## 2018-12-25 NOTE — PCM.HP
H&P History of Present Illness





- General


Date of Service: 12/25/18


Admit Problem/Dx: 


 Admission Diagnosis/Problem





Admission Diagnosis/Problem      Exacerbation of chronic obstructive pulmonary


                                 disease associated with volcanic smog exposure








Source of Information: Patient, Old Records, Provider, RN Notes Reviewed


History Limitations: Reports: Respiratory Distress, Other (Impaired Hearing)





- History of Present Illness


Initial Comments - Free Text/Narative: 


This is a 79 yo elderly white male with past medical hx/o Impaired Vision/

Hearing, Atrial Fibrillation on Xarelto and Metoprolol, HTN, HLD, COPD, Unknown 

Severity, GERD w/ Esophagitis, AISLINN, BPH S/p TURP, Pancreatic Cyst, Hx/o Bladder 

CA, Back Pain, Migraine HAs, Nicotine Dependence, Hx/o TIA and CVA w/ 

Hemiplegia and Hemiparesis, DM2 on Januvia, Depression, and Polyneuropathy who 

comes in for worsening dyspnea and audible wheezing that started since past 

Sunday. He has been getting routine nebs treatment but w/o much relief.  





His initial work up in ED shows a CBC remarkable for RBC of 3.74, Hgb of 11.6, 

Hct of 34.7, MCV of 92.8, RDW of 49.9, MPV of 8.8, and Neutrophils of 66%. His 

chemistry is significant for AG of 15.2, BUN of 24, Cr of 2.3, BS of 164, CRP 

of 1.9, and ProBNP of 3745. His chest x-ray shows some minimal lung congestion.





Patient is being admitted for COPD exacerbation. He is full code. 








- Related Data


Allergies/Adverse Reactions: 


 Allergies











Allergy/AdvReac Type Severity Reaction Status Date / Time


 


No Known Allergies Allergy   Verified 12/25/18 14:29











Home Medications: 


 Home Meds





Aspirin [Halfprin] 81 mg PO DAILY #30 tab.ec 07/14/17 [Rx]


Ferrous Sulfate 325 mg PO DAILY #30 tablet 07/14/17 [Rx]


Sertraline [Zoloft] 25 mg PO BEDTIME #30 tablet 07/14/17 [Rx]


Acetaminophen [Tylenol] 650 mg PO Q4H PRN 04/15/18 [History]


Bumetanide 0.5 mg PO DAILY 04/15/18 [History]


Fluticasone Propionate [Flonase Allergy Relief] 1 spray NASBOTH DAILY 04/15/18 [

History]


Metoprolol Succinate 100 mg PO QAM 04/15/18 [History]


Omeprazole 20 mg PO DAILY 04/15/18 [History]


Ondansetron HCl [Zofran] 4 mg PO Q6H 04/15/18 [History]


Rivaroxaban [Xarelto] 20 mg PO QPM 04/15/18 [History]


Spironolactone [Aldactone] 25 mg PO QAM 04/15/18 [History]


Tamsulosin [Flomax] 0.4 mg PO QPM 04/15/18 [History]


atorvaSTATin [Lipitor] 40 mg PO QPM 04/15/18 [History]


traMADol [Ultram] 50 mg PO QID PRN 04/15/18 [History]


Albuterol/Ipratropium [DuoNeb 3.0-0.5 MG/3 ML] 3 ml INH QID 12/25/18 [History]


SitaGLIPtin [Januvia] 100 mg PO DAILY 12/25/18 [History]











Past Medical History


HEENT History: Reports: Impaired Vision


Cardiovascular History: Reports: Afib, Angina, Heart Failure, High Cholesterol, 

Hypertension


Respiratory History: Reports: COPD


Gastrointestinal History: Reports: Colon Polyp, GERD, Other (See Below)


Other Gastrointestinal History: pancreatic cyst


Genitourinary History: Reports: Prostate Disorder, Other (See Below)


Other Genitourinary History: bladder cancer


OB/GYN History: Reports: None


Musculoskeletal History: Reports: Back Pain, Chronic, Other (See Below)


Other Musculoskeletal History: torn cartilege to left knee


Neurological History: Reports: CVA, Migraines, TIA


Psychiatric History: Reports: Addiction, Depression


Endocrine/Metabolic History: Reports: Diabetes, Type II


Hematologic History: Reports: None


Immunologic History: Reports: None


Oncologic (Cancer) History: Reports: Bladder


Dermatologic History: Reports: None





- Infectious Disease History


Infectious Disease History: Reports: Chicken Pox, Measles, Mumps, Shingles





- Past Surgical History


Head Surgeries/Procedures: Reports: None


HEENT Surgical History: Reports: None


Cardiovascular Surgical History: Reports: None


Respiratory Surgical History: Reports: None


GI Surgical History: Reports: Appendectomy, Cholecystectomy, Colonoscopy, Hernia

, Inguinal, Other (See Below)


Other GI Surgeries/Procedures: hemorroidectomy


Male  Surgical History: Reports: None, TURP-Transurethral Resection of 

Prostate


Endocrine Surgical History: Reports: None


Neurological Surgical History: Reports: None


Musculoskeletal Surgical History: Reports: Shoulder Surgery


Oncologic Surgical History: Reports: None


Dermatological Surgical History: Reports: None





Social & Family History





- Family History


Family Medical History: Noncontributory





- Tobacco Use


Smoking Status *Q: Current Every Day Smoker


Years of Tobacco use: 60


Packs/Tins Daily: 0.2





- Caffeine Use


Caffeine Use: Reports: Coffee





- Recreational Drug Use


Recreational Drug Use: No





- Living Situation & Occupation


Living situation: Reports: , Alone


Occupation: Retired





H&P Review of Systems





- Review of Systems:


Review Of Systems: See Below


General: Denies: Fever, Chills, Malaise, Weakness, Fatigue


HEENT: Reports: No Symptoms


Pulmonary: Reports: Shortness of Breath, Wheezing, Cough, Sputum


Cardiovascular: Reports: Dyspnea on Exertion.  Denies: Chest Pain, 

Lightheadedness, Syncope, Claudication, Blood Pressure Problem


Gastrointestinal: Denies: Abdominal Pain, Nausea, Vomiting


Genitourinary: Reports: No Symptoms


Musculoskeletal: Reports: Back Pain


Skin: Denies: Cyanosis, Pallor, Diaphoresis


Psychiatric: Denies: Depression, Anxiety, Agitation, Hallucinations


Neurological: Denies: Confusion, Difficulty Walking, Weakness, Gait Disturbance


Hematologic/Lymphatic: Reports: No Symptoms


Immunologic: Reports: No Symptoms





Exam





- Exam


Exam: See Below





- Vital Signs


Vital Signs: 


 Last Vital Signs











Temp  36.4 C   12/25/18 20:39


 


Pulse  92   12/25/18 20:39


 


Resp  20   12/25/18 20:39


 


BP  101/54 L  12/25/18 20:39


 


Pulse Ox  95   12/25/18 20:39











Weight: 65.181 kg





- Exam


Quality Assessment: Supplemental Oxygen


General: Alert, Oriented, Cooperative, Mild Distress


HEENT: Conjunctiva Clear, Mucosa Moist & Pink, Nares Patent, Normal Nasal Septum

, Posterior Pharynx Clear, Pupils Equal, Pupils Reactive.  No: Hearing Intact


Neck: Supple, Trachea Midline, Full Range of Motion, Other (No accessory muscle 

use).  No: JVD


Lungs: Normal Respiratory Effort, Rhonchi, Wheezing, Other (audible wheezing)


Cardiovascular: Irregular Rhythm


GI/Abdominal Exam: Normal Bowel Sounds, Soft, Non-Tender, No Organomegaly, No 

Distention, No Abnormal Bruit


 (Male) Exam: Deferred


Rectal (Males) Exam: Deferred


Back Exam: Normal Inspection, Decreased Range of Motion


Extremities: Normal Inspection, Non-Tender, No Pedal Edema, Normal Capillary 

Refill


Peripheral Pulses: 2+: Posterior Tibial (L), Posterior Tibial (R), Dorsalis 

Pedis (L), Dorsalis Pedis (R)


Skin: Warm, Dry, Intact


Neuro Extensive - Mental Status: Normal Cognition, Memory Intact


Neuro Extensive - Motor, Sensory, Reflexes: Other (deferred: not able to follow 

commands due to severe hearing impairment)


Psychiatric: Alert, Normal Affect, Normal Mood





- Patient Data


Lab Results Last 24 hrs: 


 Laboratory Results - last 24 hr











  12/25/18 12/25/18 12/25/18 Range/Units





  12:20 14:33 14:33 


 


WBC   8.57   (4.23-9.07)  K/mm3


 


RBC   3.74 L   (4.63-6.08)  M/mm3


 


Hgb   11.6 L   (13.7-17.5)  gm/L


 


Hct   34.7 L   (40.1-51.0)  %


 


MCV   92.8 H   (79.0-92.2)  fl


 


MCH   31.0   (25.7-32.2)  pg


 


MCHC   33.4   (32.2-35.5)  g/dl


 


RDW Std Deviation   49.9 H   (35.1-43.9)  fL


 


Plt Count   318   (163-337)  K/mm3


 


MPV   8.8 L   (9.4-12.3)  fl


 


Neutrophils % (Manual)   66 H   (40-60)  %


 


Band Neutrophils %   2   (0-10)  %


 


Lymphocytes % (Manual)   23   (20-40)  %


 


Atypical Lymphs %   0   %


 


Monocytes % (Manual)   5   (2-10)  %


 


Eosinophils % (Manual)   4   (0.8-7.0)  %


 


Basophils % (Manual)   0 L   (0.2-1.2)  


 


Platelet Estimate   Adequate   


 


RBC Morph Comment   Normal   


 


Sodium    139  (136-145)  mEq/L


 


Potassium    4.2  (3.5-5.1)  mEq/L


 


Chloride    100  ()  mEq/L


 


Carbon Dioxide    28  (21-32)  mEq/L


 


Anion Gap    15.2 H  (5-15)  


 


BUN    24 H  (7-18)  mg/dL


 


Creatinine    2.3 H  (0.7-1.3)  mg/dL


 


Est Cr Clr Drug Dosing    24.23  mL/min


 


Estimated GFR (MDRD)    28  (>60)  mL/min


 


BUN/Creatinine Ratio    10.4 L  (14-18)  


 


Glucose    164 H  ()  mg/dL


 


POC Glucose     ()  mg/dL


 


Calcium    8.9  (8.5-10.1)  mg/dL


 


Total Bilirubin    0.6  (0.2-1.0)  mg/dL


 


AST    19  (15-37)  U/L


 


ALT    26  (16-63)  U/L


 


Alkaline Phosphatase    97  ()  U/L


 


Troponin I  < 0.017    (0.00-0.056)  ng/mL


 


C-Reactive Protein     (<1.0)  mg/dL


 


NT-Pro-B Natriuret Pep     (0-450)  pg/mL


 


Total Protein    8.1  (6.4-8.2)  g/dl


 


Albumin    3.7  (3.4-5.0)  g/dl


 


Globulin    4.4  gm/dL


 


Albumin/Globulin Ratio    0.8 L  (1-2)  


 


Mycoplasma pneumon IgM     (NEGATIVE)  


 


MRSA (PCR)     














  12/25/18 12/25/18 12/25/18 Range/Units





  14:33 14:33 14:33 


 


WBC     (4.23-9.07)  K/mm3


 


RBC     (4.63-6.08)  M/mm3


 


Hgb     (13.7-17.5)  gm/L


 


Hct     (40.1-51.0)  %


 


MCV     (79.0-92.2)  fl


 


MCH     (25.7-32.2)  pg


 


MCHC     (32.2-35.5)  g/dl


 


RDW Std Deviation     (35.1-43.9)  fL


 


Plt Count     (163-337)  K/mm3


 


MPV     (9.4-12.3)  fl


 


Neutrophils % (Manual)     (40-60)  %


 


Band Neutrophils %     (0-10)  %


 


Lymphocytes % (Manual)     (20-40)  %


 


Atypical Lymphs %     %


 


Monocytes % (Manual)     (2-10)  %


 


Eosinophils % (Manual)     (0.8-7.0)  %


 


Basophils % (Manual)     (0.2-1.2)  


 


Platelet Estimate     


 


RBC Morph Comment     


 


Sodium     (136-145)  mEq/L


 


Potassium     (3.5-5.1)  mEq/L


 


Chloride     ()  mEq/L


 


Carbon Dioxide     (21-32)  mEq/L


 


Anion Gap     (5-15)  


 


BUN     (7-18)  mg/dL


 


Creatinine     (0.7-1.3)  mg/dL


 


Est Cr Clr Drug Dosing     mL/min


 


Estimated GFR (MDRD)     (>60)  mL/min


 


BUN/Creatinine Ratio     (14-18)  


 


Glucose     ()  mg/dL


 


POC Glucose     ()  mg/dL


 


Calcium     (8.5-10.1)  mg/dL


 


Total Bilirubin     (0.2-1.0)  mg/dL


 


AST     (15-37)  U/L


 


ALT     (16-63)  U/L


 


Alkaline Phosphatase     ()  U/L


 


Troponin I     (0.00-0.056)  ng/mL


 


C-Reactive Protein    1.9 H*  (<1.0)  mg/dL


 


NT-Pro-B Natriuret Pep  3745 H    (0-450)  pg/mL


 


Total Protein     (6.4-8.2)  g/dl


 


Albumin     (3.4-5.0)  g/dl


 


Globulin     gm/dL


 


Albumin/Globulin Ratio     (1-2)  


 


Mycoplasma pneumon IgM   Negative   (NEGATIVE)  


 


MRSA (PCR)     














  12/25/18 12/25/18 Range/Units





  19:05 20:46 


 


WBC    (4.23-9.07)  K/mm3


 


RBC    (4.63-6.08)  M/mm3


 


Hgb    (13.7-17.5)  gm/L


 


Hct    (40.1-51.0)  %


 


MCV    (79.0-92.2)  fl


 


MCH    (25.7-32.2)  pg


 


MCHC    (32.2-35.5)  g/dl


 


RDW Std Deviation    (35.1-43.9)  fL


 


Plt Count    (163-337)  K/mm3


 


MPV    (9.4-12.3)  fl


 


Neutrophils % (Manual)    (40-60)  %


 


Band Neutrophils %    (0-10)  %


 


Lymphocytes % (Manual)    (20-40)  %


 


Atypical Lymphs %    %


 


Monocytes % (Manual)    (2-10)  %


 


Eosinophils % (Manual)    (0.8-7.0)  %


 


Basophils % (Manual)    (0.2-1.2)  


 


Platelet Estimate    


 


RBC Morph Comment    


 


Sodium    (136-145)  mEq/L


 


Potassium    (3.5-5.1)  mEq/L


 


Chloride    ()  mEq/L


 


Carbon Dioxide    (21-32)  mEq/L


 


Anion Gap    (5-15)  


 


BUN    (7-18)  mg/dL


 


Creatinine    (0.7-1.3)  mg/dL


 


Est Cr Clr Drug Dosing    mL/min


 


Estimated GFR (MDRD)    (>60)  mL/min


 


BUN/Creatinine Ratio    (14-18)  


 


Glucose    ()  mg/dL


 


POC Glucose   246 H  ()  mg/dL


 


Calcium    (8.5-10.1)  mg/dL


 


Total Bilirubin    (0.2-1.0)  mg/dL


 


AST    (15-37)  U/L


 


ALT    (16-63)  U/L


 


Alkaline Phosphatase    ()  U/L


 


Troponin I    (0.00-0.056)  ng/mL


 


C-Reactive Protein    (<1.0)  mg/dL


 


NT-Pro-B Natriuret Pep    (0-450)  pg/mL


 


Total Protein    (6.4-8.2)  g/dl


 


Albumin    (3.4-5.0)  g/dl


 


Globulin    gm/dL


 


Albumin/Globulin Ratio    (1-2)  


 


Mycoplasma pneumon IgM    (NEGATIVE)  


 


MRSA (PCR)  Negative   











Result Diagrams: 


 12/26/18 05:28





 12/26/18 05:28





EKG INTERPRETATION


EKG Date: 12/25/18


Time: 15:00


Rhythm: A-Fib


Rate (Beats/Min): 83


Axis: Normal


P-Wave: Absent


QRS: Normal


ST-T: Normal


QT: Normal


Comparison: Change From Previous EKG


Problem List Initiated/Reviewed/Updated: Yes


Orders Last 24hrs: 


 Active Orders 24 hr











 Category Date Time Status


 


 Admission Status [Patient Status] [ADT] Routine ADT  12/25/18 17:13 Active


 


 Accu Check [Blood Glucose Check, Bedside] [RC] Care  12/25/18 17:45 Active





 QIDACANDBED   


 


 Cardiac Monitoring [RC] .AS DIRECTED Care  12/25/18 19:28 Active


 


 Cardiac Monitoring [RC] CONTINUOUS Care  12/25/18 17:31 Active


 


 Flutter Valve Therapy [RT Chest Physiotherapy] [RC] Care  12/25/18 18:25 Active





 ASDIRECTED   


 


 Height and Weight [RC] 04 Care  12/25/18 17:30 Active


 


 Intake and Output [RC] 04,16 Care  12/25/18 17:31 Active


 


 Oxygen Therapy [RC] PRN Care  12/25/18 17:30 Active


 


 RT Aerosol Therapy [RC] ASDIRECTED Care  12/25/18 16:03 Active


 


 RT Incentive Spirometry [RC] Q1HWA Care  12/25/18 18:45 Active


 


 Up With Assistance [RC] ASDIRECTED Care  12/25/18 17:30 Active


 


 Up ad Kimberly [RC] ASDIRECTED Care  12/25/18 17:30 Active


 


 VTE/DVT Education [RC] PER UNIT ROUTINE Care  12/25/18 17:30 Active


 


 Vital Signs [RC] Q4HR Care  12/25/18 17:30 Active


 


 Consult to Case Management/ [CONS] Cons  12/25/18 17:34 Active





 Routine   


 


 Consult to Spiritual Care [CONS] Routine Cons  12/25/18 17:34 Active


 


 OT Evaluation and Treatment [CONS] Routine Cons  12/25/18 17:34 Active


 


 PT Evaluation and Treatment [CONS] Routine Cons  12/25/18 17:34 Active


 


 Respiratory Care Assess and Treatment [CONS] Routine Cons  12/25/18 17:34 

Active


 


 Consistent Carbohydrate Diet [DIET] Diet  12/25/18 Dinner Active


 


 Heart Healthy Diet [DIET] Diet  12/25/18 Dinner Active


 


 Chest 1V Frontal [CR] Routine Exams  12/27/18 07:00 Ordered


 


 Echo Comp wo Cont [US] Stat Exams  12/27/18 07:00 Ordered


 


 BASIC METABOLIC PANEL,BMP [CHEM] AM Lab  12/26/18 05:11 Ordered


 


 BASIC METABOLIC PANEL,BMP [CHEM] AM Lab  12/27/18 05:11 Ordered


 


 BASIC METABOLIC PANEL,BMP [CHEM] AM Lab  12/28/18 05:11 Ordered


 


 BASIC METABOLIC PANEL,BMP [CHEM] AM Lab  12/29/18 05:11 Ordered


 


 BASIC METABOLIC PANEL,BMP [CHEM] AM Lab  12/30/18 05:11 Ordered


 


 C-REACTIVE PROTEIN [CHEM] AM Lab  12/26/18 05:11 Ordered


 


 C-REACTIVE PROTEIN [CHEM] AM Lab  12/27/18 05:11 Ordered


 


 CBC WITH AUTO DIFF [HEME] AM Lab  12/26/18 05:11 Ordered


 


 CBC WITH AUTO DIFF [HEME] AM Lab  12/27/18 05:11 Ordered


 


 CBC WITH AUTO DIFF [HEME] AM Lab  12/28/18 05:11 Ordered


 


 CBC WITH AUTO DIFF [HEME] AM Lab  12/29/18 05:11 Ordered


 


 CBC WITH AUTO DIFF [HEME] AM Lab  12/30/18 05:11 Ordered


 


 CULTURE SPUTUM + SMEAR [RM] Stat Lab  12/25/18 19:05 Ordered


 


 MAGNESIUM [CHEM] AM Lab  12/26/18 05:11 Ordered


 


 MAGNESIUM [CHEM] AM Lab  12/27/18 05:11 Ordered


 


 MAGNESIUM [CHEM] AM Lab  12/28/18 05:11 Ordered


 


 MAGNESIUM [CHEM] AM Lab  12/29/18 05:11 Ordered


 


 RESPIRATORY PANEL Stat Lab  12/25/18 17:40 Received


 


 STREP PNEUMONIAE ANTIGEN [MREF] Stat Lab  12/25/18 20:50 Received


 


 Acetaminophen [Tylenol] Med  12/25/18 17:27 Active





 650 mg PO Q4H PRN   


 


 Acetaminophen/HYDROcodone [Norco 325-5 MG] Med  12/25/18 17:30 Active





 1 tab PO Q4H PRN   


 


 Albuterol/Ipratropium [DuoNeb 3.0-0.5 MG/3 ML] Med  12/25/18 21:00 Active





 3 ml INH QID   


 


 Alogliptin Benzoate [Alogliptin] Med  12/26/18 09:00 Active





 25 mg PO DAILY   


 


 Aspirin [Halfprin] Med  12/26/18 09:00 Active





 81 mg PO DAILY   


 


 Azithromycin [Zithromax] Med  12/26/18 09:00 Active





 250 mg PO DAILY   


 


 Bisacodyl [Dulcolax] Med  12/25/18 17:34 Active





 5 mg PO DAILY PRN   


 


 Bumetanide [Bumex] Med  12/26/18 09:00 Active





 0.5 mg PO DAILY   


 


 Dextromethorphan/guaiFENesin [Robitussin DM] Med  12/25/18 21:00 Active





 10 ml PO TID@0700,1400,2100   


 


 Dextromethorphan/guaiFENesin [Robitussin DM] Med  12/25/18 18:24 Active





 5 ml PO Q4H PRN   


 


 Docusate Sodium [Colace] Med  12/25/18 17:34 Active





 100 mg PO BID PRN   


 


 Docusate Sodium/Sennosides [Senna Plus] Med  12/25/18 17:34 Active





 1 tab PO BID PRN   


 


 Ferrous Sulfate Med  12/26/18 09:00 Active





 325 mg PO DAILY   


 


 Flunisolide [Nasalide Nasal [place]] Med  12/26/18 09:00 Active





 0 ml NASBOTH Q12H   


 


 Insulin Lispro [HumaLOG] Med  12/25/18 22:00 Active





 See Protocol  SUBCUT QIDACANDBED   


 


 LORazepam [Ativan] Med  12/25/18 17:34 Active





 0.25 mg IV Q6H PRN   


 


 Metoprolol Succinate [Toprol XL] Med  12/26/18 08:00 Active





 100 mg PO QAM   


 


 Morphine Med  12/25/18 17:30 Active





 0.5 mg IVPUSH Q4H PRN   


 


 Ondansetron [Zofran ODT] Med  12/25/18 20:00 Active





 4 mg PO Q6H   


 


 Ondansetron [Zofran] Med  12/25/18 17:34 Active





 4 mg IV Q6H PRN   


 


 Pantoprazole [ProTONIX***] Med  12/26/18 09:00 Active





 40 mg PO DAILY   


 


 Polyethylene Glycol 3350 [MiraLAX] Med  12/25/18 17:34 Active





 17 gm PO DAILY PRN   


 


 Promethazine [Phenergan] 6.25 mg Med  12/25/18 17:34 Active





 Sodium Chloride 0.9% [Normal Saline] 50 ml   





 IV Q6H   


 


 Rivaroxaban [Xarelto] Med  12/25/18 18:00 Active





 20 mg PO QPM   


 


 Rosuvastatin [Crestor] Med  12/25/18 21:00 Active





 10 mg PO BEDTIME   


 


 Sertraline [Zoloft] Med  12/25/18 21:00 Active





 25 mg PO BEDTIME   


 


 Sodium Chloride 0.9% [Normal Saline] 1,000 ml Med  12/25/18 18:00 Active





 IV ASDIRECTED   


 


 Spironolactone [Aldactone] Med  12/26/18 08:00 Active





 25 mg PO QAM   


 


 Tamsulosin [Flomax] Med  12/25/18 18:00 Active





 0.4 mg PO QPM   


 


 Temazepam [Restoril] Med  12/25/18 17:34 Active





 7.5 mg PO BEDTIME PRN   


 


 methylPREDNISolone Sod Succ [Solu-MEDROL] Med  12/25/18 21:00 Active





 60 mg IVPUSH Q8H   


 


 traMADol [Ultram] Med  12/25/18 17:27 Active





 50 mg PO QID PRN   


 


 Code Status [Resuscitation Status] Routine Resus Stat  12/25/18 19:31 Ordered


 


 EKG 12 Lead [EK] Stat Ther  12/25/18 14:43 Ordered








 Medication Orders





Acetaminophen (Tylenol)  650 mg PO Q4H PRN


   PRN Reason: Pain/Fever


Hydrocodone Bitart/Acetaminophen (Norco 325-5 Mg)  1 tab PO Q4H PRN


   PRN Reason: Pain (moderate 4-6)


Albuterol/Ipratropium (Duoneb 3.0-0.5 Mg/3 Ml)  3 ml INH QID [facility]


   Last Admin: 12/25/18 20:07  Dose: 3 ml


Alogliptin Benzoate (Alogliptin)  25 mg PO DAILY [facility]


Aspirin (Halfprin)  81 mg PO DAILY [facility]


Azithromycin (Zithromax)  250 mg PO DAILY [facility]


Bisacodyl (Dulcolax)  5 mg PO DAILY PRN


   PRN Reason: Constipation


Bumetanide (Bumex)  0.5 mg PO DAILY [facility]


Docusate Sodium (Colace)  100 mg PO BID PRN


   PRN Reason: Constipation


Ferrous Sulfate (Ferrous Sulfate)  325 mg PO DAILY [facility]


Flunisolide (Nasalide Nasal Spray)  0 ml NASBOTH Q12H [facility]


Guaifenesin/Phenylephrine HCl (Robitussin Dm)  10 ml PO TID@0700,1400,2100 [facility]


   Last Admin: 12/25/18 20:38  Dose: 10 ml


Guaifenesin/Phenylephrine HCl (Robitussin Dm)  5 ml PO Q4H PRN


   PRN Reason: Cough


Promethazine HCl 6.25 mg/ (Sodium Chloride)  50.25 mls @ 100 mls/hr IV Q6H PRN


   PRN Reason: Nausea/Vomiting


Sodium Chloride (Normal Saline)  1,000 mls @ 50 mls/hr IV ASDIRECTED [facility]


Insulin Human Lispro (Humalog)  0 unit SUBCUT QIDACANDBED [facility]; Protocol


   Last Admin: 12/25/18 21:01  Dose: 4 units


Lorazepam (Ativan)  0.25 mg IV Q6H PRN


   PRN Reason: Anxiety


Methylprednisolone Sodium Succinate (Solu-Medrol)  60 mg IVPUSH Q8H [facility]


   Last Admin: 12/25/18 20:38  Dose: 60 mg


Metoprolol Succinate (Toprol Xl)  100 mg PO QAM [facility]


Morphine Sulfate (Morphine)  0.5 mg IVPUSH Q4H PRN


   PRN Reason: Dyspnea/SOB/CP


   Stop: 12/30/18 17:33


Ondansetron HCl (Zofran Odt)  4 mg PO Q6H [facility]


   Last Admin: 12/25/18 20:38  Dose: 4 mg


Ondansetron HCl (Zofran)  4 mg IV Q6H PRN


   PRN Reason: Nausea/Vomiting


Pantoprazole Sodium (Protonix***)  40 mg PO DAILY [facility]


Polyethylene Glycol (Miralax)  17 gm PO DAILY PRN


   PRN Reason: Constipation


Rivaroxaban (Xarelto)  20 mg PO QPM [facility]


   Last Admin: 12/25/18 19:03  Dose: 20 mg


Rosuvastatin Calcium (Crestor)  10 mg PO BEDTIME [facility]


   Last Admin: 12/25/18 20:37  Dose: 10 mg


Senna/Docusate Sodium (Senna Plus)  1 tab PO BID PRN


   PRN Reason: Constipation


Sertraline HCl (Zoloft)  25 mg PO BEDTIME [facility]


   Last Admin: 12/25/18 20:37  Dose: 25 mg


Spironolactone (Aldactone)  25 mg PO QAM RODRIGO


Tamsulosin HCl (Flomax)  0.4 mg PO QPM [facility]


   Last Admin: 12/25/18 19:04  Dose: 0.4 mg


Temazepam (Restoril)  7.5 mg PO BEDTIME PRN


   PRN Reason: Sleep


   Last Admin: 12/25/18 20:51  Dose: 7.5 mg


Tramadol HCl (Ultram)  50 mg PO QID PRN


   PRN Reason: Pain








Assessment/Plan Comment:: 


Assessment/Plan:


Acute:


COPD Exacerbation


   - Unknown severity


   - Worsening Dyspnea with auditory wheezing despite multiple nebs treatment 

since 12/23/2018


   - IV Steroids, Bronchodilators, and Supplemental O2


   - Advised to quit smoking





MEGAN


   - Baseline GFR is 45 and up


   - GFR is 28 on admission


   - This is likely poor fluid intake


   - He is on low dose Bumex; will hold AM dose


   - IV fluids and monitor real function





Elevated ProBNP Level


   - Carries a  hx/o Heart Failure with Preserved EF 


   - However he is not volume overloaded


   - No clinical signs of pulmonary congestion and peripheral edema


   - He is on low dose Bumex


   - 2D echo in AM





Chronic:


Impaired Vision/Hearing


Atrial Fibrillation on Xarelto, HR controlled with BB


HTN


HLD


COPD, Unknown Severity; 2L Supplemental O2 at HS


GERD w/ Esophagitis


AISLINN


BPH S/p TURP


Pancreatic Cyst


Hx/o Bladder CA


Back Pain


Migraine HAs


Nicotine Dependence, Nicotine Patch PRN daily


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


DM2


Depression 


Polyneuropathy





Plan:


Admit to ICU


Routine AM Labs


Resume Home Meds


RT consult 


2D echo in AM 


Fall Precaution


Accu-check QID and ISS Medium level


DVT PPx: Xarelto 


SW/CM for d/c planning


Additional orders as above


Code status: 1

## 2018-12-26 RX ADMIN — INSULIN LISPRO SCH UNITS: 100 INJECTION, SOLUTION INTRAVENOUS; SUBCUTANEOUS at 16:49

## 2018-12-26 RX ADMIN — INSULIN LISPRO SCH UNITS: 100 INJECTION, SOLUTION INTRAVENOUS; SUBCUTANEOUS at 06:02

## 2018-12-26 RX ADMIN — SODIUM CHLORIDE SCH MG: 0.9 INJECTION, SOLUTION INTRAVENOUS at 13:36

## 2018-12-26 RX ADMIN — ONDANSETRON SCH: 4 TABLET, ORALLY DISINTEGRATING ORAL at 09:24

## 2018-12-26 RX ADMIN — SODIUM CHLORIDE SCH MG: 0.9 INJECTION, SOLUTION INTRAVENOUS at 20:11

## 2018-12-26 RX ADMIN — SODIUM CHLORIDE SCH MG: 0.9 INJECTION, SOLUTION INTRAVENOUS at 05:58

## 2018-12-26 RX ADMIN — ONDANSETRON SCH: 4 TABLET, ORALLY DISINTEGRATING ORAL at 14:21

## 2018-12-26 RX ADMIN — ONDANSETRON SCH: 4 TABLET, ORALLY DISINTEGRATING ORAL at 01:24

## 2018-12-26 RX ADMIN — GUAIFENESIN AND DEXTROMETHORPHAN SCH ML: 100; 10 SYRUP ORAL at 13:36

## 2018-12-26 RX ADMIN — GUAIFENESIN AND DEXTROMETHORPHAN SCH ML: 100; 10 SYRUP ORAL at 20:13

## 2018-12-26 RX ADMIN — ONDANSETRON SCH MG: 4 TABLET, ORALLY DISINTEGRATING ORAL at 20:12

## 2018-12-26 RX ADMIN — INSULIN LISPRO SCH UNITS: 100 INJECTION, SOLUTION INTRAVENOUS; SUBCUTANEOUS at 21:20

## 2018-12-26 RX ADMIN — INSULIN LISPRO SCH UNITS: 100 INJECTION, SOLUTION INTRAVENOUS; SUBCUTANEOUS at 12:04

## 2018-12-26 RX ADMIN — GLIPIZIDE SCH MG: 5 TABLET, FILM COATED, EXTENDED RELEASE ORAL at 20:12

## 2018-12-26 RX ADMIN — GUAIFENESIN AND DEXTROMETHORPHAN SCH ML: 100; 10 SYRUP ORAL at 09:35

## 2018-12-26 NOTE — PCM.PN
- General Info


Date of Service: 12/26/18


Admission Dx/Problem (Free Text): 


 Admission Diagnosis/Problem





Admission Diagnosis/Problem      Exacerbation of chronic obstructive pulmonary


                                 disease associated with volcanic smog exposure








Subjective Update: 





In to see Bennett. He is sitting up in bed eating. He has no current complaints 

and states he is feeling good today. His wheezing has improved and is 99% on 

RA. No concerns from nursing. Pending clinical disposition, results, and 

recommendation from therapy, he may be able to D/C back to [place] within the 

next couple of days. 


Functional Status: Reports: Pain Controlled, Tolerating Diet, Urinating





- Review of Systems


General: Reports: No Symptoms


HEENT: Reports: Other ([place])


Pulmonary: Reports: Shortness of Breath, Cough, Sputum, Wheezing


Cardiovascular: Reports: Dyspnea on Exertion


Gastrointestinal: Reports: No Symptoms.  Denies: Abdominal Pain, Diarrhea, 

Nausea, Vomiting


Genitourinary: Reports: No Symptoms


Musculoskeletal: Reports: No Symptoms


Skin: Reports: No Symptoms


Neurological: Reports: No Symptoms.  Denies: Confusion


Psychiatric: Reports: No Symptoms.  Denies: Confusion





- Patient Data


Vitals - Most Recent: 


 Last Vital Signs











Temp  97.5 F   12/26/18 12:00


 


Pulse  88   12/26/18 09:24


 


Resp  20   12/26/18 12:00


 


BP  135/75   12/26/18 12:00


 


Pulse Ox  99   12/26/18 12:52











Weight - Most Recent: 143 lb 11.2 oz


I&O - Last 24 Hours: 


 Intake & Output











 12/25/18 12/26/18 12/26/18





 22:59 06:59 14:59


 


Intake Total 120 800 360


 


Balance 120 800 360











Lab Results Last 24 Hours: 


 Laboratory Results - last 24 hr











  12/25/18 12/25/18 12/25/18 Range/Units





  12:20 14:33 14:33 


 


WBC   8.57   (4.23-9.07)  K/mm3


 


RBC   3.74 L   (4.63-6.08)  M/mm3


 


Hgb   11.6 L   (13.7-17.5)  gm/L


 


Hct   34.7 L   (40.1-51.0)  %


 


MCV   92.8 H   (79.0-92.2)  fl


 


MCH   31.0   (25.7-32.2)  pg


 


MCHC   33.4   (32.2-35.5)  g/dl


 


RDW Std Deviation   49.9 H   (35.1-43.9)  fL


 


Plt Count   318   (163-337)  K/mm3


 


MPV   8.8 L   (9.4-12.3)  fl


 


Neut % (Auto)     (34.0-67.9)  %


 


Lymph % (Auto)     (21.8-53.1)  %


 


Mono % (Auto)     (5.3-12.2)  %


 


Eos % (Auto)     (0.8-7.0)  


 


Baso % (Auto)     (0.1-1.2)  %


 


Neut # (Auto)     (1.78-5.38)  K/mm3


 


Lymph # (Auto)     (1.32-3.57)  K/mm3


 


Mono # (Auto)     (0.30-0.82)  K/mm3


 


Eos # (Auto)     (0.04-0.54)  K/mm3


 


Baso # (Auto)     (0.01-0.08)  K/mm3


 


Neutrophils % (Manual)   66 H   (40-60)  %


 


Band Neutrophils %   2   (0-10)  %


 


Lymphocytes % (Manual)   23   (20-40)  %


 


Atypical Lymphs %   0   %


 


Monocytes % (Manual)   5   (2-10)  %


 


Eosinophils % (Manual)   4   (0.8-7.0)  %


 


Basophils % (Manual)   0 L   (0.2-1.2)  


 


Manual Slide Review     


 


Platelet Estimate   Adequate   


 


RBC Morph Comment   Normal   


 


Sodium    139  (136-145)  mEq/L


 


Potassium    4.2  (3.5-5.1)  mEq/L


 


Chloride    100  ()  mEq/L


 


Carbon Dioxide    28  (21-32)  mEq/L


 


Anion Gap    15.2 H  (5-15)  


 


BUN    24 H  (7-18)  mg/dL


 


Creatinine    2.3 H  (0.7-1.3)  mg/dL


 


Est Cr Clr Drug Dosing    24.23  mL/min


 


Estimated GFR (MDRD)    28  (>60)  mL/min


 


BUN/Creatinine Ratio    10.4 L  (14-18)  


 


Glucose    164 H  ()  mg/dL


 


POC Glucose     ()  mg/dL


 


Calcium    8.9  (8.5-10.1)  mg/dL


 


Magnesium     (1.8-2.4)  mg/dl


 


Total Bilirubin    0.6  (0.2-1.0)  mg/dL


 


AST    19  (15-37)  U/L


 


ALT    26  (16-63)  U/L


 


Alkaline Phosphatase    97  ()  U/L


 


Troponin I  < 0.017    (0.00-0.056)  ng/mL


 


C-Reactive Protein     (<1.0)  mg/dL


 


NT-Pro-B Natriuret Pep     (0-450)  pg/mL


 


Total Protein    8.1  (6.4-8.2)  g/dl


 


Albumin    3.7  (3.4-5.0)  g/dl


 


Globulin    4.4  gm/dL


 


Albumin/Globulin Ratio    0.8 L  (1-2)  


 


Mycoplasma pneumon IgM     (NEGATIVE)  


 


MRSA (PCR)     














  12/25/18 12/25/18 12/25/18 Range/Units





  14:33 14:33 14:33 


 


WBC     (4.23-9.07)  K/mm3


 


RBC     (4.63-6.08)  M/mm3


 


Hgb     (13.7-17.5)  gm/L


 


Hct     (40.1-51.0)  %


 


MCV     (79.0-92.2)  fl


 


MCH     (25.7-32.2)  pg


 


MCHC     (32.2-35.5)  g/dl


 


RDW Std Deviation     (35.1-43.9)  fL


 


Plt Count     (163-337)  K/mm3


 


MPV     (9.4-12.3)  fl


 


Neut % (Auto)     (34.0-67.9)  %


 


Lymph % (Auto)     (21.8-53.1)  %


 


Mono % (Auto)     (5.3-12.2)  %


 


Eos % (Auto)     (0.8-7.0)  


 


Baso % (Auto)     (0.1-1.2)  %


 


Neut # (Auto)     (1.78-5.38)  K/mm3


 


Lymph # (Auto)     (1.32-3.57)  K/mm3


 


Mono # (Auto)     (0.30-0.82)  K/mm3


 


Eos # (Auto)     (0.04-0.54)  K/mm3


 


Baso # (Auto)     (0.01-0.08)  K/mm3


 


Neutrophils % (Manual)     (40-60)  %


 


Band Neutrophils %     (0-10)  %


 


Lymphocytes % (Manual)     (20-40)  %


 


Atypical Lymphs %     %


 


Monocytes % (Manual)     (2-10)  %


 


Eosinophils % (Manual)     (0.8-7.0)  %


 


Basophils % (Manual)     (0.2-1.2)  


 


Manual Slide Review     


 


Platelet Estimate     


 


RBC Morph Comment     


 


Sodium     (136-145)  mEq/L


 


Potassium     (3.5-5.1)  mEq/L


 


Chloride     ()  mEq/L


 


Carbon Dioxide     (21-32)  mEq/L


 


Anion Gap     (5-15)  


 


BUN     (7-18)  mg/dL


 


Creatinine     (0.7-1.3)  mg/dL


 


Est Cr Clr Drug Dosing     mL/min


 


Estimated GFR (MDRD)     (>60)  mL/min


 


BUN/Creatinine Ratio     (14-18)  


 


Glucose     ()  mg/dL


 


POC Glucose     ()  mg/dL


 


Calcium     (8.5-10.1)  mg/dL


 


Magnesium     (1.8-2.4)  mg/dl


 


Total Bilirubin     (0.2-1.0)  mg/dL


 


AST     (15-37)  U/L


 


ALT     (16-63)  U/L


 


Alkaline Phosphatase     ()  U/L


 


Troponin I     (0.00-0.056)  ng/mL


 


C-Reactive Protein    1.9 H*  (<1.0)  mg/dL


 


NT-Pro-B Natriuret Pep  3745 H    (0-450)  pg/mL


 


Total Protein     (6.4-8.2)  g/dl


 


Albumin     (3.4-5.0)  g/dl


 


Globulin     gm/dL


 


Albumin/Globulin Ratio     (1-2)  


 


Mycoplasma pneumon IgM   Negative   (NEGATIVE)  


 


MRSA (PCR)     














  12/25/18 12/25/18 12/26/18 Range/Units





  19:05 20:46 05:28 


 


WBC    5.55  (4.23-9.07)  K/mm3


 


RBC    3.35 L  (4.63-6.08)  M/mm3


 


Hgb    10.3 L  (13.7-17.5)  gm/L


 


Hct    31.1 L  (40.1-51.0)  %


 


MCV    92.8 H  (79.0-92.2)  fl


 


MCH    30.7  (25.7-32.2)  pg


 


MCHC    33.1  (32.2-35.5)  g/dl


 


RDW Std Deviation    49.4 H  (35.1-43.9)  fL


 


Plt Count    251  (163-337)  K/mm3


 


MPV    8.8 L  (9.4-12.3)  fl


 


Neut % (Auto)    88.6 H  (34.0-67.9)  %


 


Lymph % (Auto)    9.2 L  (21.8-53.1)  %


 


Mono % (Auto)    1.4 L  (5.3-12.2)  %


 


Eos % (Auto)    0.2 L  (0.8-7.0)  


 


Baso % (Auto)    0.2  (0.1-1.2)  %


 


Neut # (Auto)    4.92  (1.78-5.38)  K/mm3


 


Lymph # (Auto)    0.51 L  (1.32-3.57)  K/mm3


 


Mono # (Auto)    0.08 L  (0.30-0.82)  K/mm3


 


Eos # (Auto)    0.01 L  (0.04-0.54)  K/mm3


 


Baso # (Auto)    0.01  (0.01-0.08)  K/mm3


 


Neutrophils % (Manual)     (40-60)  %


 


Band Neutrophils %     (0-10)  %


 


Lymphocytes % (Manual)     (20-40)  %


 


Atypical Lymphs %     %


 


Monocytes % (Manual)     (2-10)  %


 


Eosinophils % (Manual)     (0.8-7.0)  %


 


Basophils % (Manual)     (0.2-1.2)  


 


Manual Slide Review    Abnormal smear  


 


Platelet Estimate     


 


RBC Morph Comment     


 


Sodium     (136-145)  mEq/L


 


Potassium     (3.5-5.1)  mEq/L


 


Chloride     ()  mEq/L


 


Carbon Dioxide     (21-32)  mEq/L


 


Anion Gap     (5-15)  


 


BUN     (7-18)  mg/dL


 


Creatinine     (0.7-1.3)  mg/dL


 


Est Cr Clr Drug Dosing     mL/min


 


Estimated GFR (MDRD)     (>60)  mL/min


 


BUN/Creatinine Ratio     (14-18)  


 


Glucose     ()  mg/dL


 


POC Glucose   246 H   ()  mg/dL


 


Calcium     (8.5-10.1)  mg/dL


 


Magnesium     (1.8-2.4)  mg/dl


 


Total Bilirubin     (0.2-1.0)  mg/dL


 


AST     (15-37)  U/L


 


ALT     (16-63)  U/L


 


Alkaline Phosphatase     ()  U/L


 


Troponin I     (0.00-0.056)  ng/mL


 


C-Reactive Protein     (<1.0)  mg/dL


 


NT-Pro-B Natriuret Pep     (0-450)  pg/mL


 


Total Protein     (6.4-8.2)  g/dl


 


Albumin     (3.4-5.0)  g/dl


 


Globulin     gm/dL


 


Albumin/Globulin Ratio     (1-2)  


 


Mycoplasma pneumon IgM     (NEGATIVE)  


 


MRSA (PCR)  Negative    














  12/26/18 12/26/18 Range/Units





  05:28 11:26 


 


WBC    (4.23-9.07)  K/mm3


 


RBC    (4.63-6.08)  M/mm3


 


Hgb    (13.7-17.5)  gm/L


 


Hct    (40.1-51.0)  %


 


MCV    (79.0-92.2)  fl


 


MCH    (25.7-32.2)  pg


 


MCHC    (32.2-35.5)  g/dl


 


RDW Std Deviation    (35.1-43.9)  fL


 


Plt Count    (163-337)  K/mm3


 


MPV    (9.4-12.3)  fl


 


Neut % (Auto)    (34.0-67.9)  %


 


Lymph % (Auto)    (21.8-53.1)  %


 


Mono % (Auto)    (5.3-12.2)  %


 


Eos % (Auto)    (0.8-7.0)  


 


Baso % (Auto)    (0.1-1.2)  %


 


Neut # (Auto)    (1.78-5.38)  K/mm3


 


Lymph # (Auto)    (1.32-3.57)  K/mm3


 


Mono # (Auto)    (0.30-0.82)  K/mm3


 


Eos # (Auto)    (0.04-0.54)  K/mm3


 


Baso # (Auto)    (0.01-0.08)  K/mm3


 


Neutrophils % (Manual)    (40-60)  %


 


Band Neutrophils %    (0-10)  %


 


Lymphocytes % (Manual)    (20-40)  %


 


Atypical Lymphs %    %


 


Monocytes % (Manual)    (2-10)  %


 


Eosinophils % (Manual)    (0.8-7.0)  %


 


Basophils % (Manual)    (0.2-1.2)  


 


Manual Slide Review    


 


Platelet Estimate    


 


RBC Morph Comment    


 


Sodium  138   (136-145)  mEq/L


 


Potassium  4.5   (3.5-5.1)  mEq/L


 


Chloride  103   ()  mEq/L


 


Carbon Dioxide  26   (21-32)  mEq/L


 


Anion Gap  13.5   (5-15)  


 


BUN  31 H   (7-18)  mg/dL


 


Creatinine  2.1 H   (0.7-1.3)  mg/dL


 


Est Cr Clr Drug Dosing  25.74   mL/min


 


Estimated GFR (MDRD)  31   (>60)  mL/min


 


BUN/Creatinine Ratio  14.8   (14-18)  


 


Glucose  261 H  395 H  ()  mg/dL


 


POC Glucose    ()  mg/dL


 


Calcium  8.9   (8.5-10.1)  mg/dL


 


Magnesium  2.0   (1.8-2.4)  mg/dl


 


Total Bilirubin    (0.2-1.0)  mg/dL


 


AST    (15-37)  U/L


 


ALT    (16-63)  U/L


 


Alkaline Phosphatase    ()  U/L


 


Troponin I    (0.00-0.056)  ng/mL


 


C-Reactive Protein  1.4 H*   (<1.0)  mg/dL


 


NT-Pro-B Natriuret Pep    (0-450)  pg/mL


 


Total Protein    (6.4-8.2)  g/dl


 


Albumin    (3.4-5.0)  g/dl


 


Globulin    gm/dL


 


Albumin/Globulin Ratio    (1-2)  


 


Mycoplasma pneumon IgM    (NEGATIVE)  


 


MRSA (PCR)    











Med Orders - Current: 


 Current Medications





Acetaminophen (Tylenol)  650 mg PO Q4H PRN


   PRN Reason: Pain/Fever


Hydrocodone Bitart/Acetaminophen (Norco 325-5 Mg)  1 tab PO Q4H PRN


   PRN Reason: Pain (moderate 4-6)


Albuterol/Ipratropium (Duoneb 3.0-0.5 Mg/3 Ml)  3 ml INH QID [facility]


   Last Admin: 12/26/18 12:51 Dose:  3 ml


Alogliptin Benzoate (Alogliptin)  25 mg PO DAILY [facility]


   Last Admin: 12/26/18 09:22 Dose:  25 mg


Aspirin (Halfprin)  81 mg PO DAILY [facility]


   Last Admin: 12/26/18 09:23 Dose:  81 mg


Azithromycin (Zithromax)  250 mg PO DAILY [facility]


   Last Admin: 12/26/18 09:24 Dose:  250 mg


Bisacodyl (Dulcolax)  5 mg PO DAILY PRN


   PRN Reason: Constipation


Bumetanide (Bumex)  0.5 mg PO DAILY [facility]


Docusate Sodium (Colace)  100 mg PO BID PRN


   PRN Reason: Constipation


Ferrous Sulfate (Ferrous Sulfate)  325 mg PO DAILY [facility]


   Last Admin: 12/26/18 09:23 Dose:  325 mg


Flunisolide (Nasalide Nasal Spray)  0 ml NASBOTH Q12H [facility]


   Last Admin: 12/26/18 09:26 Dose:  2 sprays


Glipizide (Glucotrol Xl)  5 mg PO BID [facility]


Guaifenesin/Phenylephrine HCl (Robitussin Dm)  5 ml PO Q4H PRN


   PRN Reason: Cough


Guaifenesin/Phenylephrine HCl (Robitussin Dm)  10 ml PO TID@0800,1400,2100 [facility]


   Last Admin: 12/26/18 13:36 Dose:  10 ml


Promethazine HCl 6.25 mg/ (Sodium Chloride)  50.25 mls @ 100 mls/hr IV Q6H PRN


   PRN Reason: Nausea/Vomiting


Sodium Chloride (Normal Saline)  1,000 mls @ 50 mls/hr IV ASDIRECTED [facility]


   Last Admin: 12/26/18 09:20 Dose:  50 mls/hr


Insulin Human Lispro (Humalog)  0 unit SUBCUT QIDACANDBED [facility]; Protocol


   Last Admin: 12/26/18 12:04 Dose:  10 units


Lorazepam (Ativan)  0.25 mg IV Q6H PRN


   PRN Reason: Anxiety


Methylprednisolone Sodium Succinate (Solu-Medrol)  60 mg IVPUSH Q8H [facility]


   Last Admin: 12/26/18 13:36 Dose:  60 mg


Metoprolol Succinate (Toprol Xl)  100 mg PO QAM [facility]


   Last Admin: 12/26/18 09:24 Dose:  100 mg


Miscellaneous Information (Remove Patch)  1 ea TRDERM DAILY [facility]


   Last Admin: 12/26/18 10:53 Dose:  Not Given


Morphine Sulfate (Morphine)  0.5 mg IVPUSH Q4H PRN


   PRN Reason: Dyspnea/SOB/CP


   Stop: 12/30/18 17:33


Nicotine (Habitrol)  21 mg TRDERM DAILY PRN


   PRN Reason: Nicotine Dependence


Ondansetron HCl (Zofran)  4 mg IV Q6H PRN


   PRN Reason: Nausea/Vomiting


Ondansetron HCl (Zofran Odt)  4 mg PO BEDTIME [facility]


Pantoprazole Sodium (Protonix***)  40 mg PO DAILY [facility]


   Last Admin: 12/26/18 09:23 Dose:  40 mg


Polyethylene Glycol (Miralax)  17 gm PO DAILY PRN


   PRN Reason: Constipation


Rivaroxaban (Xarelto)  15 mg PO QPM [facility]


Rosuvastatin Calcium (Crestor)  10 mg PO BEDTIME [facility]


   Last Admin: 12/25/18 20:37 Dose:  10 mg


Senna/Docusate Sodium (Senna Plus)  1 tab PO BID PRN


   PRN Reason: Constipation


Sertraline HCl (Zoloft)  25 mg PO BEDTIME [facility]


   Last Admin: 12/25/18 20:37 Dose:  25 mg


Spironolactone (Aldactone)  25 mg PO QAM [facility]


   Last Admin: 12/26/18 09:23 Dose:  25 mg


Tamsulosin HCl (Flomax)  0.4 mg PO QPM [facility]


   Last Admin: 12/25/18 19:04 Dose:  0.4 mg


Temazepam (Restoril)  7.5 mg PO BEDTIME PRN


   PRN Reason: Sleep


   Last Admin: 12/25/18 20:51 Dose:  7.5 mg


Tramadol HCl (Ultram)  50 mg PO QID PRN


   PRN Reason: Pain





Discontinued Medications





Albuterol (Proventil Neb Soln)  2.5 mg NEB ONETIME ONE


   Stop: 12/25/18 16:03


   Last Admin: 12/25/18 16:33 Dose:  2.5 mg


Albuterol/Ipratropium (Duoneb 3.0-0.5 Mg/3 Ml)  3 ml NEB ONETIME ONE


   Stop: 12/25/18 14:44


   Last Admin: 12/25/18 15:02 Dose:  3 ml


Bumetanide (Bumex)  0.5 mg PO DAILY [facility]


Furosemide (Lasix)  20 mg IVPUSH ONETIME ONE


   Stop: 12/25/18 16:39


   Last Admin: 12/25/18 16:46 Dose:  20 mg


Guaifenesin/Phenylephrine HCl (Robitussin Dm)  10 ml PO TID@0700,1400,2100 [facility]


   Last Admin: 12/25/18 20:38 Dose:  10 ml


Azithromycin 500 mg/ Sodium (Chloride)  250 mls @ 250 mls/hr IV ONETIME ONE


   Stop: 12/25/18 18:38


   Last Admin: 12/25/18 19:03 Dose:  250 mls/hr


Sodium Chloride (Normal Saline)  500 mls @ 999 mls/hr IV .BOLUS ONE


   Stop: 12/25/18 18:19


   Last Admin: 12/25/18 18:56 Dose:  999 mls/hr


Methylprednisolone Sodium Succinate (Solu-Medrol)  60 mg IVPUSH Q8H [facility]


Ondansetron HCl (Zofran Odt)  4 mg PO Q6H [facility]


   Last Admin: 12/26/18 14:21 Dose:  Not Given


Prednisone (Prednisone)  20 mg PO ONETIME ONE


   Stop: 12/25/18 16:39


   Last Admin: 12/25/18 16:46 Dose:  20 mg


Rivaroxaban (Xarelto)  20 mg PO QPM [facility]


   Last Admin: 12/25/18 19:03 Dose:  20 mg











- Exam


Quality Assessment: DVT Prophylaxis


General: Alert, Oriented, Cooperative, Mild Distress


HEENT: Pupils Equal, Pupils Reactive, EOMI, Mucous Membr. Moist/[place], Other ([place]

)


Neck: Supple


Lungs: Normal Respiratory Effort, Rhonchi, Wheezing


Cardiovascular: Regular Rate, Irregular Rhythm


GI/Abdominal Exam: Normal Bowel Sounds, Soft, Non-Tender, No Organomegaly, No 

Distention, No Abnormal Bruit, No Mass, Pelvis Stable


 (Male) Exam: Deferred


Back Exam: Normal Inspection


Extremities: Normal Inspection, Normal Range of Motion, Non-Tender, No Pedal 

Edema, Normal Capillary Refill


Peripheral Pulses: 2+: Posterior Tibial (L), Posterior Tibial (R), Dorsalis 

Pedis (L), Dorsalis Pedis (R)


Skin: Warm, Dry, Intact


Neurological: No New Focal Deficit


Psy/Mental Status: Alert, Normal Affect, Normal Mood





- Problem List & Annotations


(1) COPD exacerbation


SNOMED Code(s): 206764187


   Code(s): J44.1 - CHRONIC OBSTRUCTIVE PULMONARY DISEASE W (ACUTE) 

EXACERBATION   Status: Acute   Priority: High   Current Visit: Yes   





(2) MEGAN (acute kidney injury)


SNOMED Code(s): 28005002


   Code(s): N17.9 - ACUTE KIDNEY FAILURE, UNSPECIFIED   Status: Acute   Priority

: High   Current Visit: Yes   





(3) Elevated brain natriuretic peptide (BNP) level


SNOMED Code(s): 861526780, 658059256


   Code(s): R79.89 - OTHER SPECIFIED ABNORMAL FINDINGS OF BLOOD CHEMISTRY   

Status: Acute   Priority: High   Current Visit: Yes   





- Problem List Review


Problem List Initiated/Reviewed/Updated: Yes





- Plan


Plan:: 


Assessment/Plan:


Acute:


COPD Exacerbation, Improving


   - Unknown severity


   - Worsening Dyspnea with auditory wheezing despite multiple nebs treatment 

since 12/23/2018


   - IV Steroids, Bronchodilators, and Supplemental O2


   - Advised to quit smoking





MEGAN, Improving


   - Baseline GFR is 45 and up


   - GFR 28--> 31


   - Cr 2.3--> 2.1, BUN 24--> 31


   - This is likely poor fluid intake


   - He is on low dose Bumex; will hold AM dose


   - IV fluids and monitor renal function





Elevated ProBNP Level


   - Carries a hx/o Heart Failure with Preserved EF 


   - However he is not volume overloaded


   - No clinical signs of pulmonary congestion and peripheral edema


   - BNP 3745


   - He is on low dose Bumex


   - 2D echo in AM





Tobacco Dependence


   -0.2 pack every day x 60 years


   -Smoking Cessation counseling given


   -Nicotine Patch





Chronic:


Impaired Vision/Hearing


Atrial Fibrillation on Xarelto, HR controlled with BB


HTN


HLD


COPD, Unknown Severity; 2L Supplemental O2 at HS


GERD w/ Esophagitis


AISLINN


BPH S/p TURP


Pancreatic Cyst


Hx/o Bladder CA


Back Pain


Migraine HAs


Nicotine Dependence


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


DM2


Depression 


Polyneuropathy





Plan:


Admit to ICU


Routine AM Labs


Resume Home Meds


RT consult 


2D echo in AM 


Heart Healthy Diet; Consistent Carb 


Fall Precaution


Accu-check QID and ISS Medium level


DVT PPx: Xarelto 


SW/CM for d/c planning


Additional orders as above


Code status: 1; PCP: Dr. Herman

## 2018-12-27 RX ADMIN — GUAIFENESIN AND DEXTROMETHORPHAN SCH ML: 100; 10 SYRUP ORAL at 20:37

## 2018-12-27 RX ADMIN — INSULIN LISPRO SCH UNITS: 100 INJECTION, SOLUTION INTRAVENOUS; SUBCUTANEOUS at 12:08

## 2018-12-27 RX ADMIN — GLIPIZIDE SCH MG: 5 TABLET, FILM COATED, EXTENDED RELEASE ORAL at 08:18

## 2018-12-27 RX ADMIN — GUAIFENESIN AND DEXTROMETHORPHAN SCH ML: 100; 10 SYRUP ORAL at 08:16

## 2018-12-27 RX ADMIN — GUAIFENESIN AND DEXTROMETHORPHAN SCH ML: 100; 10 SYRUP ORAL at 13:21

## 2018-12-27 RX ADMIN — SODIUM CHLORIDE SCH MG: 0.9 INJECTION, SOLUTION INTRAVENOUS at 05:16

## 2018-12-27 RX ADMIN — ONDANSETRON SCH MG: 4 TABLET, ORALLY DISINTEGRATING ORAL at 20:37

## 2018-12-27 RX ADMIN — INSULIN LISPRO SCH UNITS: 100 INJECTION, SOLUTION INTRAVENOUS; SUBCUTANEOUS at 17:32

## 2018-12-27 RX ADMIN — GLIPIZIDE SCH MG: 5 TABLET, FILM COATED, EXTENDED RELEASE ORAL at 20:38

## 2018-12-27 RX ADMIN — INSULIN LISPRO SCH UNITS: 100 INJECTION, SOLUTION INTRAVENOUS; SUBCUTANEOUS at 20:39

## 2018-12-27 RX ADMIN — INSULIN LISPRO SCH UNITS: 100 INJECTION, SOLUTION INTRAVENOUS; SUBCUTANEOUS at 07:23

## 2018-12-27 NOTE — PCM.PN
- General Info


Date of Service: 12/27/18


Admission Dx/Problem (Free Text): 


 Admission Diagnosis/Problem





Admission Diagnosis/Problem      Exacerbation of chronic obstructive pulmonary


                                 disease associated with volcanic smog exposure








Subjective Update: 


Follow Up


 


Functional Status: Reports: Pain Controlled, Tolerating Diet, Ambulating, 

Urinating.  Denies: New Symptoms





- Review of Systems


General: Denies: Fever, Weakness, Fatigue, Malaise, Chills


HEENT: Reports: No Symptoms


Pulmonary: Denies: Shortness of Breath, Cough, Sputum, Wheezing


Cardiovascular: Denies: Chest Pain, Palpitations, Dyspnea on Exertion


Gastrointestinal: Denies: Abdominal Pain, Decreased Appetite, Nausea, Vomiting


Genitourinary: Reports: No Symptoms


Musculoskeletal: Reports: No Symptoms


Skin: Denies: Mottled, Pallor, Diaphoresis, Bruising


Neurological: Denies: Confusion, Difficulty Walking, Weakness, Gait Disturbance


Psychiatric: Denies: Depression, Anxiety, Agitation, Hallucinations


Systems Review Comment:: 


No overnight or acute issues. He feels good this AM and has no complaints. He 

looks comfortable and in no respiratory distress. His WCB and glucose are 

considerably elevated. 








- Patient Data


Vitals - Most Recent: 


 Last Vital Signs











Temp  36.4 C   12/27/18 07:00


 


Pulse  93   12/27/18 08:17


 


Resp  18   12/27/18 07:00


 


BP  145/81 H  12/27/18 08:17


 


Pulse Ox  97   12/27/18 09:05











Weight - Most Recent: 66.315 kg


I&O - Last 24 Hours: 


 Intake & Output











 12/26/18 12/27/18 12/27/18





 22:59 06:59 14:59


 


Intake Total 1510 825 360


 


Balance 1510 825 360











Lab Results Last 24 Hours: 


 Laboratory Results - last 24 hr











  12/25/18 12/26/18 12/26/18 Range/Units





  17:40 05:28 05:56 


 


WBC     (4.23-9.07)  K/mm3


 


RBC     (4.63-6.08)  M/mm3


 


Hgb     (13.7-17.5)  gm/L


 


Hct     (40.1-51.0)  %


 


MCV     (79.0-92.2)  fl


 


MCH     (25.7-32.2)  pg


 


MCHC     (32.2-35.5)  g/dl


 


RDW Std Deviation     (35.1-43.9)  fL


 


Plt Count     (163-337)  K/mm3


 


MPV     (9.4-12.3)  fl


 


Neut % (Auto)     (34.0-67.9)  %


 


Lymph % (Auto)     (21.8-53.1)  %


 


Mono % (Auto)     (5.3-12.2)  %


 


Eos % (Auto)     (0.8-7.0)  


 


Baso % (Auto)     (0.1-1.2)  %


 


Neut # (Auto)     (1.78-5.38)  K/mm3


 


Lymph # (Auto)     (1.32-3.57)  K/mm3


 


Mono # (Auto)     (0.30-0.82)  K/mm3


 


Eos # (Auto)     (0.04-0.54)  K/mm3


 


Baso # (Auto)     (0.01-0.08)  K/mm3


 


Manual Slide Review     


 


Sodium     (136-145)  mEq/L


 


Potassium     (3.5-5.1)  mEq/L


 


Chloride     ()  mEq/L


 


Carbon Dioxide     (21-32)  mEq/L


 


Anion Gap     (5-15)  


 


BUN     (7-18)  mg/dL


 


Creatinine     (0.7-1.3)  mg/dL


 


Est Cr Clr Drug Dosing     mL/min


 


Estimated GFR (MDRD)     (>60)  mL/min


 


BUN/Creatinine Ratio     (14-18)  


 


Glucose     ()  mg/dL


 


POC Glucose    237 H  ()  mg/dL


 


Calcium     (8.5-10.1)  mg/dL


 


Magnesium     (1.8-2.4)  mg/dl


 


C-Reactive Protein     (<1.0)  mg/dL


 


NT-Pro-B Natriuret Pep   4373 H   (0-450)  pg/mL


 


Adenovirus (PCR)  Not detected    (Not Detected)  


 


B. pertussis DNA (PCR)  Not detected    (Not Detected)  


 


B.parapertussis DNA PCR  Not detected    (Not Detected)  


 


C. pneumoniae DNA (PCR)  Not detected    (Not Detected)  


 


Coronavirus (PCR)  Not detected    (Not Detected)  


 


Human Metapneumovir PCR  Not detected    (Not Detected)  


 


Influenza A (RT-PCR)  Not detected    (Not Detected)  


 


Influenza B (RT-PCR)  Not detected    (Not Detected)  


 


M. pneumoniae (PCR)  Not detected    (Not Detected)  


 


Parainfluen 1,2,3,4 PCR  Not detected    (Not Detected)  


 


RSV (PCR)  Not detected    (Not Detected)  


 


Entero/Rhino (PCR)  Detected H    (Not Detected)  














  12/26/18 12/26/18 12/27/18 Range/Units





  16:47 20:40 05:33 


 


WBC    13.65 H  (4.23-9.07)  K/mm3


 


RBC    3.17 L  (4.63-6.08)  M/mm3


 


Hgb    9.8 L  (13.7-17.5)  gm/L


 


Hct    29.8 L  (40.1-51.0)  %


 


MCV    94.0 H  (79.0-92.2)  fl


 


MCH    30.9  (25.7-32.2)  pg


 


MCHC    32.9  (32.2-35.5)  g/dl


 


RDW Std Deviation    51.2 H  (35.1-43.9)  fL


 


Plt Count    251  (163-337)  K/mm3


 


MPV    9.0 L  (9.4-12.3)  fl


 


Neut % (Auto)    93.1 H  (34.0-67.9)  %


 


Lymph % (Auto)    4.4 L  (21.8-53.1)  %


 


Mono % (Auto)    2.3 L  (5.3-12.2)  %


 


Eos % (Auto)    0 L  (0.8-7.0)  


 


Baso % (Auto)    0.0 L  (0.1-1.2)  %


 


Neut # (Auto)    12.70 H  (1.78-5.38)  K/mm3


 


Lymph # (Auto)    0.60 L  (1.32-3.57)  K/mm3


 


Mono # (Auto)    0.32  (0.30-0.82)  K/mm3


 


Eos # (Auto)    0.00 L  (0.04-0.54)  K/mm3


 


Baso # (Auto)    0.00 L  (0.01-0.08)  K/mm3


 


Manual Slide Review    Abnormal smear  


 


Sodium     (136-145)  mEq/L


 


Potassium     (3.5-5.1)  mEq/L


 


Chloride     ()  mEq/L


 


Carbon Dioxide     (21-32)  mEq/L


 


Anion Gap     (5-15)  


 


BUN     (7-18)  mg/dL


 


Creatinine     (0.7-1.3)  mg/dL


 


Est Cr Clr Drug Dosing     mL/min


 


Estimated GFR (MDRD)     (>60)  mL/min


 


BUN/Creatinine Ratio     (14-18)  


 


Glucose     ()  mg/dL


 


POC Glucose  282 H  200 H   ()  mg/dL


 


Calcium     (8.5-10.1)  mg/dL


 


Magnesium     (1.8-2.4)  mg/dl


 


C-Reactive Protein     (<1.0)  mg/dL


 


NT-Pro-B Natriuret Pep     (0-450)  pg/mL


 


Adenovirus (PCR)     (Not Detected)  


 


B. pertussis DNA (PCR)     (Not Detected)  


 


B.parapertussis DNA PCR     (Not Detected)  


 


C. pneumoniae DNA (PCR)     (Not Detected)  


 


Coronavirus (PCR)     (Not Detected)  


 


Human Metapneumovir PCR     (Not Detected)  


 


Influenza A (RT-PCR)     (Not Detected)  


 


Influenza B (RT-PCR)     (Not Detected)  


 


M. pneumoniae (PCR)     (Not Detected)  


 


Parainfluen 1,2,3,4 PCR     (Not Detected)  


 


RSV (PCR)     (Not Detected)  


 


Entero/Rhino (PCR)     (Not Detected)  














  12/27/18 12/27/18 12/27/18 Range/Units





  05:33 05:33 11:41 


 


WBC     (4.23-9.07)  K/mm3


 


RBC     (4.63-6.08)  M/mm3


 


Hgb     (13.7-17.5)  gm/L


 


Hct     (40.1-51.0)  %


 


MCV     (79.0-92.2)  fl


 


MCH     (25.7-32.2)  pg


 


MCHC     (32.2-35.5)  g/dl


 


RDW Std Deviation     (35.1-43.9)  fL


 


Plt Count     (163-337)  K/mm3


 


MPV     (9.4-12.3)  fl


 


Neut % (Auto)     (34.0-67.9)  %


 


Lymph % (Auto)     (21.8-53.1)  %


 


Mono % (Auto)     (5.3-12.2)  %


 


Eos % (Auto)     (0.8-7.0)  


 


Baso % (Auto)     (0.1-1.2)  %


 


Neut # (Auto)     (1.78-5.38)  K/mm3


 


Lymph # (Auto)     (1.32-3.57)  K/mm3


 


Mono # (Auto)     (0.30-0.82)  K/mm3


 


Eos # (Auto)     (0.04-0.54)  K/mm3


 


Baso # (Auto)     (0.01-0.08)  K/mm3


 


Manual Slide Review     


 


Sodium  138    (136-145)  mEq/L


 


Potassium  4.8    (3.5-5.1)  mEq/L


 


Chloride  104    ()  mEq/L


 


Carbon Dioxide  23    (21-32)  mEq/L


 


Anion Gap  15.8 H    (5-15)  


 


BUN  47 H    (7-18)  mg/dL


 


Creatinine  2.2 H    (0.7-1.3)  mg/dL


 


Est Cr Clr Drug Dosing  24.57    mL/min


 


Estimated GFR (MDRD)  29    (>60)  mL/min


 


BUN/Creatinine Ratio  21.4 H    (14-18)  


 


Glucose  250 H    ()  mg/dL


 


POC Glucose    294 H  ()  mg/dL


 


Calcium  9.4    (8.5-10.1)  mg/dL


 


Magnesium  2.1    (1.8-2.4)  mg/dl


 


C-Reactive Protein  0.7    (<1.0)  mg/dL


 


NT-Pro-B Natriuret Pep   6896 H   (0-450)  pg/mL


 


Adenovirus (PCR)     (Not Detected)  


 


B. pertussis DNA (PCR)     (Not Detected)  


 


B.parapertussis DNA PCR     (Not Detected)  


 


C. pneumoniae DNA (PCR)     (Not Detected)  


 


Coronavirus (PCR)     (Not Detected)  


 


Human Metapneumovir PCR     (Not Detected)  


 


Influenza A (RT-PCR)     (Not Detected)  


 


Influenza B (RT-PCR)     (Not Detected)  


 


M. pneumoniae (PCR)     (Not Detected)  


 


Parainfluen 1,2,3,4 PCR     (Not Detected)  


 


RSV (PCR)     (Not Detected)  


 


Entero/Rhino (PCR)     (Not Detected)  











Med Orders - Current: 


 Current Medications





Acetaminophen (Tylenol)  650 mg PO Q4H PRN


   PRN Reason: Pain/Fever


Hydrocodone Bitart/Acetaminophen (Norco 325-5 Mg)  1 tab PO Q4H PRN


   PRN Reason: Pain (moderate 4-6)


Albuterol/Ipratropium (Duoneb 3.0-0.5 Mg/3 Ml)  3 ml INH QIDRT [facility]


   Last Admin: 12/27/18 09:04 Dose:  3 ml


Alogliptin Benzoate (Alogliptin)  25 mg PO DAILY [facility]


   Last Admin: 12/27/18 08:17 Dose:  25 mg


Aspirin (Halfprin)  81 mg PO DAILY [facility]


   Last Admin: 12/27/18 08:17 Dose:  81 mg


Azithromycin (Zithromax)  250 mg PO DAILY [facility]


   Last Admin: 12/27/18 08:17 Dose:  250 mg


Bisacodyl (Dulcolax)  5 mg PO DAILY PRN


   PRN Reason: Constipation


Bumetanide (Bumex)  0.5 mg PO DAILY [facility]


Docusate Sodium (Colace)  100 mg PO BID PRN


   PRN Reason: Constipation


Ferrous Sulfate (Ferrous Sulfate)  325 mg PO DAILY [facility]


   Last Admin: 12/27/18 08:18 Dose:  325 mg


Flunisolide (Nasalide Nasal Spray)  0 ml NASBOTH Q12H [facility]


   Last Admin: 12/27/18 08:16 Dose:  1 sprays


Glipizide (Glucotrol Xl)  5 mg PO BID [facility]


   Last Admin: 12/27/18 08:18 Dose:  5 mg


Guaifenesin/Phenylephrine HCl (Robitussin Dm)  5 ml PO Q4H PRN


   PRN Reason: Cough


Guaifenesin/Phenylephrine HCl (Robitussin Dm)  10 ml PO TID@0800,1400,2100 [facility]


   Last Admin: 12/27/18 08:16 Dose:  10 ml


Promethazine HCl 6.25 mg/ (Sodium Chloride)  50.25 mls @ 100 mls/hr IV Q6H PRN


   PRN Reason: Nausea/Vomiting


Sodium Chloride (Normal Saline)  1,000 mls @ 50 mls/hr IV ASDIRECTED [facility]


   Last Admin: 12/27/18 08:44 Dose:  50 mls/hr


Insulin Human Lispro (Humalog)  0 unit SUBCUT QIDACANDBED [facility]; Protocol


   Last Admin: 12/27/18 12:08 Dose:  9 units


Lorazepam (Ativan)  0.25 mg IV Q6H PRN


   PRN Reason: Anxiety


Metoprolol Succinate (Toprol Xl)  100 mg PO QAM [facility]


   Last Admin: 12/27/18 08:17 Dose:  100 mg


Miscellaneous Information (Remove Patch)  1 ea TRDERM DAILY [facility]


   Last Admin: 12/27/18 08:19 Dose:  Not Given


Morphine Sulfate (Morphine)  0.5 mg IVPUSH Q4H PRN


   PRN Reason: Dyspnea/SOB/CP


   Stop: 12/30/18 17:33


Nicotine (Habitrol)  21 mg TRDERM DAILY PRN


   PRN Reason: Nicotine Dependence


Ondansetron HCl (Zofran)  4 mg IV Q6H PRN


   PRN Reason: Nausea/Vomiting


Ondansetron HCl (Zofran Odt)  4 mg PO BEDTIME [facility]


   Last Admin: 12/26/18 20:12 Dose:  4 mg


Pantoprazole Sodium (Protonix***)  40 mg PO DAILY [facility]


   Last Admin: 12/27/18 08:18 Dose:  40 mg


Polyethylene Glycol (Miralax)  17 gm PO DAILY PRN


   PRN Reason: Constipation


Rivaroxaban (Xarelto)  15 mg PO QPM [facility]


   Last Admin: 12/26/18 17:42 Dose:  15 mg


Rosuvastatin Calcium (Crestor)  10 mg PO BEDTIME [facility]


   Last Admin: 12/26/18 20:12 Dose:  10 mg


Senna/Docusate Sodium (Senna Plus)  1 tab PO BID PRN


   PRN Reason: Constipation


Sertraline HCl (Zoloft)  25 mg PO BEDTIME [facility]


   Last Admin: 12/26/18 20:12 Dose:  25 mg


Spironolactone (Aldactone)  25 mg PO QAM [facility]


   Last Admin: 12/27/18 08:17 Dose:  25 mg


Tamsulosin HCl (Flomax)  0.4 mg PO QPM [facility]


   Last Admin: 12/26/18 17:42 Dose:  0.4 mg


Temazepam (Restoril)  7.5 mg PO BEDTIME PRN


   PRN Reason: Sleep


   Last Admin: 12/25/18 20:51 Dose:  7.5 mg


Tramadol HCl (Ultram)  50 mg PO QID PRN


   PRN Reason: Pain





Discontinued Medications





Albuterol (Proventil Neb Soln)  2.5 mg NEB ONETIME ONE


   Stop: 12/25/18 16:03


   Last Admin: 12/25/18 16:33 Dose:  2.5 mg


Albuterol/Ipratropium (Duoneb 3.0-0.5 Mg/3 Ml)  3 ml NEB ONETIME ONE


   Stop: 12/25/18 14:44


   Last Admin: 12/25/18 15:02 Dose:  3 ml


Albuterol/Ipratropium (Duoneb 3.0-0.5 Mg/3 Ml)  3 ml INH QID [facility]


   Last Admin: 12/26/18 16:57 Dose:  3 ml


Bumetanide (Bumex)  0.5 mg PO DAILY [facility]


Furosemide (Lasix)  20 mg IVPUSH ONETIME ONE


   Stop: 12/25/18 16:39


   Last Admin: 12/25/18 16:46 Dose:  20 mg


Guaifenesin/Phenylephrine HCl (Robitussin Dm)  10 ml PO TID@0700,1400,2100 [facility]


   Last Admin: 12/25/18 20:38 Dose:  10 ml


Azithromycin 500 mg/ Sodium (Chloride)  250 mls @ 250 mls/hr IV ONETIME ONE


   Stop: 12/25/18 18:38


   Last Admin: 12/25/18 19:03 Dose:  250 mls/hr


Sodium Chloride (Normal Saline)  500 mls @ 999 mls/hr IV .BOLUS ONE


   Stop: 12/25/18 18:19


   Last Admin: 12/25/18 18:56 Dose:  999 mls/hr


Methylprednisolone Sodium Succinate (Solu-Medrol)  60 mg IVPUSH Q8H [facility]


Methylprednisolone Sodium Succinate (Solu-Medrol)  60 mg IVPUSH Q8H [facility]


   Last Admin: 12/27/18 05:16 Dose:  60 mg


Methylprednisolone Sodium Succinate (Solu-Medrol)  60 mg IVPUSH Q12H [facility]


Methylprednisolone Sodium Succinate (Solu-Medrol)  40 mg IVPUSH Q12H [facility]


Ondansetron HCl (Zofran Odt)  4 mg PO Q6H [facility]


   Last Admin: 12/26/18 14:21 Dose:  Not Given


Prednisone (Prednisone)  20 mg PO ONETIME ONE


   Stop: 12/25/18 16:39


   Last Admin: 12/25/18 16:46 Dose:  20 mg


Rivaroxaban (Xarelto)  20 mg PO QPM [facility]


   Last Admin: 12/25/18 19:03 Dose:  20 mg











- Exam


General: Alert, Cooperative, No Acute Distress


HEENT: Pupils Equal, Pupils Reactive, Mucous Membr. Moist/Pink, Other (Very 

hard of hearing)


Neck: Supple


Lungs: Clear to Auscultation, Normal Respiratory Effort, Decreased Breath Sounds


Cardiovascular: Regular Rate, Regular Rhythm


GI/Abdominal Exam: Normal Bowel Sounds, Soft, Non-Tender, No Organomegaly, No 

Distention, No Abnormal Bruit


 (Male) Exam: Deferred


Back Exam: Normal Inspection, Decreased Range of Motion


Extremities: Normal Inspection, Normal Range of Motion, Non-Tender, No Pedal 

Edema, Normal Capillary Refill


Peripheral Pulses: 2+: Dorsalis Pedis (L), Dorsalis Pedis (R)


Skin: Warm, Dry, Intact


Neurological: No New Focal Deficit


Psy/Mental Status: Alert, Normal Affect, Normal Mood





- Problem List Review


Problem List Initiated/Reviewed/Updated: Yes





- My Orders


Last 24 Hours: 


My Active Orders





12/26/18 18:00


Rivaroxaban [Xarelto]   15 mg PO QPM 





12/26/18 21:00


Albuterol/Ipratropium [DuoNeb 3.0-0.5 MG/3 ML]   3 ml INH QIDRT 


Ondansetron [Zofran ODT]   4 mg PO BEDTIME 


glipiZIDE [Glucotrol XL]   5 mg PO BID 





12/27/18 22:19


Bumetanide [Bumex]   0.5 mg PO DAILY 





12/28/18 05:11


BASIC METABOLIC PANEL,BMP [CHEM] AM 


CBC WITH AUTO DIFF [HEME] AM 


MAGNESIUM [CHEM] AM 





12/29/18 05:11


BASIC METABOLIC PANEL,BMP [CHEM] AM 


CBC WITH AUTO DIFF [HEME] AM 


MAGNESIUM [CHEM] AM 





12/30/18 05:11


BASIC METABOLIC PANEL,BMP [CHEM] AM 


CBC WITH AUTO DIFF [HEME] AM 














- Plan


Plan:: 


Assessment/Plan:


Acute:


COPD Exacerbation, Continue to Improve


   - Unknown severity


   - Worsening Dyspnea with auditory wheezing despite multiple nebs treatment 

since 12/23/2018


   - Continue IV Bronchodilators, and Supplemental O2


   - Discontinue IV Steroids


   - Advised to quit smoking





MEGAN, Worsening


   - Baseline GFR is 45 and up


   - GFR 28--> 31 --> 29


   - Cr 2.3--> 2.1--> 2.2, BUN 24--> 31 --> 47


   - This is likely poor fluid intake


   - Hold Bumex and Aldactone


   - Increase IV fluid rate to 250 cc/hr


   - Continue to monitor renal function





Elevated ProBNP Level


   - Carries a hx/o Heart Failure with Preserved EF 


   - However he is not volume overloaded


   - No clinical signs of pulmonary congestion and peripheral edema


   - BNP 3745 --> 6896


   - He is on low dose Bumex


   - 2D echo completed today 





Hyperglycemia w/ DM2


   - -300s; expected to be elevated


   - A1C 7.2 7/12/2017


   - He is only on Januvia


   - He has been on IV Steroids; will d/c it today since his lungs are clear


   - Continue DDP4, Glipizide and Changed ISS level to High





Tobacco Dependence


   - 0.2 pack every day x 60 years


   - Smoking Cessation counseling given


   - Nicotine Patch





Leukocytosis


   - WBC 13.65


   - 2/2 Steroid use


   - Expected to trend down once he is off steroid





Chronic:


Impaired Vision/Hearing


Atrial Fibrillation on Xarelto, HR controlled with BB


HTN


HLD


COPD, Unknown Severity; 2L Supplemental O2 at HS


GERD w/ Esophagitis


AISLINN


BPH S/p TURP


Pancreatic Cyst


Hx/o Bladder CA


Back Pain


Migraine HAs


Nicotine Dependence


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


DM2


Depression 


Polyneuropathy





Plan:


He is clinically stable


Routine AM Labs


Heart Healthy Diet; Consistent Carb 


Fall Precaution


Accu-check QID and ISS High level


DVT PPx: Xarelto 


SW/CM for d/c planning


Additional orders as above


Encourage to ambulate as tolerated


Code status: 1; PCP: Dr. Herman


Discharge in 1-2 days w/ pending 2D echo report

## 2018-12-27 NOTE — CR
Chest: Portable view of the chest was obtained.

 

Comparison: Prior chest x-ray of 12/25/18.

 

Heart size is slightly enlarged.  Tortuous thoracic aorta is seen.  

Lungs are clear with no acute parenchymal change.  Pulmonary vessels 

are minimally increased which appear stable.  Bony structures are 

osteopenic.  Mild scoliosis is present.

 

Impression:

1.  Stable cardiomegaly and mild stable pulmonary vascular congestion.

2.  Nothing acute is otherwise seen.

 

Diagnostic code #3

## 2018-12-28 VITALS — SYSTOLIC BLOOD PRESSURE: 127 MMHG | DIASTOLIC BLOOD PRESSURE: 71 MMHG

## 2018-12-28 RX ADMIN — INSULIN LISPRO SCH: 100 INJECTION, SOLUTION INTRAVENOUS; SUBCUTANEOUS at 00:47

## 2018-12-28 RX ADMIN — GUAIFENESIN AND DEXTROMETHORPHAN SCH ML: 100; 10 SYRUP ORAL at 13:24

## 2018-12-28 RX ADMIN — GLIPIZIDE SCH MG: 5 TABLET, FILM COATED, EXTENDED RELEASE ORAL at 08:01

## 2018-12-28 RX ADMIN — INSULIN LISPRO SCH: 100 INJECTION, SOLUTION INTRAVENOUS; SUBCUTANEOUS at 11:52

## 2018-12-28 RX ADMIN — GUAIFENESIN AND DEXTROMETHORPHAN SCH ML: 100; 10 SYRUP ORAL at 08:02

## 2018-12-28 RX ADMIN — INSULIN LISPRO SCH: 100 INJECTION, SOLUTION INTRAVENOUS; SUBCUTANEOUS at 06:39

## 2018-12-28 NOTE — [id number]
**Discharge Summary





- Hospital Course


HPI Initial Comments: 





78-year-old male presents for evaluation and treatment of shortness of breath. 

Reportedly he has been more short of breath for the last few weeks with the 

last few days been much worse than normal. He is coughing and reports a 

productive cough. He reports that the dyspnea is much worse on exertion. He 

denies any fevers, chills, nausea, vomiting, chest pain or abdominal pain. He 

has not appreciated any swelling in his legs. Has not appreciated any weight 

gain.





Review the patient's records shows a past medical history of congestive heart 

failure and COPD. He does receive DuoNeb treatments. He is a resident of [facility].


Diagnosis: Stroke: No


Modified Bulger Scale: No Symptoms at All


Modified Astrid Scale Score: 0





- Discharge Data


Discharge Date: 12/28/18 (ADMIT 12/25/18)


Discharge Disposition: Home, Self-Care 01


Condition: Good





- Discharge Diagnosis/Problem(s)


(1) COPD exacerbation


SNOMED Code(s): 393283115


   ICD Code: J44.1 - CHRONIC OBSTRUCTIVE PULMONARY DISEASE W (ACUTE) 

EXACERBATION   Status: Acute   Priority: High   





(2) MEGAN (acute kidney injury)


SNOMED Code(s): 27347859


   ICD Code: N17.9 - ACUTE KIDNEY FAILURE, UNSPECIFIED   Status: Acute   

Priority: High   





(3) Elevated brain natriuretic peptide (BNP) level


SNOMED Code(s): 375881529, 161808786


   ICD Code: R79.89 - OTHER SPECIFIED ABNORMAL FINDINGS OF BLOOD CHEMISTRY   

Status: Acute   Priority: High   





- Patient Summary/Data


Operative Procedure(s) Performed: none


Complications: none


Consults: 


 Consultations





12/25/18 17:34


Consult to Case Management/ [CONS] Routine 


Consult to Spiritual Care [CONS] Routine 


OT Evaluation and Treatment [CONS] Routine 


PT Evaluation and Treatment [CONS] Routine 


Respiratory Care Assess and Treatment [CONS] Routine 











Labs Pending at D/C: 





none


Recommended Follow-up Testing/Procedures: 


Follow up with PCP in 1 week 


Recommend Urology eval for BPH/Urinary Retention 


Planned Operative Procedure(s) after DC: none


Hospital Course: 





Assessment/Plan:


Acute:


COPD Exacerbation, Continue to Improve


   - Unknown severity


   - Worsening Dyspnea with auditory wheezing despite multiple nebs treatment 

since 12/23/2018


   - CXR 12/25/18--> Findings suspicious for minimal pulmonary vascular 

congestion. Mild cardiomegaly is noted. 


   - CXR 12/27/18--> Stable cardiomegaly and mild stable pulmonary vascular 

congestion.


   - CXR 12/28/18--> Cardiomegaly and mild central pulmonary vascular 

congestion which appears stable from prior chest x-ray.


   - Renal U/S with renal arterial Doppler 12/28/18: 


      -1. Significant postvoid residual. 


      -2. Arterial Doppler unsuccessful despite attempts by 2 technologists due 

to patient's breathing. 


      -3. Kidney show no hydronephrosis. 


   - Continue IV Bronchodilators, and Supplemental O2--> See if pt qualifies 

for home O2


   - Discontinue IV Steroids


   - Advised to quit smoking


    


MEGAN, Worsening


   - Baseline GFR is 45 and up


   - GFR 28--> 31 --> 29


   - Cr 2.3--> 2.1--> 2.2, BUN 24--> 31 --> 47--> 53


   - This is likely poor fluid intake


   - Hold Bumex and Aldactone


   - Increase IV fluid rate to 250 cc/hr


   - Continue to monitor renal function





Diastolic HF


   - Carries a hx/o Heart Failure with Preserved EF 


   - However he is not volume overloaded


   - No clinical signs of pulmonary congestion and peripheral edema


   - BNP 3745 --> 6896--> 7546


   - He is on low dose Bumex


   - ECHO 12/27/18:


      -LVEF 55-60%


      -Moderate aortic valve sclerosis w/o stenosis


      -Mild mitral valve regurgitation


      -Mod aortic valve regurgitation


      -Mod to severe tricuspid regurgitation


      -Severe biatrial dilation





Hyperglycemia w/ DM2


   - -300s; expected to be elevated


   - A1C 7.2 7/12/2017


   - He is only on Januvia


   - He has been on IV Steroids; will d/c it today since his lungs are clear


   - Continue DDP4, Glipizide and Changed ISS level to High





Tobacco Dependence


   - 0.2 pack every day x 60 years


   - Smoking Cessation counseling given


   - Nicotine Patch





Leukocytosis, Improving


   - WBC 13.65--> 11.12


   - 2/2 Steroid use


   - Expected to trend down once he is off steroid





Chronic:


Impaired Vision/Hearing


Atrial Fibrillation on Xarelto, HR controlled with BB


HTN


HLD


COPD, Unknown Severity; 2L Supplemental O2 at HS


GERD w/ Esophagitis


AISLINN


BPH S/p TURP


Pancreatic Cyst


Hx/o Bladder CA


Back Pain


Migraine HAs


Nicotine Dependence


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


DM2


Depression 


Polyneuropathy





Plan:


He is clinically stable


Routine AM Labs


Heart Healthy Diet; Consistent Carb 


Fall Precaution


Accu-check QID and ISS High level


DVT PPx: Xarelto 


SW/CM for d/c planning


Additional orders as above


Encourage to ambulate as tolerated


Code status: 1; PCP: Dr. Herman


Discharge in 1-2 days w/ pending 2D echo report


D/C to Located within [facility] today





F/F: Due to recent COPD Exacerbation it is taking increasing effort for pt to 

leave the home. Pt is walker dependent, has decreased level of endurance, 

decreased activity tolerance, unsteady gait, dyspnea with activity, oxygen 

dependent, and will benefit from PT services to increase level of independence/

improve endurance. Discussed homebound status with patient and they agree and 

understand. Lives alone at [facility]. Primary care Physician is 

Dr. Herman.





 


Bennett did OK here after being admitted for COPD exacerbation and CHF 

exacerbation. He is still a smoker, and was advised while here that he needs to 

quit otherwise his condition will continue to worsen. It is most likely that 

the COPD exacerbation caused the diastolic CHF exacerbation. He also had MEGAN 

while here, Bumex and Aldactone were held and IVF were given with no 

improvement. Recommend Urology eval for BPH/Urinary Retention. His A1C was 

found to be 7.2 while here, he needs to f/u with PCP regarding this issue. Pt 

will be discharged with Azithro and prednisone x 3 days for completion of 

treatment, Duoneb, Advair, Metoprolol, Glipizide, Xaralto was changed from 20mg 

to 15mg, and Flomax was changed from 0.4mg to 0.8mg. It is recommended the pt f/

u with PCP in 1 week. Pt to be D/C'd to Located within [facility] with  for PT services 

and O2. Pt qualified for Oxygen prior to discharge today (SpO2 86% off O2 with 

exercise). Portable oxygen concentrator brought to hospital by supplier and 

concentrator will be set up at residence.





- Patient Instructions


Diet: Heart Healthy Diet, Usual Diet as Tolerated


Activity: As Tolerated


Driving: Do Not Drive


Showering/Bathing: May Shower


Notify Provider of: Fever, Increased Pain, Swelling and Redness, Drainage, 

Nausea and/or Vomiting


Other/Special Instructions: - Please take all new medications as directed.  - 

Resume all home medications and routine home activities as tolerated.  - Call 

or follow up with your PCP for any questions or concerns after discharge.  - 

Recommend Urology eval for BPH/Urinary Retention.  - Follow up with your PCP in 

1 week.  - Come back or seek immediate care should your symptoms persist or get 

worse





- Discharge Plan


*PRESCRIPTION DRUG MONITORING PROGRAM REVIEWED*: No


*COPY OF PRESCRIPTION DRUG MONITORING REPORT IN PATIENT CARA: No


Prescriptions/Med Rec: 


Azithromycin [Zithromax] 250 mg PO DAILY #3 tablet


Dextromethorphan/guaiFENesin [Robitussin DM] 5 ml PO Q4H PRN #15 cup


 PRN Reason: Cough


Fluticasone/Salmeterol [Advair 250-50 Diskus] 14 each IH ASDIRECTED #1 

disk.w.dev


glipiZIDE [Glucotrol XL] 5 mg PO BID #8 tab.er


predniSONE [Prednisone] 20 mg PO DAILY #3 tablet


Rivaroxaban [Xarelto] 15 mg PO QPM #30 tablet


Tamsulosin [Flomax] 0.8 mg PO QPM #60 cap.er


Home Medications: 


 Home Meds





Aspirin [Halfprin] 81 mg PO DAILY #30 tab.ec 07/14/17 [Rx]


Ferrous Sulfate 325 mg PO DAILY #30 tablet 07/14/17 [Rx]


Sertraline [Zoloft] 25 mg PO BEDTIME #30 tablet 07/14/17 [Rx]


Acetaminophen [Tylenol] 650 mg PO Q4H PRN 04/15/18 [History]


Bumetanide 0.5 mg PO DAILY 04/15/18 [History]


Fluticasone Propionate [Flonase Allergy Relief] 1 spray NASBOTH DAILY 04/15/18 [

History]


Omeprazole 20 mg PO DAILY 04/15/18 [History]


Ondansetron HCl [Zofran] 4 mg PO Q6H 04/15/18 [History]


Spironolactone [Aldactone] 25 mg PO QAM 04/15/18 [History]


atorvaSTATin [Lipitor] 40 mg PO QPM 04/15/18 [History]


traMADol [Ultram] 50 mg PO QID PRN 04/15/18 [History]


SitaGLIPtin [Januvia] 100 mg PO DAILY 12/25/18 [History]


Albuterol/Ipratropium [DuoNeb 3.0-0.5 MG/3 ML] 3 ml INH ASDIRECTED #1 12/28/18 [

Rx]


Azithromycin [Zithromax] 250 mg PO DAILY #3 tablet 12/28/18 [Rx]


Dextromethorphan/guaiFENesin [Robitussin DM] 5 ml PO Q4H PRN #15 cup 12/28/18 [

Rx]


Fluticasone/Salmeterol [Advair 250-50 Diskus] 14 each IH ASDIRECTED #1 

disk.w.dev 12/28/18 [Rx]


Metoprolol Succinate 100 mg PO QAM #0 12/28/18 [Rx]


Rivaroxaban [Xarelto] 15 mg PO QPM #30 tablet 12/28/18 [Rx]


Tamsulosin [Flomax] 0.8 mg PO QPM #60 cap.er 12/28/18 [Rx]


glipiZIDE [Glucotrol XL] 5 mg PO BID #8 tab.er 12/28/18 [Rx]


predniSONE [Prednisone] 20 mg PO DAILY #3 tablet 12/28/18 [Rx]








Oxygen Therapy Mode: Nasal Cannula


Oxygen Flow Rate (L/min): 1 (with activity)


Patient Handouts:  Chronic Obstructive Pulmonary Disease Exacerbation, Easy-to-

Read, Acute Kidney Injury, Adult, Heart Failure, Easy-to-Read, Steps to Quit 

Smoking


Referrals: 


David Herman MD [Primary Care Provider] - 





- Discharge Summary/Plan Comment


DC Time >30 min.: Yes (40)





- General Info


Date of Service: 12/28/18


Admission Dx/Problem (Free Text: 


 Admission Diagnosis/Problem





Admission Diagnosis/Problem      Exacerbation of chronic obstructive pulmonary


                                 disease associated with volcanic smog exposure








Functional Status: Reports: Pain Controlled, Tolerating Diet, Ambulating (FWW 

and assistance), Urinating, Incentive Spirometry





- Review of Systems


General: Reports: No Symptoms.  Denies: Fever, Weakness, Chills


HEENT: Reports: No Symptoms


Pulmonary: Reports: No Symptoms.  Denies: Shortness of Breath, Cough, Sputum


Cardiovascular: Reports: No Symptoms.  Denies: Chest Pain


Gastrointestinal: Reports: No Symptoms.  Denies: Abdominal Pain, Diarrhea, 

Nausea, Vomiting


Genitourinary: Reports: No Symptoms


Musculoskeletal: Reports: No Symptoms


Skin: Reports: No Symptoms


Neurological: Reports: No Symptoms


Psychiatric: Reports: No Symptoms





- Patient Data


Vitals - Most Recent: 


 Last Vital Signs











Temp  97.3 F   12/28/18 07:53


 


Pulse  87   12/28/18 08:01


 


Resp  28 H  12/28/18 07:51


 


BP  127/71   12/28/18 08:01


 


Pulse Ox  98   12/28/18 13:35











Weight - Most Recent: 147 lb 1.6 oz


I&O - Last 24 hours: 


 Intake & Output











 12/28/18 12/28/18 12/28/18





 06:59 14:59 22:59


 


Intake Total 1100 240 180


 


Balance 1100 240 180











Lab Results - Last 24 hrs: 


 Laboratory Results - last 24 hr











  12/27/18 12/27/18 12/27/18 Range/Units





  17:19 17:55 20:36 


 


WBC     (4.23-9.07)  K/mm3


 


RBC     (4.63-6.08)  M/mm3


 


Hgb     (13.7-17.5)  gm/L


 


Hct     (40.1-51.0)  %


 


MCV     (79.0-92.2)  fl


 


MCH     (25.7-32.2)  pg


 


MCHC     (32.2-35.5)  g/dl


 


RDW Std Deviation     (35.1-43.9)  fL


 


Plt Count     (163-337)  K/mm3


 


MPV     (9.4-12.3)  fl


 


Neut % (Auto)     (34.0-67.9)  %


 


Lymph % (Auto)     (21.8-53.1)  %


 


Mono % (Auto)     (5.3-12.2)  %


 


Eos % (Auto)     (0.8-7.0)  


 


Baso % (Auto)     (0.1-1.2)  %


 


Neut # (Auto)     (1.78-5.38)  K/mm3


 


Lymph # (Auto)     (1.32-3.57)  K/mm3


 


Mono # (Auto)     (0.30-0.82)  K/mm3


 


Eos # (Auto)     (0.04-0.54)  K/mm3


 


Baso # (Auto)     (0.01-0.08)  K/mm3


 


Manual Slide Review     


 


Sodium   137   (136-145)  mEq/L


 


Potassium   4.8   (3.5-5.1)  mEq/L


 


Chloride   104   ()  mEq/L


 


Carbon Dioxide   23   (21-32)  mEq/L


 


Anion Gap   14.8   (5-15)  


 


BUN   49 H   (7-18)  mg/dL


 


Creatinine   2.1 H   (0.7-1.3)  mg/dL


 


Est Cr Clr Drug Dosing   25.74   mL/min


 


Estimated GFR (MDRD)   31   (>60)  mL/min


 


BUN/Creatinine Ratio   23.3 H   (14-18)  


 


Glucose   187 H   ()  mg/dL


 


POC Glucose  174 H   225 H  ()  mg/dL


 


Calcium   9.0   (8.5-10.1)  mg/dL


 


Magnesium   2.0   (1.8-2.4)  mg/dl


 


NT-Pro-B Natriuret Pep     (0-450)  pg/mL














  12/28/18 12/28/18 12/28/18 Range/Units





  05:40 05:40 05:40 


 


WBC  11.12 H    (4.23-9.07)  K/mm3


 


RBC  3.15 L    (4.63-6.08)  M/mm3


 


Hgb  9.5 L    (13.7-17.5)  gm/L


 


Hct  30.0 L    (40.1-51.0)  %


 


MCV  95.2 H    (79.0-92.2)  fl


 


MCH  30.2    (25.7-32.2)  pg


 


MCHC  31.7 L    (32.2-35.5)  g/dl


 


RDW Std Deviation  53.2 H    (35.1-43.9)  fL


 


Plt Count  256    (163-337)  K/mm3


 


MPV  8.9 L    (9.4-12.3)  fl


 


Neut % (Auto)  84.5 H    (34.0-67.9)  %


 


Lymph % (Auto)  8.1 L    (21.8-53.1)  %


 


Mono % (Auto)  6.8    (5.3-12.2)  %


 


Eos % (Auto)  0 L    (0.8-7.0)  


 


Baso % (Auto)  0.0 L    (0.1-1.2)  %


 


Neut # (Auto)  9.39 H    (1.78-5.38)  K/mm3


 


Lymph # (Auto)  0.90 L    (1.32-3.57)  K/mm3


 


Mono # (Auto)  0.76    (0.30-0.82)  K/mm3


 


Eos # (Auto)  0.00 L    (0.04-0.54)  K/mm3


 


Baso # (Auto)  0.00 L    (0.01-0.08)  K/mm3


 


Manual Slide Review  Abnormal smear    


 


Sodium   138   (136-145)  mEq/L


 


Potassium   4.8   (3.5-5.1)  mEq/L


 


Chloride   107   ()  mEq/L


 


Carbon Dioxide   24   (21-32)  mEq/L


 


Anion Gap   11.8   (5-15)  


 


BUN   53 H   (7-18)  mg/dL


 


Creatinine   2.2 H   (0.7-1.3)  mg/dL


 


Est Cr Clr Drug Dosing   24.57   mL/min


 


Estimated GFR (MDRD)   29   (>60)  mL/min


 


BUN/Creatinine Ratio   24.1 H   (14-18)  


 


Glucose   138 H   ()  mg/dL


 


POC Glucose     ()  mg/dL


 


Calcium   9.0   (8.5-10.1)  mg/dL


 


Magnesium   2.2   (1.8-2.4)  mg/dl


 


NT-Pro-B Natriuret Pep    7547 H  (0-450)  pg/mL














  12/28/18 12/28/18 Range/Units





  06:33 10:54 


 


WBC    (4.23-9.07)  K/mm3


 


RBC    (4.63-6.08)  M/mm3


 


Hgb    (13.7-17.5)  gm/L


 


Hct    (40.1-51.0)  %


 


MCV    (79.0-92.2)  fl


 


MCH    (25.7-32.2)  pg


 


MCHC    (32.2-35.5)  g/dl


 


RDW Std Deviation    (35.1-43.9)  fL


 


Plt Count    (163-337)  K/mm3


 


MPV    (9.4-12.3)  fl


 


Neut % (Auto)    (34.0-67.9)  %


 


Lymph % (Auto)    (21.8-53.1)  %


 


Mono % (Auto)    (5.3-12.2)  %


 


Eos % (Auto)    (0.8-7.0)  


 


Baso % (Auto)    (0.1-1.2)  %


 


Neut # (Auto)    (1.78-5.38)  K/mm3


 


Lymph # (Auto)    (1.32-3.57)  K/mm3


 


Mono # (Auto)    (0.30-0.82)  K/mm3


 


Eos # (Auto)    (0.04-0.54)  K/mm3


 


Baso # (Auto)    (0.01-0.08)  K/mm3


 


Manual Slide Review    


 


Sodium    (136-145)  mEq/L


 


Potassium    (3.5-5.1)  mEq/L


 


Chloride    ()  mEq/L


 


Carbon Dioxide    (21-32)  mEq/L


 


Anion Gap    (5-15)  


 


BUN    (7-18)  mg/dL


 


Creatinine    (0.7-1.3)  mg/dL


 


Est Cr Clr Drug Dosing    mL/min


 


Estimated GFR (MDRD)    (>60)  mL/min


 


BUN/Creatinine Ratio    (14-18)  


 


Glucose    ()  mg/dL


 


POC Glucose  136 H  145 H  ()  mg/dL


 


Calcium    (8.5-10.1)  mg/dL


 


Magnesium    (1.8-2.4)  mg/dl


 


NT-Pro-B Natriuret Pep    (0-450)  pg/mL











Med Orders - Current: 


 Current Medications





Acetaminophen (Tylenol)  650 mg PO Q4H PRN


   PRN Reason: Pain/Fever


Hydrocodone Bitart/Acetaminophen (Norco 325-5 Mg)  1 tab PO Q4H PRN


   PRN Reason: Pain (moderate 4-6)


Albuterol/Ipratropium (Duoneb 3.0-0.5 Mg/3 Ml)  3 ml INH QIDRT [facility]


   Last Admin: 12/28/18 09:38 Dose:  3 ml


Albuterol/Ipratropium (Duoneb 3.0-0.5 Mg/3 Ml)  3 ml NEB Q4H PRN


   PRN Reason: Wheezing


   Last Admin: 12/28/18 13:34 Dose:  3 ml


Alogliptin Benzoate (Alogliptin)  25 mg PO DAILY [facility]


   Last Admin: 12/28/18 08:01 Dose:  25 mg


Aspirin (Halfprin)  81 mg PO DAILY [facility]


   Last Admin: 12/28/18 08:01 Dose:  81 mg


Azithromycin (Zithromax)  250 mg PO DAILY [facility]


   Last Admin: 12/28/18 08:01 Dose:  250 mg


Bisacodyl (Dulcolax)  5 mg PO DAILY PRN


   PRN Reason: Constipation


   Last Admin: 12/28/18 08:00 Dose:  5 mg


Docusate Sodium (Colace)  100 mg PO BID PRN


   PRN Reason: Constipation


Ferrous Sulfate (Ferrous Sulfate)  325 mg PO DAILY [facility]


   Last Admin: 12/28/18 08:01 Dose:  325 mg


Flunisolide (Nasalide Nasal Spray)  0 ml NASBOTH Q12H [facility]


   Last Admin: 12/28/18 08:02 Dose:  2 sprays


Glipizide (Glucotrol Xl)  5 mg PO BID [facility]


   Last Admin: 12/28/18 08:01 Dose:  5 mg


Guaifenesin/Phenylephrine HCl (Robitussin Dm)  5 ml PO Q4H PRN


   PRN Reason: Cough


Guaifenesin/Phenylephrine HCl (Robitussin Dm)  10 ml PO TID@0800,1400,2100 [facility]


   Last Admin: 12/28/18 13:24 Dose:  10 ml


Promethazine HCl 6.25 mg/ (Sodium Chloride)  50.25 mls @ 100 mls/hr IV Q6H PRN


   PRN Reason: Nausea/Vomiting


Sodium Chloride (Normal Saline)  1,000 mls @ 50 mls/hr IV ASDIRECTED [facility]


   Last Admin: 12/28/18 08:38 Dose:  50 mls/hr


Insulin Human Lispro (Humalog)  0 unit SUBCUT QIDACANDBED [facility]; Protocol


   Last Admin: 12/28/18 11:52 Dose:  Not Given


Lorazepam (Ativan)  0.25 mg IV Q6H PRN


   PRN Reason: Anxiety


Metoprolol Succinate (Toprol Xl)  100 mg PO QAM [facility]


   Last Admin: 12/28/18 08:01 Dose:  100 mg


Miscellaneous Information (Remove Patch)  1 ea TRDERM DAILY [facility]


   Last Admin: 12/28/18 08:02 Dose:  Not Given


Morphine Sulfate (Morphine)  0.5 mg IVPUSH Q4H PRN


   PRN Reason: Dyspnea/SOB/CP


   Stop: 12/30/18 17:33


Nicotine (Habitrol)  21 mg TRDERM DAILY PRN


   PRN Reason: Nicotine Dependence


Ondansetron HCl (Zofran)  4 mg IV Q6H PRN


   PRN Reason: Nausea/Vomiting


Ondansetron HCl (Zofran Odt)  4 mg PO BEDTIME [facility]


   Last Admin: 12/27/18 20:37 Dose:  4 mg


Pantoprazole Sodium (Protonix***)  40 mg PO DAILY [facility]


   Last Admin: 12/28/18 08:01 Dose:  40 mg


Polyethylene Glycol (Miralax)  17 gm PO DAILY PRN


   PRN Reason: Constipation


Rivaroxaban (Xarelto)  15 mg PO QPM [facility]


   Last Admin: 12/27/18 17:32 Dose:  15 mg


Rosuvastatin Calcium (Crestor)  10 mg PO BEDTIME [facility]


   Last Admin: 12/27/18 20:37 Dose:  10 mg


Senna/Docusate Sodium (Senna Plus)  1 tab PO BID PRN


   PRN Reason: Constipation


   Last Admin: 12/28/18 08:00 Dose:  1 tab


Sertraline HCl (Zoloft)  25 mg PO BEDTIME [facility]


   Last Admin: 12/27/18 20:38 Dose:  25 mg


Spironolactone (Aldactone)  25 mg PO QAM [facility]


   Last Admin: 12/28/18 08:00 Dose:  25 mg


Tamsulosin HCl (Flomax)  0.4 mg PO QPM [facility]


   Last Admin: 12/27/18 17:32 Dose:  0.4 mg


Temazepam (Restoril)  7.5 mg PO BEDTIME PRN


   PRN Reason: Sleep


   Last Admin: 12/25/18 20:51 Dose:  7.5 mg


Tramadol HCl (Ultram)  50 mg PO QID PRN


   PRN Reason: Pain





Discontinued Medications





Albuterol (Proventil Neb Soln)  2.5 mg NEB ONETIME ONE


   Stop: 12/25/18 16:03


   Last Admin: 12/25/18 16:33 Dose:  2.5 mg


Albuterol/Ipratropium (Duoneb 3.0-0.5 Mg/3 Ml)  3 ml NEB ONETIME ONE


   Stop: 12/25/18 14:44


   Last Admin: 12/25/18 15:02 Dose:  3 ml


Albuterol/Ipratropium (Duoneb 3.0-0.5 Mg/3 Ml)  3 ml INH QID [facility]


   Last Admin: 12/26/18 16:57 Dose:  3 ml


Bumetanide (Bumex)  0.5 mg PO DAILY [facility]


Bumetanide (Bumex)  0.5 mg PO DAILY [facility]


Bumetanide (Bumex)  0.5 mg IVPUSH ONETIME ONE


   Stop: 12/28/18 13:26


   Last Admin: 12/28/18 13:24 Dose:  0.5 mg


Furosemide (Lasix)  20 mg IVPUSH ONETIME ONE


   Stop: 12/25/18 16:39


   Last Admin: 12/25/18 16:46 Dose:  20 mg


Guaifenesin/Phenylephrine HCl (Robitussin Dm)  10 ml PO TID@0700,1400,2100 [facility]


   Last Admin: 12/25/18 20:38 Dose:  10 ml


Azithromycin 500 mg/ Sodium (Chloride)  250 mls @ 250 mls/hr IV ONETIME ONE


   Stop: 12/25/18 18:38


   Last Admin: 12/25/18 19:03 Dose:  250 mls/hr


Sodium Chloride (Normal Saline)  500 mls @ 999 mls/hr IV .BOLUS ONE


   Stop: 12/25/18 18:19


   Last Admin: 12/25/18 18:56 Dose:  999 mls/hr


Sodium Chloride (Normal Saline)  1,000 mls @ 250 mls/hr IV ASDIRECTED [facility]


Sodium Chloride (Normal Saline)  500 mls @ 999 mls/hr IV .BOLUS ONE


   Stop: 12/28/18 08:45


   Last Admin: 12/28/18 08:38 Dose:  999 mls/hr


Methylprednisolone Sodium Succinate (Solu-Medrol)  60 mg IVPUSH Q8H [facility]


Methylprednisolone Sodium Succinate (Solu-Medrol)  60 mg IVPUSH Q8H [facility]


   Last Admin: 12/27/18 05:16 Dose:  60 mg


Methylprednisolone Sodium Succinate (Solu-Medrol)  60 mg IVPUSH Q12H [facility]


   Last Admin: 12/27/18 14:10 Dose:  Not Given


Methylprednisolone Sodium Succinate (Solu-Medrol)  40 mg IVPUSH Q12H [facility]


Methylprednisolone Sodium Succinate (Solu-Medrol)  40 mg IVPUSH ONETIME ONE


   Stop: 12/28/18 14:16


   Last Admin: 12/28/18 14:54 Dose:  40 mg


Ondansetron HCl (Zofran Odt)  4 mg PO Q6H [facility]


   Last Admin: 12/26/18 14:21 Dose:  Not Given


Prednisone (Prednisone)  20 mg PO ONETIME ONE


   Stop: 12/25/18 16:39


   Last Admin: 12/25/18 16:46 Dose:  20 mg


Rivaroxaban (Xarelto)  20 mg PO QPM [facility]


   Last Admin: 12/25/18 19:03 Dose:  20 mg


Spironolactone (Aldactone)  25 mg PO QAM [facility]


   Last Admin: 12/27/18 08:17 Dose:  25 mg











- Exam


Quality Assessment: Reports: Supplemental Oxygen, DVT Prophylaxis


General: Reports: Alert, Cooperative, No Acute Distress


HEENT: Reports: Pupils Equal, Pupils Reactive, EOMI, Mucous Membr. Moist/Pink


Neck: Reports: Supple


Lungs: Reports: Normal Respiratory Effort, Decreased Breath Sounds


Cardiovascular: Reports: Regular Rate, Regular Rhythm


GI/Abdominal Exam: Normal Bowel Sounds, Soft, Non-Tender, No Organomegaly, No 

Distention, No Abnormal Bruit, No Mass, Pelvis Stable


 (Male) Exam: Deferred


Rectal (Males) Exam: Deferred


Back Exam: Reports: Normal Inspection


Extremities: Normal Inspection, Normal Range of Motion, Non-Tender, No Pedal 

Edema, Normal Capillary Refill


Skin: Reports: Warm, Dry, Intact


Neurological: Reports: No New Focal Deficit


Psy/Mental Status: Reports: Alert, Normal Affect, Normal Mood

## 2018-12-28 NOTE — CR
Chest: Portable view of the chest was obtained.

 

Comparison: Prior chest x-ray of 12/27/18.

 

Heart is slightly enlarged.  Tortuous thoracic aorta is seen.  Stable 

pulmonary vascular congestion is seen centrally.  Lungs otherwise are 

clear.  Bony structures are grossly intact.

 

Impression:

1.  Cardiomegaly and mild central pulmonary vascular congestion which 

appears stable from prior chest x-ray.

2.  Nothing acute is otherwise seen.

 

Diagnostic code #3

## 2018-12-28 NOTE — US
Renal ultrasound with renal arterial Doppler: Duplex and color flow 

imaging was obtained of the renal arteries as well as multiple 

real-time images of the kidneys.

 

Comparison: Previous CT abdomen and pelvis exam of 06/20/17.

 

Kidney showed no hydronephrosis or mass.  Prior CT exam showed a small

 cortical lesion within both kidneys which are too small to 

appreciated on this ultrasound exam.

 

 

Doppler evaluation was attempted of the renal arteries which was 

unsuccessful due to patient breathing.  Exam attempted by 2 

technologists.

 

Measurements:

Right kidney length: 8.8 cm

Left kidney length: 10.6 cm

Prevoid bladder volume 463 mL, postvoid bladder volume 426 mL

 

Impression:

1.  Significant postvoid residual.

2.  Arterial Doppler unsuccessful despite attempts by 2 technologists 

due to patient's breathing.

3.  Kidney show no hydronephrosis.

 

Diagnostic code #3

## 2020-03-14 NOTE — EDM.PDOC
ED HPI GENERAL MEDICAL PROBLEM





- General


Chief Complaint: Respiratory Problem


Stated Complaint: [facility]


Time Seen by Provider: 03/14/20 13:22


Source of Information: Reports: Patient, Family


History Limitations: Reports: No Limitations





- History of Present Illness


INITIAL COMMENTS - FREE TEXT/NARRATIVE: 





Patient is an 80-year-old male who presents via [facility] from [facility] with complaints of shortness of breath with exertion as well as 

swelling to his lower extremities.  Per report of patient and his sister, the 

symptoms have been going on for "a couple weeks ".  Shortness of breath occurs 

with exertion and.  POA states that he cannot walk more than 10 to 15 feet 

without becoming short of breath.  He does have a history of COPD and CHF and 

has required supplemental O2 in the past.  Sister states that he has had 

approximately 5 pound weight gain over the last week and a half.  He does not 

normally have dependent edema in his lower extremities, however this has been 

pretty present over the last week and a half.  Patient's primary care provider 

is Dr. Cain.  Sister states that they saw him last week, however it was a very 

brief appointment and they did not discuss his shortness of breath.  Patient 

does see Dr. Augustine for chronic kidney disease.  He has had no fever, nausea, 

vomiting, or diarrhea.





- Related Data


 Allergies











Allergy/AdvReac Type Severity Reaction Status Date / Time


 


No Known Allergies Allergy   Verified 03/14/20 13:13











Home Meds: 


 Home Meds





Aspirin [Halfprin] 81 mg PO DAILY #30 tab.ec 07/14/17 [Rx]


Ferrous Sulfate 325 mg PO DAILY #30 tablet 07/14/17 [Rx]


Sertraline [Zoloft] 25 mg PO BEDTIME #30 tablet 07/14/17 [Rx]


Acetaminophen [Tylenol] 650 mg PO Q4H PRN 04/15/18 [History]


Bumetanide 0.5 mg PO DAILY 04/15/18 [History]


Fluticasone Propionate [Flonase Allergy Relief] 1 spray NASBOTH DAILY 04/15/18 [

History]


Omeprazole 20 mg PO DAILY 04/15/18 [History]


Ondansetron HCl [Zofran] 4 mg PO Q6H 04/15/18 [History]


Spironolactone [Aldactone] 25 mg PO QAM 04/15/18 [History]


atorvaSTATin [Lipitor] 40 mg PO QPM 04/15/18 [History]


traMADol [Ultram] 50 mg PO QID PRN 04/15/18 [History]


SitaGLIPtin [Januvia] 100 mg PO DAILY 12/25/18 [History]


Albuterol/Ipratropium [DuoNeb 3.0-0.5 MG/3 ML] 3 ml INH ASDIRECTED #1 12/28/18 [

Rx]


Azithromycin [Zithromax] 250 mg PO DAILY #3 tablet 12/28/18 [Rx]


Dextromethorphan/guaiFENesin [Robitussin DM] 5 ml PO Q4H PRN #15 cup 12/28/18 [

Rx]


Fluticasone/Salmeterol [Advair 250-50 Diskus] 14 each IH ASDIRECTED #1 

disk.w.dev 12/28/18 [Rx]


Metoprolol Succinate 100 mg PO QAM #0 12/28/18 [Rx]


Rivaroxaban [Xarelto] 15 mg PO QPM #30 tablet 12/28/18 [Rx]


Tamsulosin [Flomax] 0.8 mg PO QPM #60 cap.er 12/28/18 [Rx]


glipiZIDE [Glucotrol XL] 5 mg PO BID #8 tab.er 12/28/18 [Rx]


Bumetanide [Bumex] 1 mg PO DAILY 4 Days #4 tab 03/14/20 [Rx]











Past Medical History


HEENT History: Reports: Impaired Vision


Cardiovascular History: Reports: Afib, Angina, Heart Failure, High Cholesterol, 

Hypertension


Respiratory History: Reports: COPD


Gastrointestinal History: Reports: Colon Polyp, GERD, Other (See Below)


Other Gastrointestinal History: pancreatic cyst


Genitourinary History: Reports: Prostate Disorder, Other (See Below)


Other Genitourinary History: bladder cancer


OB/GYN History: Reports: None


Musculoskeletal History: Reports: Back Pain, Chronic, Other (See Below)


Other Musculoskeletal History: torn cartilege to left knee


Neurological History: Reports: CVA, Migraines, TIA


Psychiatric History: Reports: Addiction, Depression


Endocrine/Metabolic History: Reports: Diabetes, Type II


Hematologic History: Reports: None


Immunologic History: Reports: None


Oncologic (Cancer) History: Reports: Bladder


Dermatologic History: Reports: None





- Infectious Disease History


Infectious Disease History: Reports: Chicken Pox, Measles, Mumps, Shingles





- Past Surgical History


Head Surgeries/Procedures: Reports: None


HEENT Surgical History: Reports: None


Cardiovascular Surgical History: Reports: None


Respiratory Surgical History: Reports: None


GI Surgical History: Reports: Appendectomy, Cholecystectomy, Colonoscopy, Hernia

, Inguinal, Other (See Below)


Other GI Surgeries/Procedures: hemorroidectomy


Male  Surgical History: Reports: None, TURP-Transurethral Resection of 

Prostate


Endocrine Surgical History: Reports: None


Neurological Surgical History: Reports: None


Musculoskeletal Surgical History: Reports: Shoulder Surgery


Oncologic Surgical History: Reports: None


Dermatological Surgical History: Reports: None





Social & Family History





- Family History


Family Medical History: Noncontributory





- Tobacco Use


Smoking Status *Q: Former Smoker


Years of Tobacco use: 65


Packs/Tins Daily: 1


Used Tobacco, but Quit: Yes


Month/Year Tobacco Last Used: 1/18


Second Hand Smoke Exposure: No





- Caffeine Use


Caffeine Use: Reports: Coffee





- Recreational Drug Use


Recreational Drug Use: No





- Living Situation & Occupation


Living situation: Reports: , Alone


Occupation: Retired





ED ROS GENERAL





- Review of Systems


Review Of Systems: See Below


Constitutional: Reports: No Symptoms.  Denies: Fever, Chills, Decreased Appetite


HEENT: Reports: No Symptoms


Respiratory: Reports: Shortness of Breath.  Denies: Cough, Sputum


Cardiovascular: Reports: Dyspnea on Exertion, Edema.  Denies: Chest Pain, 

Lightheadedness, Palpitations, Syncope


Endocrine: Reports: No Symptoms


GI/Abdominal: Reports: No Symptoms


: Reports: No Symptoms


Musculoskeletal: Reports: No Symptoms


Skin: Reports: No Symptoms


Neurological: Reports: No Symptoms


Psychiatric: Reports: No Symptoms


Hematologic/Lymphatic: Reports: No Symptoms


Immunologic: Reports: No Symptoms





ED EXAM, GENERAL





- Physical Exam


Exam: See Below


Exam Limited By: No Limitations


General Appearance: Alert, WD/WN, No Apparent Distress


Respiratory/Chest: No Respiratory Distress, Normal Breath Sounds, No Accessory 

Muscle Use, Chest Non-Tender, Other (Fine crackles to the bilateral lower 

lobes.  No audible wheezing.)


Cardiovascular: Normal Peripheral Pulses, Regular Rate, Rhythm, No Gallop, No 

JVD, No Murmur, No Rub, Other (3+ ankle edema)


GI/Abdominal: Normal Bowel Sounds, Soft, Non-Tender, No Organomegaly, No 

Distention, No Abnormal Bruit, No Mass


Neurological: Alert, Oriented, CN II-XII Intact, Normal Cognition, Normal Gait, 

Normal Reflexes, No Motor/Sensory Deficits


Psychiatric: Normal Affect, Normal Mood


Skin Exam: Warm, Dry, Intact, Normal Color, No Rash





Course





- Vital Signs


Last Recorded V/S: 


 Last Vital Signs











Temp  98.5 F   03/14/20 13:13


 


Pulse  76   03/14/20 13:13


 


Resp  26 H  03/14/20 13:13


 


BP  133/77   03/14/20 13:13


 


Pulse Ox  93 L  03/14/20 13:13














- Orders/Labs/Meds


Orders: 


 Active Orders 24 hr











 Category Date Time Status


 


 EKG Documentation Completion [RC] STAT Care  03/14/20 13:48 Active


 


 Peripheral IV Care [RC] .AS DIRECTED Care  03/14/20 13:43 Active


 


 Peripheral IV Insertion Adult [OM.PC] Stat Oth  03/14/20 13:43 Ordered











Labs: 


 Laboratory Tests











  03/14/20 03/14/20 03/14/20 Range/Units





  14:10 14:10 14:10 


 


WBC  9.02    (4.23-9.07)  K/mm3


 


RBC  3.82 L    (4.63-6.08)  M/mm3


 


Hgb  11.8 L D    (13.7-17.5)  gm/dl


 


Hct  36.5 L    (40.1-51.0)  %


 


MCV  95.5 H    (79.0-92.2)  fl


 


MCH  30.9    (25.7-32.2)  pg


 


MCHC  32.3    (32.2-35.5)  g/dl


 


RDW Std Deviation  54.4 H    (35.1-43.9)  fL


 


Plt Count  297    (163-337)  K/mm3


 


MPV  8.8 L    (9.4-12.3)  fl


 


Neut % (Auto)  70.8 H    (34.0-67.9)  %


 


Lymph % (Auto)  12.6 L    (21.8-53.1)  %


 


Mono % (Auto)  10.6    (5.3-12.2)  %


 


Eos % (Auto)  5.5    (0.8-7.0)  


 


Baso % (Auto)  0.3    (0.1-1.2)  %


 


Neut # (Auto)  6.37 H    (1.78-5.38)  K/mm3


 


Lymph # (Auto)  1.14 L    (1.32-3.57)  K/mm3


 


Mono # (Auto)  0.96 H    (0.30-0.82)  K/mm3


 


Eos # (Auto)  0.50    (0.04-0.54)  K/mm3


 


Baso # (Auto)  0.03    (0.01-0.08)  K/mm3


 


Sodium   141   (136-145)  mEq/L


 


Potassium   3.6   (3.5-5.1)  mEq/L


 


Chloride   104   ()  mEq/L


 


Carbon Dioxide   27   (21-32)  mEq/L


 


Anion Gap   13.6   (5-15)  


 


BUN   40 H   (7-18)  mg/dL


 


Creatinine   2.4 H   (0.7-1.3)  mg/dL


 


Est Cr Clr Drug Dosing   22.57   mL/min


 


Estimated GFR (MDRD)   26   (>60)  mL/min


 


BUN/Creatinine Ratio   16.7   (14-18)  


 


Glucose   121 H   ()  mg/dL


 


Calcium   9.5   (8.5-10.1)  mg/dL


 


Total Bilirubin   1.2 H   (0.2-1.0)  mg/dL


 


AST   19   (15-37)  U/L


 


ALT   26   (16-63)  U/L


 


Alkaline Phosphatase   96   ()  U/L


 


NT-Pro-B Natriuret Pep    7002 H  (0-450)  pg/mL


 


Total Protein   8.3 H   (6.4-8.2)  g/dl


 


Albumin   3.8   (3.4-5.0)  g/dl


 


Globulin   4.5   gm/dL


 


Albumin/Globulin Ratio   0.8 L   (1-2)  











Meds: 


Medications














Discontinued Medications














Generic Name Dose Route Start Last Admin





  Trade Name Freq  PRN Reason Stop Dose Admin


 


Bumetanide  0.5 mg  03/14/20 15:03  03/14/20 15:19





  Bumex  PO  03/14/20 15:04  Not Given





  ONETIME ONE   





     





     





     





     


 


Bumetanide  1 mg  03/14/20 15:03  03/14/20 15:19





  Bumex  PO  03/14/20 15:04  1 mg





  ONETIME ONE   Administration





     





     





     





     


 


Sodium Chloride  10 ml  03/14/20 13:43  03/14/20 14:25





  Saline Flush  FLUSH   10 ml





  ASDIRECTED PRN   Administration





  Keep Vein Open   





     





     





     














- Re-Assessments/Exams


Free Text/Narrative Re-Assessment/Exam: 


Patient's hematology was significant for a mildly decreased hemoglobin at 11.8.

  BUN elevated at 40, creatinine elevated at 2.4, BNP elevated at 7002.  Chest x

-ray is significant for pulmonary vascular congestion.  Based on patient's 

symptoms and work-up, he is suffering from an acute exacerbation of his CHF.  

His oxygen saturations have been stable on room air ranging from 93 to 97%.  He 

is currently on Bumex 0.5 mg daily.  We will give him a dose of Bumex 1 mg p.o. 

here in the emergency department.  Recommend that he increase his Bumex to 1 mg 

daily for the next 4 days and call his primary care provider Monday morning to 

schedule a follow-up for subsequent diuretic adjustments.  Discharge 

instructions as documented.








Departure





- Departure


Time of Disposition: 15:04


Disposition: Home, Self-Care 01


Condition: Fair


Clinical Impression: 


CHF exacerbation


Qualifiers:


 Heart failure type: unspecified Qualified Code(s): I50.9 - Heart failure, 

unspecified








- Discharge Information


*PRESCRIPTION DRUG MONITORING PROGRAM REVIEWED*: No


*COPY OF PRESCRIPTION DRUG MONITORING REPORT IN PATIENT CARA: No


Prescriptions: 


Bumetanide [Bumex] 1 mg PO DAILY 4 Days #4 tab


Instructions:  Heart Failure, Easy-to-Read


Referrals: 


David Herman MD [Primary Care Provider] - 


Forms:  ED Department Discharge


Additional Instructions: 


You were seen in the emergency department today for shortness of breath with 

exertion for the last couple weeks.  Your work-up included blood work, an EKG, 

and  a chest x-ray.  The results of your work-up was consistent with an 

exacerbation of your congestive heart failure.  Your oxygen saturation 

throughout your stay in the ER was normal at 93 to 97% without supplemental 

oxygen.  While in the ER, you received a dose of Bumex 1 mg.  Recommended that 

you increase your dose of Bumex to 1 mg daily for the next 4 days or until you 

follow-up with Dr. Herman.  [facility]  I recommend that you call to schedule follow-up appointment with Dr. Herman first thing Monday morning.  Also recommend that you avoid overexertion 

over the next couple days to avoid increased shortness of breath.  If you 

should experience any worsening symptoms, please do not hesitate to return to 

the emergency department.





Sepsis Event Note





- Evaluation


Sepsis Screening Result: No Definite Risk





- Focused Exam


Vital Signs: 


 Vital Signs











  Temp Pulse Resp BP Pulse Ox


 


 03/14/20 13:13  98.5 F  76  26 H  133/77  93 L











Date Exam was Performed: 03/14/20


Time Exam was Performed: 20:47





- My Orders


Last 24 Hours: 


My Active Orders





03/14/20 13:43


Peripheral IV Care [RC] .AS DIRECTED 


Peripheral IV Insertion Adult [OM.PC] Stat 





03/14/20 13:48


EKG Documentation Completion [RC] STAT 














- Assessment/Plan


Last 24 Hours: 


My Active Orders





03/14/20 13:43


Peripheral IV Care [RC] .AS DIRECTED 


Peripheral IV Insertion Adult [OM.PC] Stat 





03/14/20 13:48


EKG Documentation Completion [RC] STAT

## 2020-03-14 NOTE — CR
Chest: 2 views of the chest were obtained.

 

Comparison: Prior chest x-ray of 12/28/18.

 

Heart is enlarged.  Increased lung markings are seen most likely due 

to pulmonary vascular congestion.  Bony structures are grossly intact.

 

Impression:

1.  Findings suspicious for CHF.  Please correlate if this matches 

patient's clinical symptoms.

 

Diagnostic code #3

 

This report was dictated in MDT

## 2020-09-11 NOTE — CR
Chest: 2 views of the chest were obtained.

 

Comparison: Prior chest x-ray of 03/14/20.

 

Heart is slightly enlarged.  Tortuous thoracic aorta is seen.  Minimal

 atelectasis is seen within the left lung base involving the lingula. 

 Lungs otherwise are clear.  Bony structures shows mild scattered 

degenerative change within the spine.

 

Impression:

1.  Slight lingular atelectasis.

2.  Mild cardiomegaly.

3.  Nothing acute is appreciated.

 

Diagnostic code #2

 

This report was dictated in MDT

## 2020-09-11 NOTE — EDM.PDOC
ED HPI GENERAL MEDICAL PROBLEM





- General


Chief Complaint: General


Stated Complaint: [facility]


Time Seen by Provider: 09/11/20 02:22


Source of Information: Reports: Patient, RN Notes Reviewed


History Limitations: Reports: No Limitations





- History of Present Illness


INITIAL COMMENTS - FREE TEXT/NARRATIVE: 





Mr. Rush is a pleasant 80-year-old gentleman with a past medical history 

significant for both COPD, CHF, and chronic atrial fibrillation, who is now 

brought to the ED by EMS with a report of several months of a cough, with 1 day 

of generalized weakness, and decreased oxygen saturations, fluctuating between 

97 and 92%, noted tonight.  The patient states that he has had some shortness of

breath for "not very long".  He denies recent chest pain, palpitations, or 

fever.





We are aware that there and at least one case of COVID-19 at [facility], where 

the patient resides.





Here in the ED, the patient is found to be hemodynamically stable, afebrile, 

saturating 89% on room air, however, like at [facility], his oxygen saturation 

has been fluctuating between the upper 80s and lower 90s, however, the patient 

is also in atrial fibrillation which he causes artificially low SpO2 readings.





Other than the above symptoms, the patient denies having a recent fever, chills,

sore throat, ear pain, nasal or sinus congestion, chest pain, palpitations, 

nausea, vomiting, constipation, diarrhea, abdominal pain, urinary symptoms, 

recent weight gain or weight loss, recent bloody bowel movements or black bowel 

movements, recent joint aches, headaches, or rashes.








The patient's PCP is Dr. Francis Blanca.











- Related Data


                                    Allergies











Allergy/AdvReac Type Severity Reaction Status Date / Time


 


No Known Allergies Allergy   Verified 03/14/20 13:13











Home Meds: 


                                    Home Meds





Aspirin [Halfprin] 81 mg PO DAILY #30 tab.ec 07/14/17 [Rx]


Ferrous Sulfate 325 mg PO DAILY #30 tablet 07/14/17 [Rx]


Acetaminophen [Tylenol] 650 mg PO Q4H PRN 04/15/18 [History]


Fluticasone Propionate [Flonase Allergy Relief] 1 spray NASBOTH DAILY 04/15/18 

[History]


Omeprazole 20 mg PO DAILY 04/15/18 [History]


Spironolactone [Aldactone] 25 mg PO QAM 04/15/18 [History]


atorvaSTATin [Lipitor] 40 mg PO BEDTIME 04/15/18 [History]


ondansetron HCL [Zofran] 4 mg PO Q6H 04/15/18 [History]


SitaGLIPtin [Januvia] 100 mg PO DAILY 12/25/18 [History]


Metoprolol Succinate 100 mg PO QAM #0 12/28/18 [Rx]


Tamsulosin [Flomax] 0.8 mg PO QPM #60 cap.er 12/28/18 [Rx]


Albuterol/Ipratropium [DuoNeb 3.0-0.5 MG/3 ML] 3 ml INH Q4H PRN 09/11/20 [Hi

story]


Aloe Vera/Sodium Chloride [Ayr Saline Nasal Gel] 1 applic RHONA TID PRN 09/11/20 

[History]


Budesonide/Formoterol [Symbicort 160-4.5 MCG] 2 puff INH BID 09/11/20 [History]


Calcium Alginate [Kaleb] 1 each .XX ASDIRECTED PRN 09/11/20 [History]


Cholecalciferol (Vitamin D3) [Vitamin D3] 1,000 unit PO DAILY 09/11/20 [History]


Docusate Sodium/Sennosides [Senna Plus] 1 each PO DAILY 09/11/20 [History]


Ethacrynic Acid 50 mg PO BID 09/11/20 [History]


Glimepiride [Amaryl] 2 mg PO DAILY 09/11/20 [History]


Insulin Glarg,Human.Rec.Analog [LantUS] 10 unit SQ BEDTIME 09/11/20 [History]


Rivaroxaban [Xarelto] 10 mg PO QPM 09/11/20 [History]


Sertraline [Zoloft] 50 mg PO BEDTIME 09/11/20 [History]


calcitrioL [Calcitriol] 0.25 mcg PO MOFR 09/11/20 [History]











Past Medical History


HEENT History: Reports: Hard of Hearing (wears hearing aids), Impaired Vision


Cardiovascular History: Reports: Afib (chronic), Heart Failure, High Cholesterol

, Hypertension


Respiratory History: Reports: COPD


Gastrointestinal History: Reports: Colon Polyp, GERD, Other (See Below) 

(Pancreatic cyst)


Genitourinary History: Reports: BPH


Psychiatric History: Reports: Depression


Endocrine/Metabolic History: Reports: Diabetes, Type II


Oncologic (Cancer) History: Reports: Bladder





- Infectious Disease History


Infectious Disease History: Reports: Chicken Pox, Measles, Mumps, Shingles





- Past Surgical History


GI Surgical History: Reports: Appendectomy, Cholecystectomy, Colonoscopy, 

Hernia, Inguinal, Other (See Below) (Hemorroidectomy)


Male  Surgical History: Reports: TURP-Transurethral Resection of Prostate


Musculoskeletal Surgical History: Reports: Shoulder Surgery (left)





Social & Family History





- Family History


Family Medical History: Noncontributory





- Tobacco Use


Smoking Status *Q: Former Smoker


Years of Tobacco use: 51


Packs/Tins Daily: 1.5


Month/Year Tobacco Last Used: Quit 2005





- Caffeine Use


Caffeine Use: Reports: Coffee





- Alcohol Use


Alcohol Use History: Yes


Alcohol Use Frequency: Socially





- Recreational Drug Use


Recreational Drug Use: No





- Living Situation & Occupation


Living situation: Reports: , Assisted Living ([facility])


Occupation: Retired





ED ROS GENERAL





- Review of Systems


Review Of Systems: Comprehensive ROS is negative, except as noted in HPI.


Musculoskeletal: Reports: Back Pain (chronic)





ED EXAM, GENERAL





- Physical Exam


Exam: See Below


Exam Limited By: No Limitations


General Appearance: Alert, WD/WN, No Apparent Distress


Eye Exam: Bilateral Eye: EOMI, Normal Inspection


Ears: Normal External Exam, Hearing Loss


Nose: Normal Inspection


Throat/Mouth: Normal Inspection, Normal Lips, Normal Voice, No Airway Compromise


Head: Atraumatic, Normocephalic


Neck: Normal Inspection, Full Range of Motion


Respiratory/Chest: No Respiratory Distress, Lungs Clear, Normal Breath Sounds, 

No Accessory Muscle Use.  No: Decreased Breath Sounds, Crackles, Rhonchi, 

Wheezing, Stridor, Prolonged Expiration


Cardiovascular: Normal Peripheral Pulses, No Edema, No Gallop, No JVD, No 

Murmur, No Rub, Irregularly Irregular (regular rate)


Peripheral Pulses: 3+: Radial (L), Radial (R)


GI/Abdominal: Normal Bowel Sounds, Soft, Non-Tender, No Organomegaly, No 

Distention, No Abnormal Bruit, No Mass


 (Male) Exam: Deferred


Rectal (Males) Exam: Deferred


Back Exam: Normal Inspection, Full Range of Motion, NT


Extremities: Normal Inspection, Normal Range of Motion, No Pedal Edema, Normal 

Capillary Refill


Neurological: Alert, Oriented, Normal Cognition, No Motor/Sensory Deficits


Psychiatric: Normal Affect


Skin Exam: Warm, Dry, Intact, Normal Color, No Rash





EKG INTERPRETATION


EKG Date: 09/11/20


Time: 02:41


Rhythm: A-Fib


Rate (Beats/Min): 84


Axis: LAD-Left Axis Deviation (borderline)


P-Wave: Absent


QRS: Normal (Late transition)


ST-T: Normal


QT: Prolonged (QTc 476 ms)


Comparison: No Change (3/14/2020)





Course





- Vital Signs


Last Recorded V/S: 


                                Last Vital Signs











Temp  36.5 C   09/11/20 02:15


 


Pulse  98   09/11/20 02:15


 


Resp  20   09/11/20 02:15


 


BP  117/81   09/11/20 02:15


 


Pulse Ox  89 L  09/11/20 02:15








                                        





Orthostatic Blood Pressure [     71/53


Standing]                        


Orthostatic Blood Pressure [     100/71


Sitting]                         


Orthostatic Blood Pressure [     121/52


Supine]                          











- Orders/Labs/Meds


Orders: 


                               Active Orders 24 hr











 Category Date Time Status


 


 EKG Documentation Completion [RC] STAT Care  09/11/20 02:29 Active


 


 Orthostatic Vital Signs [RC] STAT Care  09/11/20 02:32 Active


 


 Orthostatic Vital Signs [RC] STAT Care  09/11/20 03:56 Active


 


 Urinary Catheter Assessment [RC] ASDIRECTED Care  09/11/20 03:54 Active


 


 Urinary Catheter Insertion [Insert Urinary Catheter] [ Care  09/11/20 04:00 

Ordered





 OM.PC] Q24H   


 


 Chest 2V [CR] Stat Exams  09/11/20 02:30 Taken











Labs: 


                                Laboratory Tests











  09/11/20 09/11/20 09/11/20 Range/Units





  02:30 02:55 02:55 


 


WBC   6.96   (4.23-9.07)  K/mm3


 


RBC   4.45 L   (4.63-6.08)  M/mm3


 


Hgb   13.2 L   (13.7-17.5)  gm/dl


 


Hct   40.1   (40.1-51.0)  %


 


MCV   90.1  D   (79.0-92.2)  fl


 


MCH   29.7   (25.7-32.2)  pg


 


MCHC   32.9   (32.2-35.5)  g/dl


 


RDW Std Deviation   45.4 H   (35.1-43.9)  fL


 


Plt Count   219  D   (163-337)  K/mm3


 


MPV   8.8 L   (9.4-12.3)  fl


 


Neutrophils % (Manual)   78 H   (40-60)  %


 


Band Neutrophils %   0   (0-10)  %


 


Lymphocytes % (Manual)   10 L   (20-40)  %


 


Atypical Lymphs %   0   %


 


Monocytes % (Manual)   12 H   (2-10)  %


 


Eosinophils % (Manual)   0 L   (0.8-7.0)  %


 


Basophils % (Manual)   0 L   (0.2-1.2)  


 


Platelet Estimate   Adequate   


 


Plt Morphology Comment   Normal   


 


Hypochromasia   1+ slight   


 


RBC Morph Comment   Abnormal   


 


D-Dimer, Quantitative     (0.19-0.50)  mg/L


 


Sodium    133 L  (136-145)  mEq/L


 


Potassium    3.9  (3.5-5.1)  mEq/L


 


Chloride    94 L  ()  mEq/L


 


Carbon Dioxide    31  (21-32)  mEq/L


 


Anion Gap    11.9  (5-15)  


 


BUN    54 H  (7-18)  mg/dL


 


Creatinine    3.2 H  (0.7-1.3)  mg/dL


 


Est Cr Clr Drug Dosing    16.34  mL/min


 


Estimated GFR (MDRD)    19  (>60)  mL/min


 


BUN/Creatinine Ratio    16.9  (14-18)  


 


Glucose    165 H  ()  mg/dL


 


Calcium    9.0  (8.5-10.1)  mg/dL


 


Magnesium    2.1  (1.8-2.4)  mg/dl


 


Total Bilirubin    1.0  (0.2-1.0)  mg/dL


 


AST    29  (15-37)  U/L


 


ALT    36  (16-63)  U/L


 


Alkaline Phosphatase    92  ()  U/L


 


Troponin I    < 0.017  (0.00-0.056)  ng/mL


 


NT-Pro-B Natriuret Pep     (0-450)  pg/mL


 


Total Protein    7.7  (6.4-8.2)  g/dl


 


Albumin    3.3 L  (3.4-5.0)  g/dl


 


Globulin    4.4  gm/dL


 


Albumin/Globulin Ratio    0.8 L  (1-2)  


 


Urine Color     (Yellow)  


 


Urine Appearance     (Clear)  


 


Urine pH     (5.0-8.0)  


 


Ur Specific Gravity     (1.005-1.030)  


 


Urine Protein     (Negative)  


 


Urine Glucose (UA)     (Negative)  


 


Urine Ketones     (Negative)  


 


Urine Occult Blood     (Negative)  


 


Urine Nitrite     (Negative)  


 


Urine Bilirubin     (Negative)  


 


Urine Urobilinogen     (0.2-1.0)  


 


Ur Leukocyte Esterase     (Negative)  


 


U Hyaline Cast (Auto)     (0-5)  /lpf


 


Urine RBC     (0-5)  /hpf


 


Urine WBC     (0-5)  /hpf


 


Ur Squamous Epith Cells     (0-5)  /hpf


 


Urine Bacteria     (FEW)  /hpf


 


Urine Mucus     (FEW)  /hpf


 


SARS Virus RNA (PCR)  Positive H    (NEGATIVE)  














  09/11/20 09/11/20 09/11/20 Range/Units





  02:55 02:55 03:45 


 


WBC     (4.23-9.07)  K/mm3


 


RBC     (4.63-6.08)  M/mm3


 


Hgb     (13.7-17.5)  gm/dl


 


Hct     (40.1-51.0)  %


 


MCV     (79.0-92.2)  fl


 


MCH     (25.7-32.2)  pg


 


MCHC     (32.2-35.5)  g/dl


 


RDW Std Deviation     (35.1-43.9)  fL


 


Plt Count     (163-337)  K/mm3


 


MPV     (9.4-12.3)  fl


 


Neutrophils % (Manual)     (40-60)  %


 


Band Neutrophils %     (0-10)  %


 


Lymphocytes % (Manual)     (20-40)  %


 


Atypical Lymphs %     %


 


Monocytes % (Manual)     (2-10)  %


 


Eosinophils % (Manual)     (0.8-7.0)  %


 


Basophils % (Manual)     (0.2-1.2)  


 


Platelet Estimate     


 


Plt Morphology Comment     


 


Hypochromasia     


 


RBC Morph Comment     


 


D-Dimer, Quantitative  0.72 H    (0.19-0.50)  mg/L


 


Sodium     (136-145)  mEq/L


 


Potassium     (3.5-5.1)  mEq/L


 


Chloride     ()  mEq/L


 


Carbon Dioxide     (21-32)  mEq/L


 


Anion Gap     (5-15)  


 


BUN     (7-18)  mg/dL


 


Creatinine     (0.7-1.3)  mg/dL


 


Est Cr Clr Drug Dosing     mL/min


 


Estimated GFR (MDRD)     (>60)  mL/min


 


BUN/Creatinine Ratio     (14-18)  


 


Glucose     ()  mg/dL


 


Calcium     (8.5-10.1)  mg/dL


 


Magnesium     (1.8-2.4)  mg/dl


 


Total Bilirubin     (0.2-1.0)  mg/dL


 


AST     (15-37)  U/L


 


ALT     (16-63)  U/L


 


Alkaline Phosphatase     ()  U/L


 


Troponin I     (0.00-0.056)  ng/mL


 


NT-Pro-B Natriuret Pep   3358 H   (0-450)  pg/mL


 


Total Protein     (6.4-8.2)  g/dl


 


Albumin     (3.4-5.0)  g/dl


 


Globulin     gm/dL


 


Albumin/Globulin Ratio     (1-2)  


 


Urine Color    Yellow  (Yellow)  


 


Urine Appearance    Clear  (Clear)  


 


Urine pH    6.0  (5.0-8.0)  


 


Ur Specific Gravity    1.025  (1.005-1.030)  


 


Urine Protein    1+ H  (Negative)  


 


Urine Glucose (UA)    Negative  (Negative)  


 


Urine Ketones    Negative  (Negative)  


 


Urine Occult Blood    Negative  (Negative)  


 


Urine Nitrite    Negative  (Negative)  


 


Urine Bilirubin    Negative  (Negative)  


 


Urine Urobilinogen    0.2  (0.2-1.0)  


 


Ur Leukocyte Esterase    Negative  (Negative)  


 


U Hyaline Cast (Auto)    5-10 H  (0-5)  /lpf


 


Urine RBC    Not seen  (0-5)  /hpf


 


Urine WBC    Not seen  (0-5)  /hpf


 


Ur Squamous Epith Cells    Not seen  (0-5)  /hpf


 


Urine Bacteria    Not seen  (FEW)  /hpf


 


Urine Mucus    Not seen  (FEW)  /hpf


 


SARS Virus RNA (PCR)     (NEGATIVE)  











Meds: 


Medications














Discontinued Medications














Generic Name Dose Route Start Last Admin





  Trade Name Freq  PRN Reason Stop Dose Admin


 


Sodium Chloride  500 mls @ 1,000 mls/hr  09/11/20 03:49  09/11/20 04:05





  Normal Saline  IV  09/11/20 04:18  1,000 mls/hr





  .BOLUS ONE   Administration


 


Sodium Chloride  500 mls @ 1,000 mls/hr  09/11/20 05:27  09/11/20 05:34





  Normal Saline  IV  09/11/20 05:56  1,000 mls/hr





  .BOLUS ONE   Administration














- Re-Assessments/Exams


Free Text/Narrative Re-Assessment/Exam: 





09/11/20 02:33


As above, the patient has had several months of a cough, and is now sent to the 

ED for about 1 day of generalized weakness with some shortness of breath and 

lower oxygen saturations tonight.  No recent fever, chest pain, or palpitations.

  He is hemodynamically stable here in the ED, afebrile, saturating 89% on room 

air, although he is also in atrial fibrillation, which tends to cause artifi

cially low SpO2 readings.  His physical exam is grossly unremarkable.  I have 

ordered a work-up that includes orthostatics, blood work, a urinalysis, a chest 

x-ray, an ECG, and a swab for the SARS-CoV-2 virus.








09/11/20 03:23


Two-view chest radiograph reviewed.  There is cardiomegaly, but no pulmonary 

vascular congestion or pleural effusion to suggest decompensated CHF.  No focal 

infiltrate.  No pneumothorax.  Mild thoracic scoliosis noted.  Formal read per 

the Radiologist pending.








09/11/20 03:48


The patient is orthostatic.





His CBC is remarkable for a Hgb slightly depressed at 13.2 with a Hct normal at 

40.1, and the remainder of his CBC being unremarkable.


His CMP is remarkable for a sodium slightly depressed at 133, a BUN/Cr elevated 

at 54/3.2, and a blood glucose elevated at 165, with the remainder of his CMP 

being unremarkable.


His magnesium level is within normal limits at 2.1.


His troponin is undetectably low.


His BNP is elevated at 3358.


His D-dimer is mildly elevated at 0.72.





A urine sample has been collected, however, the urinalysis has not yet resulted.





Reviewing prior labs, I see that the patient's BUN/Cr were 40/2.4 on 3/14/2020. 

 His BNP was 7002 on 3/14/2020, and 7547 on 12/28/2018.





Due to the patient's orthostasis and increased BUN/Cr, I will order an IV fluid 

bolus, however, given his history of CHF, I will limit it to only 500 mL instead

 of 1 L, to be followed by repeat orthostatics.








09/11/20 03:57


The patient's test for the SARS-CoV-2 virus has returned positive.





Based on this information, we will need to be judicious with IV fluid.








09/11/20 04:32


The patient's urinalysis is unremarkable.








09/11/20 05:25


Following a 500 ml bolus of IVF, repeat orthostatics are still positive.  I will

 therefore give the patient another 500 mL bolus of IVF, with the intention of 

admitting him to the hospital.  I will call the Hospitalist at 06:30.








09/11/20 06:52


Case discussed with Dr. Fitzgerald at 06:49.  She accepted the patient for 

admission to Med-Surg.  She will come by the ED to evaluate him.





Departure





- Departure


Time of Disposition: 06:53


Disposition: Admitted As Inpatient 65


Condition: Fair


Clinical Impression: 


 Orthostasis, COVID-19, Acute on chronic renal failure





CHF (congestive heart failure)


Qualifiers:


 Qualified Code(s): I50.33 - Acute on chronic diastolic (congestive) heart 

failure








- Discharge Information


*PRESCRIPTION DRUG MONITORING PROGRAM REVIEWED*: Not Applicable


*COPY OF PRESCRIPTION DRUG MONITORING REPORT IN PATIENT [facility]: Not Applicable


Referrals: 


Francis Blanca MD [Primary Care Provider] - 


Forms:  ED Department Discharge





Sepsis Event Note (ED)





- Evaluation


Sepsis Screening Result: No Definite Risk





- Focused Exam


Vital Signs: 


                                   Vital Signs











  Temp Pulse Resp BP Pulse Ox


 


 09/11/20 02:15  36.5 C  98  20  117/81  89 L














- My Orders


Last 24 Hours: 


My Active Orders





09/11/20 02:29


EKG Documentation Completion [RC] STAT 





09/11/20 02:30


Chest 2V [CR] Stat 





09/11/20 02:32


Orthostatic Vital Signs [RC] STAT 





09/11/20 03:54


Urinary Catheter Assessment [RC] ASDIRECTED 





09/11/20 03:56


Orthostatic Vital Signs [RC] STAT 





09/11/20 04:00


Urinary Catheter Insertion [Insert Urinary Catheter] [OM.PC] Q24H 














- Assessment/Plan


Last 24 Hours: 


My Active Orders





09/11/20 02:29


EKG Documentation Completion [RC] STAT 





09/11/20 02:30


Chest 2V [CR] Stat 





09/11/20 02:32


Orthostatic Vital Signs [RC] STAT 





09/11/20 03:54


Urinary Catheter Assessment [RC] ASDIRECTED 





09/11/20 03:56


Orthostatic Vital Signs [RC] STAT 





09/11/20 04:00


Urinary Catheter Insertion [Insert Urinary Catheter] [OM.PC] Q24H

## 2020-09-11 NOTE — PCM.HP.2
H&P History of Present Illness





- General


Date of Service: 09/11/20


Admit Problem/Dx: 


                           Admission Diagnosis/Problem





Admission Diagnosis/Problem      Hypoxia











- History of Present Illness


Initial Comments - Free Text/Narative: 





This is an 80-year-old gentleman with a past medical history of COPD, CHF, and 

chronic atrial fibrillation, who is now brought to the ED by EMS with a report 

of several months of a cough, with 1 day of generalized weakness, and decreased 

oxygen saturations, fluctuating between 97 and 92%, noted tonight.  Notes some 

shortness of breath for some time but not long.


Denies chest pain, palpitations, fever, abdominal pain, worsening cough, fever, 

chills, sore throat, congestion, nausea, vomiting, diarrhea, constipation, 

abdominal pain, urinary symptoms, weight changes, body aches or headaches. 


Of note patient is a resident of [place] where there have been + COVID cases 

in the past week. 





- Related Data


Allergies/Adverse Reactions: 


                                    Allergies











Allergy/AdvReac Type Severity Reaction Status Date / Time


 


No Known Allergies Allergy   Verified 03/14/20 13:13











Home Medications: 


                                    Home Meds





Aspirin [Halfprin] 81 mg PO DAILY #30 tab.ec 07/14/17 [Rx]


Ferrous Sulfate 325 mg PO DAILY #30 tablet 07/14/17 [Rx]


Acetaminophen [Tylenol] 650 mg PO Q4H PRN 04/15/18 [History]


Fluticasone Propionate [Flonase Allergy Relief] 1 spray NASBOTH DAILY 04/15/18 

[History]


Omeprazole 20 mg PO DAILY 04/15/18 [History]


Spironolactone [Aldactone] 25 mg PO QAM 04/15/18 [History]


atorvaSTATin [Lipitor] 40 mg PO BEDTIME 04/15/18 [History]


ondansetron HCL [Zofran] 4 mg PO Q6H 04/15/18 [History]


Metoprolol Succinate 100 mg PO QAM #0 12/28/18 [Rx]


Tamsulosin [Flomax] 0.8 mg PO QPM #60 cap.er 12/28/18 [Rx]


Albuterol/Ipratropium [DuoNeb 3.0-0.5 MG/3 ML] 3 ml INH Q4H PRN 09/11/20 

[History]


Aloe Vera/Sodium Chloride [Ayr Saline Nasal Gel] 1 applic RHONA TID PRN 09/11/20 

[History]


Budesonide/Formoterol [Symbicort 160-4.5 MCG] 2 puff INH BID 09/11/20 [History]


Calcium Alginate [Kaleb] 1 each .XX ASDIRECTED PRN 09/11/20 [History]


Cholecalciferol (Vitamin D3) [Vitamin D3] 1,000 unit PO DAILY 09/11/20 [History]


Docusate Sodium/Sennosides [Senna Plus] 1 each PO DAILY 09/11/20 [History]


Ethacrynic Acid 50 mg PO BID 09/11/20 [History]


Glimepiride [Amaryl] 2 mg PO DAILY 09/11/20 [History]


Insulin Glarg,Human.Rec.Analog [LantUS] 10 unit SQ BEDTIME 09/11/20 [History]


Rivaroxaban [Xarelto] 10 mg PO QPM 09/11/20 [History]


Sertraline [Zoloft] 50 mg PO BEDTIME 09/11/20 [History]


SitaGLIPtin [Januvia] 100 mg PO DAILY 09/11/20 [History]


calcitrioL [Calcitriol] 0.25 mcg PO MOFR 09/11/20 [History]











Past Medical History


HEENT History: Reports: Hard of Hearing (wears hearing aids), Impaired Vision


Cardiovascular History: Reports: Afib (chronic), Heart Failure, High 

Cholesterol, Hypertension


Respiratory History: Reports: COPD


Gastrointestinal History: Reports: Colon Polyp, GERD, Other (See Below) 

(Pancreatic cyst)


Other Gastrointestinal History: pancreatic cyst


Genitourinary History: Reports: BPH


Other Genitourinary History: bladder cancer


OB/GYN History: Reports: None


Musculoskeletal History: Reports: Back Pain, Chronic, Other (See Below)


Other Musculoskeletal History: torn cartilege to left knee


Neurological History: Reports: CVA, Migraines, TIA


Psychiatric History: Reports: Depression


Endocrine/Metabolic History: Reports: Diabetes, Type II


Hematologic History: Reports: None


Immunologic History: Reports: None


Oncologic (Cancer) History: Reports: Bladder


Dermatologic History: Reports: None





- Infectious Disease History


Infectious Disease History: Reports: Chicken Pox, Measles, Mumps, Shingles





- Past Surgical History


GI Surgical History: Reports: Appendectomy, Cholecystectomy, Colonoscopy, 

Hernia, Inguinal, Other (See Below) (Hemorroidectomy)


Male  Surgical History: Reports: TURP-Transurethral Resection of Prostate


Musculoskeletal Surgical History: Reports: Shoulder Surgery (left)





Social & Family History





- Family History


Family Medical History: Noncontributory





- Tobacco Use


Smoking Status *Q: Former Smoker


Years of Tobacco use: 51


Packs/Tins Daily: 1.5


Used Tobacco, but Quit: Yes


Month/Year Tobacco Last Used: Quit 2005





- Caffeine Use


Caffeine Use: Reports: Coffee





- Recreational Drug Use


Recreational Drug Use: No





- Living Situation & Occupation


Living situation: Reports: , Assisted Living ([place])


Occupation: Retired





H&P Review of Systems





- Review of Systems:


Review Of Systems: See Below


General: Reports: Malaise, Weakness, Fatigue.  Denies: Fever, Chills, Night 

Sweats, Diaphoresis, Decreased Appetite, Weight Loss, Weight Gain


HEENT: Reports: Hearing Changes.  Denies: Headaches, Rhinitis, Post Nasal Drip, 

Sinus Congestion, Sore Throat, Visual Changes


Pulmonary: Reports: Shortness of Breath.  Denies: Wheezing, Pleuritic Chest 

Pain, Cough, Sputum, Hemoptysis


Cardiovascular: Denies: Chest Pain, Palpitations, Dyspnea on Exertion, 

Orthopnea, PND, Edema, Lightheadedness, Syncope, Claudication, Blood Pressure 

Problem


Gastrointestinal: Denies: Abdominal Pain, Anorexia, Black Stool, Bloody Stool, 

Constipation, Diarrhea, Decreased Appetite, Difficulty Swallowing, Distension, 

Nausea, Stool Incontinence, Vomiting


Genitourinary: Reports: Frequency.  Denies: Dysuria, Burning, Pain, Urgency, 

Incontinence


Musculoskeletal: Denies: Joint Pain, Joint Swelling, Muscle Pain, Muscle 

Stiffness


Skin: Denies: Cyanosis, Jaundice, Mottled, Pallor, Diaphoresis


Psychiatric: Denies: Confusion, Depression, Mood Lability, Anxiety


Neurological: Denies: Dizziness, Headache, Numbness, Paresthesia





Exam





- Exam


Exam: See Below





- Vital Signs


Vital Signs: 


                                Last Vital Signs











Temp  97.7 F   09/11/20 02:15


 


Pulse  98   09/11/20 02:15


 


Resp  20   09/11/20 02:15


 


BP  117/81   09/11/20 02:15


 


Pulse Ox  89 L  09/11/20 02:15








Weight: 81.647 kg





- Exam


Quality Assessment: No: Supplemental Oxygen


General: Alert, Oriented, Cooperative.  No: Mild Distress


HEENT: Conjunctiva Clear, EACs Clear, Mucosa Moist & Pink, Nares Patent.  No: 

Hearing Intact


Neck: Supple, Trachea Midline, +2 Carotid Pulse wo Bruit, Full Range of Motion. 

 No: Lymphadenopathy


Lungs: Normal Respiratory Effort, Decreased Breath Sounds, Crackles.  No: Rales,

 Rhonchi, Rub, Stridor, Wheezing


Cardiovascular: Regular Rate, Regular Rhythm.  No: Systolic Murmur, Diastolic 

Murmur, Rubs, Gallop/S3, Gallop/S4


GI/Abdominal Exam: Normal Bowel Sounds, Soft, Non-Tender.  No: Distended, 

Guarding, Rigid, Rebound


Back Exam: Normal Inspection.  No: CVA Tenderness (L), CVA Tenderness (R), 

Paraspinal Tenderness, Vertebral Tenderness


Extremities: Normal Inspection, Non-Tender, No Pedal Edema, Normal Capillary 

Refill


Peripheral Pulses: 2+: Radial (L), Radial (R), Dorsalis Pedis (L), Dorsalis 

Pedis (R)


Skin: Warm, Dry, Intact


Psychiatric: Normal Affect, Normal Mood





- Patient Data


Result Diagrams: 


                                 09/11/20 02:55





                                 09/11/20 02:55





EKG INTERPRETATION


EKG Date: 09/11/20


Rhythm: A-Fib


Rate (Beats/Min): 84


Axis: LAD-Left Axis Deviation


P-Wave: Absent


QRS: Normal


ST-T: Normal


QT: Prolonged


Comparison: NA - No Prior EKG





Sepsis Event Note





- Evaluation


Sepsis Screening Result: No Definite Risk





- Problem List


(1) Atrial fibrillation with controlled ventricular rate


SNOMED Code(s): 02365300


   ICD Code: I48.91 - UNSPECIFIED ATRIAL FIBRILLATION   Status: Acute   Current 

Visit: Yes   





(2) Former smoker


SNOMED Code(s): 6213156


   ICD Code: Z87.891 - PERSONAL HISTORY OF NICOTINE DEPENDENCE   Status: Acute  

 Current Visit: Yes   





(3) Hypoalbuminemia


SNOMED Code(s): 902819691


   ICD Code: E88.09 - OTH DISORDERS OF PLASMA-PROTEIN METABOLISM, NEC   Status: 

Acute   Current Visit: Yes   





(4) Hyponatremia


SNOMED Code(s): 70230652


   ICD Code: E87.1 - HYPO-OSMOLALITY AND HYPONATREMIA   Status: Acute   Current 

Visit: Yes   





(5) Volume depletion


SNOMED Code(s): 978494032


   ICD Code: E86.9 - VOLUME DEPLETION, UNSPECIFIED   Status: Acute   Current 

Visit: Yes   





(6) Normocytic normochromic anemia


SNOMED Code(s): 60471842


   ICD Code: D64.9 - ANEMIA, UNSPECIFIED   Status: Acute   Current Visit: Yes   





(7) Acute on chronic renal failure


SNOMED Code(s): 819887458


   ICD Code: N17.9 - ACUTE KIDNEY FAILURE, UNSPECIFIED; N18.9 - CHRONIC KIDNEY 

DISEASE, UNSPECIFIED   Status: Acute   Current Visit: Yes   





(8) COPD (chronic obstructive pulmonary disease)


SNOMED Code(s): 77072708


   ICD Code: J44.9 - CHRONIC OBSTRUCTIVE PULMONARY DISEASE, UNSPECIFIED   St

atus: Acute   Current Visit: No   





(9) COVID-19


SNOMED Code(s): 144881080


   ICD Code: U07.1 - COVID-19   Status: Acute   Current Visit: Yes   





(10) Diabetes


SNOMED Code(s): 11243377


   ICD Code: E11.9 - TYPE 2 DIABETES MELLITUS WITHOUT COMPLICATIONS   Status: 

Acute   Current Visit: No   





(11) GERD (gastroesophageal reflux disease)


SNOMED Code(s): 695429291


   ICD Code: K21.9 - GASTRO-ESOPHAGEAL REFLUX DISEASE WITHOUT ESOPHAGITIS   

Status: Acute   Current Visit: No   





(12) Hyperlipidemia


SNOMED Code(s): 11303724


   ICD Code: E78.5 - HYPERLIPIDEMIA, UNSPECIFIED   Status: Acute   Current 

Visit: No   





(13) Hypertension


SNOMED Code(s): 41900835


   ICD Code: I10 - ESSENTIAL (PRIMARY) HYPERTENSION   Status: Acute   Current 

Visit: No   





(14) Depression


SNOMED Code(s): 60884841


   ICD Code: F32.9 - MAJOR DEPRESSIVE DISORDER, SINGLE EPISODE, UNSPECIFIED   

Status: Acute   Current Visit: Yes   





(15) Dyslipidemia


SNOMED Code(s): 929337430


   ICD Code: E78.5 - HYPERLIPIDEMIA, UNSPECIFIED   Status: Acute   Current 

Visit: Yes   


Problem List Initiated/Reviewed/Updated: Yes


Assessment/Plan Comment:: 








COVID-19


COPD (chronic obstructive pulmonary disease)


1 day of weakness and low O2 sats


Lives in [place], + COVID patients in past week


Pulse ox on admission 89%


CXR with cardiomegaly only, no lung parenchyma changes


Orthostatic --> given 500ml IVF bolus--> still orthostatic--> 2nd bolus


Lab results:


- Hb 13.2, Hct 40.1


- Na 133, GFR 19, glucose 165


- Troponin negative


- COVID positive


PLAN


- Start Remdesivir


- Start Dexamethasone


- PRN albuterol


- Continue Symbicort





Atrial fibrillation with controlled ventricular rate


Rate on admission 98


EKG with a. fib., rate 84


RVR will likely increase O2 needs


PLAN


- Hold metoprolol due to orthostatic hypotension


- Monitor for hypoxemia


 


Acute on chronic renal failure 2/2 Volume depletion


Orthostatic


Hyponatremia


Normocytic normochromic anemia


GFR baseline 26, 19 on admission


Not on any nephrotoxic meds 


Decreased oral intake


PLAN


- Monitor urine output


- Renally dosed medications


- Repeat labs and replace electrolytes as needed  





Hypertension


Admission /81


Home management with metoprolol


PLAN


- Hold metoprolol


- PRN Hydralazine





Diabetes mellitus, unknown HbA1c


Home management with glimepiride, sitagliptin and insulin


Glucose on admission 165


PLAN


- Continue home insulin at same dose for now


- No premeals, adjust with new measurements as needed


- New HbA1c


- Accu-checks before meals and bedtime


- Hypoglycemia protocol


- Hold PO meds





Gastroesophageal reflux disease


No acute issues


Home management with omeprazole


PLAN


- Continue home omeprazole





Dyslipidemia


No acute issues


PLAN


- Continue home statin





Hypoalbuminemia


Denies any acute weight loss


Pending weight confirmation


PLAN


- Dietary consult





Former smoker


Smoked 1.5 ppd for 51 years


Quit in 2005 





Depression


No SI or HI


PLAN


- Continue home sertraline





PROPHYLAXIS


DVT- home Xarelto


GI- home omeprazole





CODE STATUS: FULL CODE





DISPOSITION:


Patient will be admitted to med surg floor, will be started on Remdesivir and 

dexamethasone. 





SOCIAL:


Resident of [place]


PCP is Dr. Francis Blanca.


Previous smoker


Social drinker








- Mortality Measure


Prognosis:: Good

## 2020-09-12 NOTE — PCM.PN
- General Info


Date of Service: 09/12/20


Subjective Update: 





Slept well


Tolerating diet


No complaints


No reports from nursing








- Patient Data


Vitals - Most Recent: 


                                Last Vital Signs











Temp  97.7 F   09/12/20 13:02


 


Pulse  84   09/12/20 10:12


 


Resp  16   09/12/20 13:02


 


BP  151/89 H  09/12/20 13:02


 


Pulse Ox  97   09/12/20 08:06








Weight - Most Recent: 64.864 kg





- Exam


Quality Assessment: No: Supplemental Oxygen


General: Alert, Oriented, Cooperative, No Acute Distress


HEENT: Pupils Equal, Pupils Reactive, EOMI, Mucous Membr. Moist/Pink


Neck: Supple, Trachea Midline, No JVD, No Thyromegaly.  No: Lymphadenopathy


Lungs: Clear to Auscultation, Normal Respiratory Effort, Crackles (Very 

occasional).  No: Decreased Breath Sounds, Rales, Rhonchi, Rub, Stridor, 

Wheezing


Cardiovascular: Regular Rate, Regular Rhythm.  No: Murmurs, Gallops, Rubs


GI/Abdominal Exam: Normal Bowel Sounds, Soft, Non-Tender.  No: Distended, 

Guarding, Rigid, Rebound


Back Exam: Normal Inspection.  No: CVA Tenderness (L), CVA Tenderness (R), 

Paraspinal Tenderness, Vertebral Tenderness


Extremities: Normal Inspection, Normal Range of Motion, Non-Tender, Normal 

Capillary Refill, Pedal Edema (Trace)


Peripheral Pulses: 2+: Radial (L), Radial (R), Dorsalis Pedis (L), Dorsalis 

Pedis (R)


Skin: Warm, Dry, Intact


Neurological: No New Focal Deficit


Psy/Mental Status: Normal Affect, Normal Mood





Sepsis Event Note





- Evaluation


Sepsis Screening Result: No Definite Risk





- Problem List & Annotations


(1) Atrial fibrillation with controlled ventricular rate


SNOMED Code(s): 02907963


   Code(s): I48.91 - UNSPECIFIED ATRIAL FIBRILLATION   Status: Acute   Current 

Visit: Yes   





(2) Former smoker


SNOMED Code(s): 8392138


   Code(s): Z87.891 - PERSONAL HISTORY OF NICOTINE DEPENDENCE   Status: Acute   

Current Visit: Yes   





(3) Hypoalbuminemia


SNOMED Code(s): 999562162


   Code(s): E88.09 - OTH DISORDERS OF PLASMA-PROTEIN METABOLISM, NEC   Status: 

Acute   Current Visit: Yes   





(4) Hyponatremia


SNOMED Code(s): 87601388


   Code(s): E87.1 - HYPO-OSMOLALITY AND HYPONATREMIA   Status: Acute   Current 

Visit: Yes   





(5) Volume depletion


SNOMED Code(s): 215469358


   Code(s): E86.9 - VOLUME DEPLETION, UNSPECIFIED   Status: Acute   Current 

Visit: Yes   





(6) Normocytic normochromic anemia


SNOMED Code(s): 19957516


   Code(s): D64.9 - ANEMIA, UNSPECIFIED   Status: Acute   Current Visit: Yes   





(7) Acute on chronic renal failure


SNOMED Code(s): 990700621


   Code(s): N17.9 - ACUTE KIDNEY FAILURE, UNSPECIFIED; N18.9 - CHRONIC KIDNEY 

DISEASE, UNSPECIFIED   Status: Acute   Current Visit: Yes   





(8) COPD (chronic obstructive pulmonary disease)


SNOMED Code(s): 59780996


   Code(s): J44.9 - CHRONIC OBSTRUCTIVE PULMONARY DISEASE, UNSPECIFIED   Status:

Acute   Current Visit: No   





(9) COVID-19


SNOMED Code(s): 400662244


   Code(s): U07.1 - COVID-19   Status: Acute   Current Visit: Yes   





(10) Diabetes


SNOMED Code(s): 42731879


   Code(s): E11.9 - TYPE 2 DIABETES MELLITUS WITHOUT COMPLICATIONS   Status: 

Acute   Current Visit: No   





(11) GERD (gastroesophageal reflux disease)


SNOMED Code(s): 614012639


   Code(s): K21.9 - GASTRO-ESOPHAGEAL REFLUX DISEASE WITHOUT ESOPHAGITIS   

Status: Acute   Current Visit: No   





(12) Hyperlipidemia


SNOMED Code(s): 32686682


   Code(s): E78.5 - HYPERLIPIDEMIA, UNSPECIFIED   Status: Acute   Current Visit:

No   





(13) Hypertension


SNOMED Code(s): 79718426


   Code(s): I10 - ESSENTIAL (PRIMARY) HYPERTENSION   Status: Acute   Current 

Visit: No   





(14) Depression


SNOMED Code(s): 25789839


   Code(s): F32.9 - MAJOR DEPRESSIVE DISORDER, SINGLE EPISODE, UNSPECIFIED   

Status: Acute   Current Visit: Yes   





(15) Dyslipidemia


SNOMED Code(s): 280786784


   Code(s): E78.5 - HYPERLIPIDEMIA, UNSPECIFIED   Status: Acute   Current Visit:

Yes   





(16) B12 deficiency


SNOMED Code(s): 932733869


   Code(s): E53.8 - DEFICIENCY OF OTHER SPECIFIED B GROUP VITAMINS   Status: 

Acute   Current Visit: Yes   





(17) Iron deficiency


SNOMED Code(s): 79005810


   Code(s): E61.1 - IRON DEFICIENCY   Status: Acute   Current Visit: Yes   





- Problem List Review


Problem List Initiated/Reviewed/Updated: Yes





- Assessment


Assessment:: 





9/11/20


1 day of weakness and low O2 sats


Lives in [place], + COVID patients in past week


Pulse ox on admission 89%


CXR with cardiomegaly only, no lung parenchyma changes


Orthostatic --> given 500ml IVF bolus--> still orthostatic--> 2nd bolus


Lab results:


- Hb 13.2, Hct 40.1


- Na 133, GFR 19, glucose 165


- Troponin negative


Heart rate on admission 98


EKG with a. fib., rate 84


RVR will likely increase O2 needs- COVID positive


GFR baseline 26, 19 on admission


Not on any nephrotoxic meds 


Decreased oral intake


Admission /81


Hypertension home management with metoprolol


Diabetes home management with glimepiride, sitagliptin and insulin


Glucose on admission 165


Smoked 1.5 ppd for 51 years; Quit in 2005 


PLAN


- Start Remdesivir


- Start Dexamethasone


- PRN albuterol


- Continue Symbicort


- Hold metoprolol due to orthostatic hypotension


- Monitor for hypoxemia


- Monitor urine output


- Renally dosed medications


- Repeat labs and replace electrolytes as needed  


- Hold metoprolol


- PRN Hydralazine


- Continue home insulin at same dose for now


- No premeals, adjust with new measurements as needed


- New HbA1c


- Accu-checks before meals and bedtime


- Hypoglycemia protocol


- Hold PO meds


- Continue home omeprazole


- Continue home statin


- Dietary consult


- Continue home sertraline


Patient will be admitted to med surg floor, will be started on Remdesivir and 

dexamethasone. 





9/12/20


No longer on oxygen supplementation since 1:30 PM on 9/11/2020


Vital signs trend


Blood pressure: 117 264/50 3-91


T-max 98.2


Heart rate: 66-98


Pulse ox greater than 89%


Lab results


WBC down from 6.96-4.05


Hemoglobin down from 13.2-11.7


D-dimer stable from 0.72 2.71


Sodium down from 1 33-1 32


GFR up from 19-26


B12 87


BNP up from 2422-4335


CRP 5.2


Iron 26


Glucose -258


A1c 6.9%





- Plan


Plan:: 








COVID-19


COPD (chronic obstructive pulmonary disease)


-Continue Remdesivir day 2 out of 5


-Continue dexamethasone day 2 out of 10


-Continue Symbicort


-PRN albuterol


-Repeat labs as needed





Atrial fibrillation with controlled ventricular rate


-Continue to hold metoprolol


-Monitor for hypoxemia


 


Acute on chronic renal failure 2/2 Volume depletion, improved


Hyponatremia, stable


Iron deficiency anemia


B12 deficiency


-Monitor urine output


-Renally dosed medications


-Repeat labs and replace electrolytes as needed  





Hypertension


-Hold metoprolol


-PRN Hydralazine





Diabetes mellitus, PbQ7h-3.9%


-Continue home insulin at same dose for now


-Accu-checks before meals and bedtime


-Hypoglycemia protocol


-Hold PO meds





Gastroesophageal reflux disease


-Continue home omeprazole





Dyslipidemia


No acute issues


PLAN


- Continue home statin





Hypoalbuminemia


-Dietary consult





Depression


-Continue home sertraline





Orthostatic, resolved





PROPHYLAXIS


DVT- home Xarelto


GI- home omeprazole





CODE STATUS: FULL CODE





DISPOSITION:


Patient will remain admitted for antiviral treatment for COVID.


Length of stay at least 5 days due to length of treatment required for COVID.

## 2020-09-13 NOTE — PCM.PN
- General Info


Date of Service: 09/13/20


Subjective Update: 





Slept okay


Tolerating diet and eating well


Last bowel movement today








- Patient Data


Vitals - Most Recent: 


                                Last Vital Signs











Temp  97.7 F   09/13/20 09:13


 


Pulse  69   09/13/20 09:18


 


Resp  20   09/13/20 09:13


 


BP  150/65 H  09/13/20 09:18


 


Pulse Ox  96   09/13/20 08:00








Weight - Most Recent: 66.95 kg





- Exam


General: Alert, Oriented, Cooperative, No Acute Distress


HEENT: Pupils Equal, Pupils Reactive, EOMI, Mucous Membr. Moist/Pink


Neck: Supple, Trachea Midline, No JVD, No Thyromegaly, +2 Carotid Pulse wo Bruit


Lungs: Clear to Auscultation, Normal Respiratory Effort.  No: Decreased Breath 

Sounds, Crackles, Rales, Rhonchi, Rub, Stridor, Wheezing


Cardiovascular: Regular Rate, Regular Rhythm.  No: Murmurs, Gallops, Rubs


GI/Abdominal Exam: Normal Bowel Sounds, Soft, Non-Tender.  No: Distended, 

Guarding, Rigid, Rebound


Back Exam: Normal Inspection.  No: CVA Tenderness (L), CVA Tenderness (R), 

Paraspinal Tenderness, Vertebral Tenderness


Extremities: Normal Inspection, Normal Range of Motion, Non-Tender, No Pedal 

Edema, Normal Capillary Refill


Peripheral Pulses: 2+: Radial (L), Radial (R)


Skin: Warm, Dry, Intact


Neurological: No New Focal Deficit


Psy/Mental Status: Normal Affect, Normal Mood





Sepsis Event Note





- Evaluation


Sepsis Screening Result: No Definite Risk





- Problem List & Annotations


(1) Atrial fibrillation with controlled ventricular rate


SNOMED Code(s): 73457290


   Code(s): I48.91 - UNSPECIFIED ATRIAL FIBRILLATION   Status: Acute   Current 

Visit: Yes   





(2) Former smoker


SNOMED Code(s): 8885196


   Code(s): Z87.891 - PERSONAL HISTORY OF NICOTINE DEPENDENCE   Status: Acute   

Current Visit: Yes   





(3) Hypoalbuminemia


SNOMED Code(s): 609226400


   Code(s): E88.09 - OTH DISORDERS OF PLASMA-PROTEIN METABOLISM, NEC   Status: 

Acute   Current Visit: Yes   





(4) Hyponatremia


SNOMED Code(s): 52311574


   Code(s): E87.1 - HYPO-OSMOLALITY AND HYPONATREMIA   Status: Acute   Current 

Visit: Yes   





(5) Volume depletion


SNOMED Code(s): 607681189


   Code(s): E86.9 - VOLUME DEPLETION, UNSPECIFIED   Status: Acute   Current 

Visit: Yes   





(6) Normocytic normochromic anemia


SNOMED Code(s): 35295604


   Code(s): D64.9 - ANEMIA, UNSPECIFIED   Status: Acute   Current Visit: Yes   





(7) Acute on chronic renal failure


SNOMED Code(s): 206662120


   Code(s): N17.9 - ACUTE KIDNEY FAILURE, UNSPECIFIED; N18.9 - CHRONIC KIDNEY 

DISEASE, UNSPECIFIED   Status: Acute   Current Visit: Yes   





(8) COPD (chronic obstructive pulmonary disease)


SNOMED Code(s): 88321329


   Code(s): J44.9 - CHRONIC OBSTRUCTIVE PULMONARY DISEASE, UNSPECIFIED   Status:

Acute   Current Visit: No   





(9) COVID-19


SNOMED Code(s): 315075222


   Code(s): U07.1 - COVID-19   Status: Acute   Current Visit: Yes   





(10) Diabetes


SNOMED Code(s): 71583095


   Code(s): E11.9 - TYPE 2 DIABETES MELLITUS WITHOUT COMPLICATIONS   Status: 

Acute   Current Visit: No   





(11) GERD (gastroesophageal reflux disease)


SNOMED Code(s): 678126945


   Code(s): K21.9 - GASTRO-ESOPHAGEAL REFLUX DISEASE WITHOUT ESOPHAGITIS   Stat

us: Acute   Current Visit: No   





(12) Hyperlipidemia


SNOMED Code(s): 98227811


   Code(s): E78.5 - HYPERLIPIDEMIA, UNSPECIFIED   Status: Acute   Current Visit:

No   





(13) Hypertension


SNOMED Code(s): 19954765


   Code(s): I10 - ESSENTIAL (PRIMARY) HYPERTENSION   Status: Acute   Current 

Visit: No   





(14) Depression


SNOMED Code(s): 74988952


   Code(s): F32.9 - MAJOR DEPRESSIVE DISORDER, SINGLE EPISODE, UNSPECIFIED   

Status: Acute   Current Visit: Yes   





(15) Dyslipidemia


SNOMED Code(s): 332440569


   Code(s): E78.5 - HYPERLIPIDEMIA, UNSPECIFIED   Status: Acute   Current Visit:

Yes   





(16) B12 deficiency


SNOMED Code(s): 907729809


   Code(s): E53.8 - DEFICIENCY OF OTHER SPECIFIED B GROUP VITAMINS   Status: 

Acute   Current Visit: Yes   





(17) Iron deficiency


SNOMED Code(s): 13777285


   Code(s): E61.1 - IRON DEFICIENCY   Status: Acute   Current Visit: Yes   





- Problem List Review


Problem List Initiated/Reviewed/Updated: Yes





- Assessment


Assessment:: 





9/11/20


1 day of weakness and low O2 sats


Lives in [place], + COVID patients in past week


Pulse ox on admission 89%


CXR with cardiomegaly only, no lung parenchyma changes


Orthostatic --> given 500ml IVF bolus--> still orthostatic--> 2nd bolus


Lab results:


- Hb 13.2, Hct 40.1


- Na 133, GFR 19, glucose 165


- Troponin negative


Heart rate on admission 98


EKG with a. fib., rate 84


RVR will likely increase O2 needs- COVID positive


GFR baseline 26, 19 on admission


Not on any nephrotoxic meds 


Decreased oral intake


Admission /81


Hypertension home management with metoprolol


Diabetes home management with glimepiride, sitagliptin and insulin


Glucose on admission 165


Smoked 1.5 ppd for 51 years; Quit in 2005 


PLAN


- Start Remdesivir


- Start Dexamethasone


- PRN albuterol


- Continue Symbicort


- Hold metoprolol due to orthostatic hypotension


- Monitor for hypoxemia


- Monitor urine output


- Renally dosed medications


- Repeat labs and replace electrolytes as needed  


- Hold metoprolol


- PRN Hydralazine


- Continue home insulin at same dose for now


- No premeals, adjust with new measurements as needed


- New HbA1c


- Accu-checks before meals and bedtime


- Hypoglycemia protocol


- Hold PO meds


- Continue home omeprazole


- Continue home statin


- Dietary consult


- Continue home sertraline


Patient will be admitted to med surg floor, will be started on Remdesivir and 

dexamethasone. 





9/12/20


No longer on oxygen supplementation since 1:30 PM on 9/11/2020


Vital signs trend


Blood pressure: 117 264/50 3-91


T-max 98.2


Heart rate: 66-98


Pulse ox greater than 89%


Lab results


WBC down from 6.96-4.05


Hemoglobin down from 13.2-11.7


D-dimer stable from 0.72 2.71


Sodium down from 1 33-1 32


GFR up from 19-26


B12 87


BNP up from 5406-4151


CRP 5.2


Iron 26


Glucose -258


A1c 6.9%


PLAN


-Continue Remdesivir day 2 out of 5


-Continue dexamethasone day 2 out of 10


-Continue Symbicort


-PRN albuterol


-Repeat labs as needed


-Continue to hold metoprolol


-Monitor for hypoxemia


-Monitor urine output


-Renally dosed medications


-Repeat labs and replace electrolytes as needed  


-Hold metoprolol


-PRN Hydralazine


-Continue home insulin at same dose for now


-Accu-checks before meals and bedtime


-Hypoglycemia protocol


-Hold PO meds


-Continue home omeprazole


- Continue home statin


Patient will remain admitted for antiviral treatment for COVID.


Length of stay at least 5 days due to length of treatment required for COVID.





9/13/20


Shortness of breath has resolved


Has not required oxygen supplementation the past 24 hours


Vital signs trend


Blood pressure 510163/7289


T-max 98.4


Heart rate 66 to 88/min


Pulse ox greater than 97% on room air


New lab results


WBC up from 4.05-7.54


Hemoglobin up from 11.7-11.9


Platelets up from 193-235


D-dimer down from 0.71 2.5


Sodium stable from 1 


Chloride up from 94-96


GFR up from 26-30


Magnesium up from 1.9-2.1


CRP down from 5.2-2.8


Glucose trend 171-220





- Plan


Plan:: 








COVID-19


COPD (chronic obstructive pulmonary disease)


-Continue Remdesivir day 3 out of 5


-Continue dexamethasone day 3 out of 10


-Continue Symbicort


-PRN albuterol


-Repeat labs as needed





Atrial fibrillation with controlled ventricular rate


-Continue to hold metoprolol


-Monitor for hypoxemia


 


Acute on chronic renal failure 2/2 Volume depletion, improved


Hyponatremia, stable


Iron deficiency anemia


B12 deficiency


-Monitor urine output


-Renally dosed medications


-Repeat labs and replace electrolytes as needed  





Hypertension


-Hold metoprolol


-PRN Hydralazine





Diabetes mellitus, GfC1v-2.9%


-Continue home insulin at same dose for now


-Accu-checks before meals and bedtime


-Hypoglycemia protocol


-Hold PO meds





Gastroesophageal reflux disease


-Continue home omeprazole





Dyslipidemia


- Continue home statin





Hypoalbuminemia


-Dietary consult





Depression


-Continue home sertraline





Orthostatic, resolved





PROPHYLAXIS


DVT- home Xarelto


GI- home omeprazole





CODE STATUS: FULL CODE





DISPOSITION:


Patient will remain admitted for antiviral treatment for COVID.


Length of stay at least 2 more days due to length of treatment required for 

COVID.

## 2020-09-14 NOTE — PCM.PN
- General Info


Date of Service: 09/14/20


Admission Dx/Problem (Free Text): 


                           Admission Diagnosis/Problem





Admission Diagnosis/Problem      Hypoxia








Subjective Update: 





Patient is doing well.  Denies any shortness of breath today.  Appetite is 

fairly good.  He is off of oxygen.


Functional Status: Reports: Pain Controlled





- Review of Systems


General: Reports: No Symptoms


HEENT: Reports: No Symptoms


Pulmonary: Reports: No Symptoms


Gastrointestinal: Reports: No Symptoms





- Patient Data


Vitals - Most Recent: 


                                Last Vital Signs











Temp  97.7 F   09/14/20 15:45


 


Pulse  79   09/14/20 15:45


 


Resp  20   09/14/20 15:45


 


BP  159/90 H  09/14/20 15:45


 


Pulse Ox  95   09/14/20 15:45








                                        





Orthostatic Blood Pressure [     71/53


Standing]                        


Orthostatic Blood Pressure [     100/71


Sitting]                         


Orthostatic Blood Pressure [     121/52


Supine]                          








Weight - Most Recent: 66.406 kg


I&O - Last 24 Hours: 


                                 Intake & Output











 09/14/20 09/14/20 09/14/20





 06:59 14:59 22:59


 


Intake Total 200 360 460


 


Balance 200 360 460











Lab Results Last 24 Hours: 


                         Laboratory Results - last 24 hr











  09/13/20 09/14/20 09/14/20 Range/Units





  21:04 05:47 05:47 


 


WBC   8.74   (4.23-9.07)  K/mm3


 


RBC   4.06 L   (4.63-6.08)  M/mm3


 


Hgb   12.0 L   (13.7-17.5)  gm/dl


 


Hct   36.2 L   (40.1-51.0)  %


 


MCV   89.2   (79.0-92.2)  fl


 


MCH   29.6   (25.7-32.2)  pg


 


MCHC   33.1   (32.2-35.5)  g/dl


 


RDW Std Deviation   43.7   (35.1-43.9)  fL


 


Plt Count   242   (163-337)  K/mm3


 


MPV   9.1 L   (9.4-12.3)  fl


 


Neutrophils % (Manual)   85 H   (40-60)  %


 


Band Neutrophils %   0   (0-10)  %


 


Lymphocytes % (Manual)   8 L   (20-40)  %


 


Atypical Lymphs %   0   %


 


Monocytes % (Manual)   7   (2-10)  %


 


Eosinophils % (Manual)   0 L   (0.8-7.0)  %


 


Basophils % (Manual)   0 L   (0.2-1.2)  


 


Platelet Estimate   Adequate   


 


RBC Morph Comment   Normal   


 


Sodium    136  (136-145)  mEq/L


 


Potassium    4.2  (3.5-5.1)  mEq/L


 


Chloride    100  ()  mEq/L


 


Carbon Dioxide    25  (21-32)  mEq/L


 


Anion Gap    15.2 H  (5-15)  


 


BUN    63 H  (7-18)  mg/dL


 


Creatinine    2.1 H  (0.7-1.3)  mg/dL


 


Est Cr Clr Drug Dosing    24.90  mL/min


 


Estimated GFR (MDRD)    30  (>60)  mL/min


 


BUN/Creatinine Ratio    30.0 H  (14-18)  


 


Glucose    165 H  ()  mg/dL


 


POC Glucose  281 H    ()  mg/dL


 


Calcium    8.7  (8.5-10.1)  mg/dL


 


Phosphorus    3.9  (2.6-4.7)  mg/dL


 


Magnesium    2.0  (1.8-2.4)  mg/dl


 


C-Reactive Protein    2.0 H*  (<1.0)  mg/dL














  09/14/20 09/14/20 Range/Units





  06:31 11:16 


 


WBC    (4.23-9.07)  K/mm3


 


RBC    (4.63-6.08)  M/mm3


 


Hgb    (13.7-17.5)  gm/dl


 


Hct    (40.1-51.0)  %


 


MCV    (79.0-92.2)  fl


 


MCH    (25.7-32.2)  pg


 


MCHC    (32.2-35.5)  g/dl


 


RDW Std Deviation    (35.1-43.9)  fL


 


Plt Count    (163-337)  K/mm3


 


MPV    (9.4-12.3)  fl


 


Neutrophils % (Manual)    (40-60)  %


 


Band Neutrophils %    (0-10)  %


 


Lymphocytes % (Manual)    (20-40)  %


 


Atypical Lymphs %    %


 


Monocytes % (Manual)    (2-10)  %


 


Eosinophils % (Manual)    (0.8-7.0)  %


 


Basophils % (Manual)    (0.2-1.2)  


 


Platelet Estimate    


 


RBC Morph Comment    


 


Sodium    (136-145)  mEq/L


 


Potassium    (3.5-5.1)  mEq/L


 


Chloride    ()  mEq/L


 


Carbon Dioxide    (21-32)  mEq/L


 


Anion Gap    (5-15)  


 


BUN    (7-18)  mg/dL


 


Creatinine    (0.7-1.3)  mg/dL


 


Est Cr Clr Drug Dosing    mL/min


 


Estimated GFR (MDRD)    (>60)  mL/min


 


BUN/Creatinine Ratio    (14-18)  


 


Glucose    ()  mg/dL


 


POC Glucose  152 H  303 H  ()  mg/dL


 


Calcium    (8.5-10.1)  mg/dL


 


Phosphorus    (2.6-4.7)  mg/dL


 


Magnesium    (1.8-2.4)  mg/dl


 


C-Reactive Protein    (<1.0)  mg/dL











Med Orders - Current: 


                               Current Medications





Acetaminophen (Tylenol)  325 mg PO Q4H PRN


   PRN Reason: Pain (Mild 1-3)/fever


Albuterol/Ipratropium (Duoneb 3.0-0.5 Mg/3 Ml)  3 ml INH Q4H PRN


   PRN Reason: Shortness of Breath


Aspirin (Halfprin)  81 mg PO DAILY [facility]


   Last Admin: 09/14/20 09:56 Dose:  81 mg


   Documented by: 


Calcitriol (Rocaltrol)  0.25 mcg PO MOFR [facility]


   Last Admin: 09/14/20 10:43 Dose:  0.25 mcg


   Documented by: 


Cholecalciferol (Vitamin D3)  25 mcg PO DAILY [facility]


   Last Admin: 09/14/20 09:56 Dose:  25 mcg


   Documented by: 


Dexamethasone (Dexamethasone)  6 mg IVPUSH Q24H [facility]


   Stop: 09/20/20 13:01


   Last Admin: 09/14/20 12:05 Dose:  6 mg


   Documented by: 


Dextrose/Water (Dextrose 50% In Water)  50 ml IVPUSH ASDIRECTED PRN


   PRN Reason: Hypoglycemia


Ferrous Sulfate (Ferrous Sulfate)  324 mg PO Q48H [facility]


   Last Admin: 09/14/20 09:56 Dose:  324 mg


   Documented by: 


Fluticasone Propionate (Flonase)  0 gm NASBOTH DAILY [facility]


   Last Admin: 09/14/20 09:56 Dose:  1 spray


   Documented by: 


Remdesivir 100 mg/ Sodium (Chloride)  100 mls @ 100 mls/hr IV Q24H [facility]


   Stop: 09/15/20 13:59


   Last Admin: 09/14/20 12:09 Dose:  100 mls/hr


   Documented by: 


Insulin Glargine (Lantus)  10 unit SUBCUT BEDTIME [facility]


   Last Admin: 09/13/20 21:08 Dose:  10 units


   Documented by: 


Insulin Human Lispro (Humalog)  0 unit SUBCUT QIDACANDBED [facility]; Protocol


   Last Admin: 09/14/20 16:49 Dose:  Not Given


   Documented by: 


Metoprolol Succinate (Toprol Xl)  100 mg PO DAILY [facility]


   Last Admin: 09/14/20 09:50 Dose:  100 mg


   Documented by: 


Mometasone Furoate/Formoterol Fumar (Dulera 200-5 Mcg)  2 puff IH BID [facility]


   Last Admin: 09/14/20 08:05 Dose:  2 puff


   Documented by: 


Ethacrynic Acid 50 (Mg **Ptom)  50 mg PO BID [facility]


   Last Admin: 09/14/20 09:56 Dose:  Not Given


   Documented by: 


Ondansetron HCl (Zofran)  4 mg IV Q6H PRN


   PRN Reason: Nausea/Vomiting


   Last Admin: 09/12/20 11:18 Dose:  4 mg


   Documented by: 


Pantoprazole Sodium (Protonix***)  40 mg PO DAILY [facility]


   Last Admin: 09/14/20 09:56 Dose:  40 mg


   Documented by: 


Rivaroxaban (Xarelto)  10 mg PO BEDTIME [facility]


   Last Admin: 09/13/20 21:07 Dose:  10 mg


   Documented by: 


Rosuvastatin Calcium (Crestor)  10 mg PO BEDTIME [facility]


   Last Admin: 09/13/20 21:07 Dose:  10 mg


   Documented by: 


Senna/Docusate Sodium (Senna Plus)  1 tab PO DAILY [facility]


   Last Admin: 09/14/20 09:50 Dose:  1 tab


   Documented by: 


Sertraline HCl (Zoloft)  50 mg PO BEDTIME [facility]


   Last Admin: 09/13/20 21:07 Dose:  50 mg


   Documented by: 


Sodium Chloride ([place] Saline Nasal Gel)  0 gm RHONA TID PRN


   PRN Reason: Dryness


Spironolactone (Aldactone)  25 mg PO DAILY [facility]


   Last Admin: 09/14/20 09:50 Dose:  25 mg


   Documented by: 


Tamsulosin HCl (Flomax)  0.8 mg PO BEDTIME [facility]


   Last Admin: 09/13/20 21:07 Dose:  0.8 mg


   Documented by: 





Discontinued Medications





Calcitriol (Rocaltrol)  0.25 mcg PO ONETIME ONE


   Stop: 09/11/20 13:01


   Last Admin: 09/11/20 13:09 Dose:  0.25 mcg


   Documented by: 


Sodium Chloride (Normal Saline)  500 mls @ 1,000 mls/hr IV .BOLUS ONE


   Stop: 09/11/20 04:18


   Last Admin: 09/11/20 04:05 Dose:  1,000 mls/hr


   Documented by: 


Sodium Chloride (Normal Saline)  500 mls @ 1,000 mls/hr IV .BOLUS ONE


   Stop: 09/11/20 05:56


   Last Admin: 09/11/20 05:34 Dose:  1,000 mls/hr


   Documented by: 


Remdesivir 200 mg/ Sodium (Chloride)  250 mls @ 250 mls/hr IV ONETIME ONE


   Stop: 09/11/20 13:59


   Last Admin: 09/11/20 13:05 Dose:  250 mls/hr


   Documented by: 











- Exam


Quality Assessment: No: Supplemental Oxygen


General: Alert, Oriented


HEENT: Pupils Equal, Mucous Membr. Moist/Pink


Neck: Supple


Lungs: Clear to Auscultation, Normal Respiratory Effort


Cardiovascular: Irregular Rhythm (Irregular rate)


GI/Abdominal Exam: Normal Bowel Sounds, Soft, Non-Tender, No Distention, No 

Abnormal Bruit


Extremities: Normal Inspection, Normal Range of Motion, Non-Tender, No Pedal 

Edema, Normal Capillary Refill


Peripheral Pulses: 1+: Posterior Tibial (L), Posterior Tibial (R), Dorsalis 

Pedis (L), Dorsalis Pedis (R)


Skin: Warm, Dry, Intact


Psy/Mental Status: Alert, Normal Affect, Normal Mood





Sepsis Event Note





- Evaluation


Sepsis Screening Result: No Definite Risk





- Focused Exam


Vital Signs: 


                                   Vital Signs











  Temp Pulse Pulse Resp BP Pulse Ox Pulse Ox


 


 09/14/20 15:45  97.7 F  79   20  159/90 H  95 


 


 09/14/20 12:04      144/96 H  


 


 09/14/20 12:03      151/98 H  


 


 09/14/20 11:15  97.3 F    20  162/73 H  


 


 09/14/20 09:55  97.9 F      


 


 09/14/20 09:53      132/86  


 


 09/14/20 09:50   76    132/86  


 


 09/14/20 09:48    76    96 


 


 09/14/20 08:05        90 L


 


 09/14/20 07:56     20  164/84 H  














- Problem List Review


Problem List Initiated/Reviewed/Updated: Yes





- My Orders


Last 24 Hours: 


My Active Orders





09/14/20 10:26


PT Evaluation and Treatment [CONS] Routine 





09/14/20 10:27


OT Evaluation and Treatment [CONS] Routine 





09/14/20 11:31


Insulin Lispro [HumaLOG]   See Protocol  SUBCUT QIDACANDBED 





09/14/20 11:34


Patient Status [ADT] Routine 














- Assessment


Assessment:: 





9/11/20


1 day of weakness and low O2 sats


Lives in [place], + COVID patients in past week


Pulse ox on admission 89%


CXR with cardiomegaly only, no lung parenchyma changes


Orthostatic --> given 500ml IVF bolus--> still orthostatic--> 2nd bolus


Lab results:


- Hb 13.2, Hct 40.1


- Na 133, GFR 19, glucose 165


- Troponin negative


Heart rate on admission 98


EKG with a. fib., rate 84


RVR will likely increase O2 needs- COVID positive


GFR baseline 26, 19 on admission


Not on any nephrotoxic meds 


Decreased oral intake


Admission /81


Hypertension home management with metoprolol


Diabetes home management with glimepiride, sitagliptin and insulin


Glucose on admission 165


Smoked 1.5 ppd for 51 years; Quit in 2005 


PLAN


- Start Remdesivir


- Start Dexamethasone


- PRN albuterol


- Continue Symbicort


- Hold metoprolol due to orthostatic hypotension


- Monitor for hypoxemia


- Monitor urine output


- Renally dosed medications


- Repeat labs and replace electrolytes as needed  


- Hold metoprolol


- PRN Hydralazine


- Continue home insulin at same dose for now


- No premeals, adjust with new measurements as needed


- New HbA1c


- Accu-checks before meals and bedtime


- Hypoglycemia protocol


- Hold PO meds


- Continue home omeprazole


- Continue home statin


- Dietary consult


- Continue home sertraline


Patient will be admitted to med surg floor, will be started on Remdesivir and 

dexamethasone. 





9/12/20


No longer on oxygen supplementation since 1:30 PM on 9/11/2020


Vital signs trend


Blood pressure: 117 264/50 3-91


T-max 98.2


Heart rate: 66-98


Pulse ox greater than 89%


Lab results


WBC down from 6.96-4.05


Hemoglobin down from 13.2-11.7


D-dimer stable from 0.72 2.71


Sodium down from 1 33-1 32


GFR up from 19-26


B12 87


BNP up from 0263-6069


CRP 5.2


Iron 26


Glucose -258


A1c 6.9%


PLAN


-Continue Remdesivir day 2 out of 5


-Continue dexamethasone day 2 out of 10


-Continue Symbicort


-PRN albuterol


-Repeat labs as needed


-Continue to hold metoprolol


-Monitor for hypoxemia


-Monitor urine output


-Renally dosed medications


-Repeat labs and replace electrolytes as needed  


-Hold metoprolol


-PRN Hydralazine


-Continue home insulin at same dose for now


-Accu-checks before meals and bedtime


-Hypoglycemia protocol


-Hold PO meds


-Continue home omeprazole


- Continue home statin


Patient will remain admitted for antiviral treatment for COVID.


Length of stay at least 5 days due to length of treatment required for COVID.





9/13/20


Shortness of breath has resolved


Has not required oxygen supplementation the past 24 hours


Vital signs trend


Blood pressure 476981/7289


T-max 98.4


Heart rate 66 to 88/min


Pulse ox greater than 97% on room air


New lab results


WBC up from 4.05-7.54


Hemoglobin up from 11.7-11.9


Platelets up from 193-235


D-dimer down from 0.71 2.5


Sodium stable from 1 


Chloride up from 94-96


GFR up from 26-30


Magnesium up from 1.9-2.1


CRP down from 5.2-2.8


Glucose trend 171-220





9/14/2020


Patient is asymptomatic.


Vital signs are stable and pulse ox is in the mid to upper 90s on room air.


CRP 2.0 and normal d-dimer.


Sodium up to 136.


BUN to creatinine ratio is still elevated at 30.0.


Estimated GFR is 30 and stable.  This is his chronic stable state.


Episode of blood sugar of 300 today.


Started sliding scale insulin.





- Plan


Plan:: 








COVID-19


COPD (chronic obstructive pulmonary disease)


-Continue Remdesivir day 4 out of 5


-Continue dexamethasone day 4 out of 10


-Continue Symbicort


-PRN albuterol


-Repeat labs as needed





Atrial fibrillation with controlled ventricular rate


-Continue to hold metoprolol


-Monitor for hypoxemia


 


Acute on chronic renal failure 2/2 Volume depletion, improved


Hyponatremia, stable


Iron deficiency anemia


B12 deficiency


-Monitor urine output


-Renally dosed medications


-Repeat labs and replace electrolytes as needed  





Hypertension


-Hold metoprolol


-PRN Hydralazine





Diabetes mellitus, UyQ5m-3.9%


-Continue home insulin at same dose for now


-Accu-checks before meals and bedtime


-Hypoglycemia protocol


-Hold PO meds


-Start sliding scale insulin


-Expect continued increase in blood sugars secondary to dexamethasone.





Gastroesophageal reflux disease


-Continue home omeprazole





Dyslipidemia


- Continue home statin





Hypoalbuminemia


-Dietary consult





Depression


-Continue home sertraline





Orthostatic, resolved





PROPHYLAXIS


DVT- home Xarelto


GI- home omeprazole





CODE STATUS: FULL CODE





DISPOSITION:


Patient will remain admitted for antiviral treatment for COVID.


Length of stay greater than 96 hours secondary to COVID-19 and remdesivir 

treatment requiring 5 days.

## 2020-09-15 NOTE — PCM.PN
- General Info


Date of Service: 09/15/20


Admission Dx/Problem (Free Text): 


                           Admission Diagnosis/Problem





Admission Diagnosis/Problem      Hypoxia








Subjective Update: 





Bennett is feeling back to his baseline.  Appetite is good.  We are now waiting 

for placement.


Functional Status: Reports: Pain Controlled





- Review of Systems


General: Reports: No Symptoms


HEENT: Reports: No Symptoms


Pulmonary: Reports: No Symptoms


Cardiovascular: Reports: No Symptoms


Musculoskeletal: Reports: No Symptoms





- Patient Data


Vitals - Most Recent: 


                                Last Vital Signs











Temp  97.5 F   09/15/20 10:49


 


Pulse  69   09/15/20 10:49


 


Resp  20   09/15/20 10:49


 


BP  168/89 H  09/15/20 10:49


 


Pulse Ox  100   09/15/20 10:49








                                        





Orthostatic Blood Pressure [     71/53


Standing]                        


Orthostatic Blood Pressure [     100/71


Sitting]                         


Orthostatic Blood Pressure [     121/52


Supine]                          








Weight - Most Recent: 65.771 kg


I&O - Last 24 Hours: 


                                 Intake & Output











 09/15/20 09/15/20 09/15/20





 06:59 14:59 22:59


 


Intake Total 200 300 


 


Balance 200 300 











Lab Results Last 24 Hours: 


                         Laboratory Results - last 24 hr











  09/13/20 09/14/20 09/14/20 Range/Units





  09:30 16:48 20:55 


 


POC Glucose   141 H  357 H  ()  mg/dL


 


Procalcitonin  <0.05    (<0.10)  ng/mL














  09/15/20 09/15/20 Range/Units





  05:52 11:17 


 


POC Glucose  152 H  146 H  ()  mg/dL


 


Procalcitonin    (<0.10)  ng/mL











Med Orders - Current: 


                               Current Medications





Acetaminophen (Tylenol)  325 mg PO Q4H PRN


   PRN Reason: Pain (Mild 1-3)/fever


Albuterol/Ipratropium (Duoneb 3.0-0.5 Mg/3 Ml)  3 ml INH Q4H PRN


   PRN Reason: Shortness of Breath


Aspirin (Halfprin)  81 mg PO DAILY [facility]


   Last Admin: 09/15/20 09:50 Dose:  81 mg


   Documented by: 


Calcitriol (Rocaltrol)  0.25 mcg PO MOFR [facility]


   Last Admin: 09/14/20 10:43 Dose:  0.25 mcg


   Documented by: 


Cholecalciferol (Vitamin D3)  25 mcg PO DAILY [facility]


   Last Admin: 09/15/20 09:50 Dose:  25 mcg


   Documented by: 


Dexamethasone (Dexamethasone)  6 mg PO Q24H [facility]


   Stop: 09/20/20 13:01


   Last Admin: 09/15/20 12:35 Dose:  6 mg


   Documented by: 


Dextrose/Water (Dextrose 50% In Water)  50 ml IVPUSH ASDIRECTED PRN


   PRN Reason: Hypoglycemia


Ferrous Sulfate (Ferrous Sulfate)  324 mg PO Q48H [facility]


   Last Admin: 09/14/20 09:56 Dose:  324 mg


   Documented by: 


Fluticasone Propionate (Flonase)  0 gm NASBOTH DAILY [facility]


   Last Admin: 09/15/20 09:49 Dose:  1 spray


   Documented by: 


Insulin Glargine (Lantus)  10 unit SUBCUT BEDTIME [facility]


   Last Admin: 09/14/20 21:42 Dose:  10 units


   Documented by: 


Insulin Human Lispro (Humalog)  0 unit SUBCUT QIDACANDBED [facility]; Protocol


   Last Admin: 09/15/20 11:48 Dose:  Not Given


   Documented by: 


Metoprolol Succinate (Toprol Xl)  100 mg PO DAILY [facility]


   Last Admin: 09/15/20 09:50 Dose:  100 mg


   Documented by: 


Mometasone Furoate/Formoterol Fumar (Dulera 200-5 Mcg)  2 puff IH BID [facility]


   Last Admin: 09/15/20 09:01 Dose:  2 puff


   Documented by: 


Ethacrynic Acid 50 (Mg **Ptom)  50 mg PO BID [facility]


   Last Admin: 09/15/20 09:54 Dose:  Not Given


   Documented by: 


Ondansetron HCl (Zofran)  4 mg IV Q6H PRN


   PRN Reason: Nausea/Vomiting


   Last Admin: 09/12/20 11:18 Dose:  4 mg


   Documented by: 


Pantoprazole Sodium (Protonix***)  40 mg PO DAILY [facility]


   Last Admin: 09/15/20 09:50 Dose:  40 mg


   Documented by: 


Rivaroxaban (Xarelto)  10 mg PO BEDTIME [facility]


   Last Admin: 09/14/20 21:44 Dose:  10 mg


   Documented by: 


Rosuvastatin Calcium (Crestor)  10 mg PO BEDTIME [facility]


   Last Admin: 09/14/20 21:44 Dose:  10 mg


   Documented by: 


Senna/Docusate Sodium (Senna Plus)  1 tab PO DAILY [facility]


   Last Admin: 09/15/20 09:50 Dose:  1 tab


   Documented by: 


Sertraline HCl (Zoloft)  50 mg PO BEDTIME [facility]


   Last Admin: 09/14/20 21:44 Dose:  50 mg


   Documented by: 


Sodium Chloride (Ayr Saline Nasal Gel)  0 gm RHONA TID PRN


   PRN Reason: Dryness


Spironolactone (Aldactone)  25 mg PO DAILY [facility]


   Last Admin: 09/15/20 09:50 Dose:  25 mg


   Documented by: 


Tamsulosin HCl (Flomax)  0.8 mg PO BEDTIME [facility]


   Last Admin: 09/14/20 21:44 Dose:  0.8 mg


   Documented by: 





Discontinued Medications





Calcitriol (Rocaltrol)  0.25 mcg PO ONETIME ONE


   Stop: 09/11/20 13:01


   Last Admin: 09/11/20 13:09 Dose:  0.25 mcg


   Documented by: 


Dexamethasone (Dexamethasone)  6 mg IVPUSH Q24H [facility]


   Stop: 09/20/20 13:01


   Last Admin: 09/14/20 12:05 Dose:  6 mg


   Documented by: 


Sodium Chloride (Normal Saline)  500 mls @ 1,000 mls/hr IV .BOLUS ONE


   Stop: 09/11/20 04:18


   Last Admin: 09/11/20 04:05 Dose:  1,000 mls/hr


   Documented by: 


Sodium Chloride (Normal Saline)  500 mls @ 1,000 mls/hr IV .BOLUS ONE


   Stop: 09/11/20 05:56


   Last Admin: 09/11/20 05:34 Dose:  1,000 mls/hr


   Documented by: 


Remdesivir 100 mg/ Sodium (Chloride)  100 mls @ 100 mls/hr IV Q24H [facility]


   Stop: 09/15/20 13:59


   Last Admin: 09/15/20 12:38 Dose:  100 mls/hr


   Documented by: 


Remdesivir 200 mg/ Sodium (Chloride)  250 mls @ 250 mls/hr IV ONETIME ONE


   Stop: 09/11/20 13:59


   Last Admin: 09/11/20 13:05 Dose:  250 mls/hr


   Documented by: 











- Exam


Quality Assessment: No: Supplemental Oxygen


General: Alert, Oriented


HEENT: Pupils Equal, Mucous Membr. Moist/Pink


Neck: Supple


Lungs: Clear to Auscultation, Normal Respiratory Effort


Cardiovascular: Regular Rate, Regular Rhythm


GI/Abdominal Exam: Normal Bowel Sounds, Soft, Non-Tender, No Distention


Extremities: Normal Inspection, Non-Tender, No Pedal Edema, Normal Capillary 

Refill


Skin: Warm, Dry, Intact


Psy/Mental Status: Alert, Normal Affect, Normal Mood





Sepsis Event Note





- Evaluation


Sepsis Screening Result: No Definite Risk





- Focused Exam


Vital Signs: 


                                   Vital Signs











  Temp Pulse Resp BP Pulse Ox Pulse Ox


 


 09/15/20 10:49  97.5 F  69  20  168/89 H  100 


 


 09/15/20 09:50   62   130/99 H  


 


 09/15/20 09:44   62    99 


 


 09/15/20 09:37     130/99 H  


 


 09/15/20 09:33  97.7 F  91  18  176/111 H  91 L 


 


 09/15/20 09:03       95


 


 09/15/20 08:03   71  14  145/88 H  92 L 


 


 09/15/20 03:47  97.5 F  71  16  153/90 H  93 L 














- Problem List & Annotations


(1) Acute on chronic renal failure


SNOMED Code(s): 000791265


   Code(s): N17.9 - ACUTE KIDNEY FAILURE, UNSPECIFIED; N18.9 - CHRONIC KIDNEY 

DISEASE, UNSPECIFIED   Status: Acute   Current Visit: Yes   





(2) Atrial fibrillation with controlled ventricular rate


SNOMED Code(s): 80644254


   Code(s): I48.91 - UNSPECIFIED ATRIAL FIBRILLATION   Status: Acute   Current 

Visit: Yes   





(3) B12 deficiency


SNOMED Code(s): 419486312


   Code(s): E53.8 - DEFICIENCY OF OTHER SPECIFIED B GROUP VITAMINS   Status: 

Acute   Current Visit: Yes   





(4) COVID-19


SNOMED Code(s): 162307950


   Code(s): U07.1 - COVID-19   Status: Acute   Current Visit: Yes   





(5) Congestive heart failure


SNOMED Code(s): 43420590


   Code(s): I50.9 - HEART FAILURE, UNSPECIFIED   Status: Acute   Current Visit: 

Yes   


Qualifiers: 


   Qualified Code(s): I50.33 - Acute on chronic diastolic (congestive) heart 

failure   





- Problem List Review


Problem List Initiated/Reviewed/Updated: Yes





- My Orders


Last 24 Hours: 


My Active Orders





09/15/20 13:00


dexAMETHasone   6 mg PO Q24H 














- Assessment


Assessment:: 





9/11/20


1 day of weakness and low O2 sats


Lives in [place], + COVID patients in past week


Pulse ox on admission 89%


CXR with cardiomegaly only, no lung parenchyma changes


Orthostatic --> given 500ml IVF bolus--> still orthostatic--> 2nd bolus


Lab results:


- Hb 13.2, Hct 40.1


- Na 133, GFR 19, glucose 165


- Troponin negative


Heart rate on admission 98


EKG with a. fib., rate 84


RVR will likely increase O2 needs- COVID positive


GFR baseline 26, 19 on admission


Not on any nephrotoxic meds 


Decreased oral intake


Admission /81


Hypertension home management with metoprolol


Diabetes home management with glimepiride, sitagliptin and insulin


Glucose on admission 165


Smoked 1.5 ppd for 51 years; Quit in 2005 


PLAN


- Start Remdesivir


- Start Dexamethasone


- PRN albuterol


- Continue Symbicort


- Hold metoprolol due to orthostatic hypotension


- Monitor for hypoxemia


- Monitor urine output


- Renally dosed medications


- Repeat labs and replace electrolytes as needed  


- Hold metoprolol


- PRN Hydralazine


- Continue home insulin at same dose for now


- No premeals, adjust with new measurements as needed


- New HbA1c


- Accu-checks before meals and bedtime


- Hypoglycemia protocol


- Hold PO meds


- Continue home omeprazole


- Continue home statin


- Dietary consult


- Continue home sertraline


Patient will be admitted to med surg floor, will be started on Remdesivir and 

dexamethasone. 





9/12/20


No longer on oxygen supplementation since 1:30 PM on 9/11/2020


Vital signs trend


Blood pressure: 117 264/50 3-91


T-max 98.2


Heart rate: 66-98


Pulse ox greater than 89%


Lab results


WBC down from 6.96-4.05


Hemoglobin down from 13.2-11.7


D-dimer stable from 0.72 2.71


Sodium down from 1 33-1 32


GFR up from 19-26


B12 87


BNP up from 7257-7506


CRP 5.2


Iron 26


Glucose -258


A1c 6.9%


PLAN


-Continue Remdesivir day 2 out of 5


-Continue dexamethasone day 2 out of 10


-Continue Symbicort


-PRN albuterol


-Repeat labs as needed


-Continue to hold metoprolol


-Monitor for hypoxemia


-Monitor urine output


-Renally dosed medications


-Repeat labs and replace electrolytes as needed  


-Hold metoprolol


-PRN Hydralazine


-Continue home insulin at same dose for now


-Accu-checks before meals and bedtime


-Hypoglycemia protocol


-Hold PO meds


-Continue home omeprazole


- Continue home statin


Patient will remain admitted for antiviral treatment for COVID.


Length of stay at least 5 days due to length of treatment required for COVID.





9/13/20


Shortness of breath has resolved


Has not required oxygen supplementation the past 24 hours


Vital signs trend


Blood pressure 445551/7289


T-max 98.4


Heart rate 66 to 88/min


Pulse ox greater than 97% on room air


New lab results


WBC up from 4.05-7.54


Hemoglobin up from 11.7-11.9


Platelets up from 193-235


D-dimer down from 0.71 2.5


Sodium stable from 1 


Chloride up from 94-96


GFR up from 26-30


Magnesium up from 1.9-2.1


CRP down from 5.2-2.8


Glucose trend 171-220





9/14/2020


Patient is asymptomatic.


Vital signs are stable and pulse ox is in the mid to upper 90s on room air.


CRP 2.0 and normal d-dimer.


Sodium up to 136.


BUN to creatinine ratio is still elevated at 30.0.


Estimated GFR is 30 and stable.  This is his chronic stable state.


Episode of blood sugar of 300 today.


Started sliding scale insulin.





9/15/2020


Patient is feeling well.


Vital signs stable with oxygenation in the mid upper 90s and afebrile.


Systolic blood pressure in the 160s today


Labs held today.


Blood sugars improved.


Awaiting placement.





- Plan


Plan:: 








COVID-19


COPD (chronic obstructive pulmonary disease)


-Remdesivir completed


-Continue dexamethasone day 5 out of 10


-Continue Symbicort


-PRN albuterol


-Repeat labs as needed





Atrial fibrillation with controlled ventricular rate


-Continue to hold metoprolol


-Monitor for hypoxemia


 


Acute on chronic renal failure 2/2 Volume depletion, improved


Hyponatremia, stable


Iron deficiency anemia


B12 deficiency


-Monitor urine output


-Renally dosed medications


-Repeat labs and replace electrolytes as needed  





Hypertension


-Restart metoprolol


-PRN Hydralazine





Diabetes mellitus, [id number]%


-Continue home insulin at same dose for now


-Accu-checks before meals and bedtime


-Hypoglycemia protocol


-Hold PO meds


-Start sliding scale insulin


-Expect continued increase in blood sugars secondary to dexamethasone.





Gastroesophageal reflux disease


-Continue home omeprazole





Dyslipidemia


- Continue home statin





Hypoalbuminemia


-Dietary consult





Depression


-Continue home sertraline





Orthostatic, resolved





PROPHYLAXIS


DVT- home Xarelto


GI- home omeprazole





CODE STATUS: FULL CODE





DISPOSITION:


Awaiting placement.

## 2020-09-16 NOTE — PCM.PN
- General Info


Date of Service: 09/16/20


Admission Dx/Problem (Free Text): 


                           Admission Diagnosis/Problem





Admission Diagnosis/Problem      Hypoxia








Subjective Update: 





Patient is doing well.  Appetite is good.  No change overnight.


Functional Status: Reports: Pain Controlled





- Review of Systems


General: Reports: No Symptoms


HEENT: Reports: No Symptoms


Pulmonary: Reports: No Symptoms


Cardiovascular: Reports: No Symptoms


Gastrointestinal: Reports: No Symptoms


Musculoskeletal: Reports: No Symptoms


Skin: Reports: No Symptoms


Neurological: Reports: No Symptoms





- Patient Data


Vitals - Most Recent: 


                                Last Vital Signs











Temp  97.7 F   09/16/20 11:19


 


Pulse  70   09/16/20 11:19


 


Resp  20   09/16/20 11:19


 


BP  150/87 H  09/16/20 11:19


 


Pulse Ox  98   09/16/20 11:19








                                        





Orthostatic Blood Pressure [     71/53


Standing]                        


Orthostatic Blood Pressure [     100/71


Sitting]                         


Orthostatic Blood Pressure [     121/52


Supine]                          








Weight - Most Recent: 66.361 kg


I&O - Last 24 Hours: 


                                 Intake & Output











 09/16/20 09/16/20 09/16/20





 06:59 14:59 22:59


 


Intake Total 50  


 


Balance 50  











Lab Results Last 24 Hours: 


                         Laboratory Results - last 24 hr











  09/15/20 09/15/20 09/16/20 Range/Units





  17:15 21:38 04:10 


 


WBC     (4.23-9.07)  K/mm3


 


RBC     (4.63-6.08)  M/mm3


 


Hgb     (13.7-17.5)  gm/dl


 


Hct     (40.1-51.0)  %


 


MCV     (79.0-92.2)  fl


 


MCH     (25.7-32.2)  pg


 


MCHC     (32.2-35.5)  g/dl


 


RDW Std Deviation     (35.1-43.9)  fL


 


Plt Count     (163-337)  K/mm3


 


MPV     (9.4-12.3)  fl


 


Neut % (Auto)     (34.0-67.9)  %


 


Lymph % (Auto)     (21.8-53.1)  %


 


Mono % (Auto)     (5.3-12.2)  %


 


Eos % (Auto)     (0.8-7.0)  


 


Baso % (Auto)     (0.1-1.2)  %


 


Neut # (Auto)     (1.78-5.38)  K/mm3


 


Lymph # (Auto)     (1.32-3.57)  K/mm3


 


Mono # (Auto)     (0.30-0.82)  K/mm3


 


Eos # (Auto)     (0.04-0.54)  K/mm3


 


Baso # (Auto)     (0.01-0.08)  K/mm3


 


Manual Slide Review     


 


Sodium    137  (136-145)  mEq/L


 


Potassium    4.1  (3.5-5.1)  mEq/L


 


Chloride    103  ()  mEq/L


 


Carbon Dioxide    24  (21-32)  mEq/L


 


Anion Gap    14.1  (5-15)  


 


BUN    62 H  (7-18)  mg/dL


 


Creatinine    1.8 H  (0.7-1.3)  mg/dL


 


Est Cr Clr Drug Dosing    29.04  mL/min


 


Estimated GFR (MDRD)    36  (>60)  mL/min


 


BUN/Creatinine Ratio    34.4 H  (14-18)  


 


Glucose    183 H  ()  mg/dL


 


POC Glucose  120 H  224 H   ()  mg/dL


 


Calcium    8.7  (8.5-10.1)  mg/dL


 


Magnesium    2.1  (1.8-2.4)  mg/dl














  09/16/20 09/16/20 09/16/20 Range/Units





  04:40 06:43 11:18 


 


WBC  7.91    (4.23-9.07)  K/mm3


 


RBC  4.07 L    (4.63-6.08)  M/mm3


 


Hgb  12.0 L    (13.7-17.5)  gm/dl


 


Hct  35.9 L    (40.1-51.0)  %


 


MCV  88.2    (79.0-92.2)  fl


 


MCH  29.5    (25.7-32.2)  pg


 


MCHC  33.4    (32.2-35.5)  g/dl


 


RDW Std Deviation  43.4    (35.1-43.9)  fL


 


Plt Count  271    (163-337)  K/mm3


 


MPV  8.8 L    (9.4-12.3)  fl


 


Neut % (Auto)  86.3 H    (34.0-67.9)  %


 


Lymph % (Auto)  6.7 L    (21.8-53.1)  %


 


Mono % (Auto)  6.1    (5.3-12.2)  %


 


Eos % (Auto)  0 L    (0.8-7.0)  


 


Baso % (Auto)  0.1    (0.1-1.2)  %


 


Neut # (Auto)  6.83 H    (1.78-5.38)  K/mm3


 


Lymph # (Auto)  0.53 L    (1.32-3.57)  K/mm3


 


Mono # (Auto)  0.48    (0.30-0.82)  K/mm3


 


Eos # (Auto)  0.00 L    (0.04-0.54)  K/mm3


 


Baso # (Auto)  0.01    (0.01-0.08)  K/mm3


 


Manual Slide Review  Abnormal smear    


 


Sodium     (136-145)  mEq/L


 


Potassium     (3.5-5.1)  mEq/L


 


Chloride     ()  mEq/L


 


Carbon Dioxide     (21-32)  mEq/L


 


Anion Gap     (5-15)  


 


BUN     (7-18)  mg/dL


 


Creatinine     (0.7-1.3)  mg/dL


 


Est Cr Clr Drug Dosing     mL/min


 


Estimated GFR (MDRD)     (>60)  mL/min


 


BUN/Creatinine Ratio     (14-18)  


 


Glucose     ()  mg/dL


 


POC Glucose   166 H  262 H  ()  mg/dL


 


Calcium     (8.5-10.1)  mg/dL


 


Magnesium     (1.8-2.4)  mg/dl











Med Orders - Current: 


                               Current Medications





Acetaminophen (Tylenol)  325 mg PO Q4H PRN


   PRN Reason: Pain (Mild 1-3)/fever


Albuterol/Ipratropium (Duoneb 3.0-0.5 Mg/3 Ml)  3 ml INH Q4H PRN


   PRN Reason: Shortness of Breath


Aspirin (Halfprin)  81 mg PO DAILY [facility]


   Last Admin: 09/16/20 09:41 Dose:  81 mg


   Documented by: 


Calcitriol (Rocaltrol)  0.25 mcg PO MOFR [facility]


   Last Admin: 09/14/20 10:43 Dose:  0.25 mcg


   Documented by: 


Cholecalciferol (Vitamin D3)  25 mcg PO DAILY [facility]


   Last Admin: 09/16/20 09:41 Dose:  25 mcg


   Documented by: 


Dexamethasone (Dexamethasone)  6 mg PO Q24H [facility]


   Stop: 09/20/20 13:01


   Last Admin: 09/16/20 12:00 Dose:  6 mg


   Documented by: 


Dextrose/Water (Dextrose 50% In Water)  50 ml IVPUSH ASDIRECTED PRN


   PRN Reason: Hypoglycemia


Ferrous Sulfate (Ferrous Sulfate)  324 mg PO Q48H [facility]


   Last Admin: 09/16/20 09:41 Dose:  324 mg


   Documented by: 


Fluticasone Propionate (Flonase)  0 gm NASBOTH DAILY [facility]


   Last Admin: 09/16/20 09:42 Dose:  1 spray


   Documented by: 


Insulin Glargine (Lantus)  10 unit SUBCUT BEDTIME [facility]


   Last Admin: 09/15/20 21:50 Dose:  10 units


   Documented by: 


Insulin Human Lispro (Humalog)  0 unit SUBCUT QIDACANDBED [facility]; Protocol


   Last Admin: 09/16/20 12:01 Dose:  3 units


   Documented by: 


Metoprolol Succinate (Toprol Xl)  100 mg PO DAILY [facility]


   Last Admin: 09/16/20 09:41 Dose:  100 mg


   Documented by: 


Mometasone Furoate/Formoterol Fumar (Dulera 200-5 Mcg)  2 puff IH BID [facility]


   Last Admin: 09/16/20 09:04 Dose:  2 puff


   Documented by: 


Ethacrynic Acid 50 (Mg **Ptom)  50 mg PO BID [facility]


   Last Admin: 09/16/20 09:42 Dose:  Not Given


   Documented by: 


Ondansetron HCl (Zofran)  4 mg IV Q6H PRN


   PRN Reason: Nausea/Vomiting


   Last Admin: 09/12/20 11:18 Dose:  4 mg


   Documented by: 


Pantoprazole Sodium (Protonix***)  40 mg PO DAILY [facility]


   Last Admin: 09/16/20 09:41 Dose:  40 mg


   Documented by: 


Rivaroxaban (Xarelto)  10 mg PO BEDTIME [facility]


   Last Admin: 09/15/20 21:42 Dose:  10 mg


   Documented by: 


Rosuvastatin Calcium (Crestor)  10 mg PO BEDTIME [facility]


   Last Admin: 09/15/20 21:41 Dose:  10 mg


   Documented by: 


Senna/Docusate Sodium (Senna Plus)  1 tab PO DAILY [facility]


   Last Admin: 09/16/20 09:42 Dose:  1 tab


   Documented by: 


Sertraline HCl (Zoloft)  50 mg PO BEDTIME [facility]


   Last Admin: 09/15/20 21:42 Dose:  50 mg


   Documented by: 


Sodium Chloride (Ayr Saline Nasal Gel)  0 gm RHONA TID PRN


   PRN Reason: Dryness


Spironolactone (Aldactone)  25 mg PO DAILY [facility]


   Last Admin: 09/16/20 09:41 Dose:  25 mg


   Documented by: 


Tamsulosin HCl (Flomax)  0.8 mg PO BEDTIME [facility]


   Last Admin: 09/15/20 21:42 Dose:  0.8 mg


   Documented by: 





Discontinued Medications





Calcitriol (Rocaltrol)  0.25 mcg PO ONETIME ONE


   Stop: 09/11/20 13:01


   Last Admin: 09/11/20 13:09 Dose:  0.25 mcg


   Documented by: 


Dexamethasone (Dexamethasone)  6 mg IVPUSH Q24H [facility]


   Stop: 09/20/20 13:01


   Last Admin: 09/14/20 12:05 Dose:  6 mg


   Documented by: 


Sodium Chloride (Normal Saline)  500 mls @ 1,000 mls/hr IV .BOLUS ONE


   Stop: 09/11/20 04:18


   Last Admin: 09/11/20 04:05 Dose:  1,000 mls/hr


   Documented by: 


Sodium Chloride (Normal Saline)  500 mls @ 1,000 mls/hr IV .BOLUS ONE


   Stop: 09/11/20 05:56


   Last Admin: 09/11/20 05:34 Dose:  1,000 mls/hr


   Documented by: 


Remdesivir 100 mg/ Sodium (Chloride)  100 mls @ 100 mls/hr IV Q24H [facility]


   Stop: 09/15/20 13:59


   Last Admin: 09/15/20 12:38 Dose:  100 mls/hr


   Documented by: 


Remdesivir 200 mg/ Sodium (Chloride)  250 mls @ 250 mls/hr IV ONETIME ONE


   Stop: 09/11/20 13:59


   Last Admin: 09/11/20 13:05 Dose:  250 mls/hr


   Documented by: 











- Exam


General: Alert, Oriented


HEENT: Pupils Equal, Mucous Membr. Moist/Pink


Neck: Supple


Lungs: Clear to Auscultation, Normal Respiratory Effort


Cardiovascular: Regular Rate, Regular Rhythm


GI/Abdominal Exam: Normal Bowel Sounds, Soft, Non-Tender, No Organomegaly, No 

Distention


Extremities: Normal Inspection, Normal Range of Motion, No Pedal Edema, Normal 

Capillary Refill


Skin: Warm, Dry, Intact


Neurological: No New Focal Deficit


Psy/Mental Status: Alert, Normal Affect, Normal Mood





Sepsis Event Note





- Evaluation


Sepsis Screening Result: No Definite Risk





- Focused Exam


Vital Signs: 


                                   Vital Signs











  Temp Pulse Resp BP Pulse Ox Pulse Ox


 


 09/16/20 11:19  97.7 F  70  20  150/87 H  98 


 


 09/16/20 10:18   79   143/90 H  90 L 


 


 09/16/20 09:41   70   178/69 H  


 


 09/16/20 09:04       96


 


 09/16/20 07:58  97.5 F  70  18  178/69 H  96 


 


 09/16/20 04:13  98.1 F  73  18  144/67 H  97 














- Problem List & Annotations


(1) Acute on chronic renal failure


SNOMED Code(s): 119684623


   Code(s): N17.9 - ACUTE KIDNEY FAILURE, UNSPECIFIED; N18.9 - CHRONIC KIDNEY 

DISEASE, UNSPECIFIED   Status: Acute   Current Visit: Yes   





(2) Atrial fibrillation with controlled ventricular rate


SNOMED Code(s): 77395579


   Code(s): I48.91 - UNSPECIFIED ATRIAL FIBRILLATION   Status: Acute   Current 

Visit: Yes   





(3) B12 deficiency


SNOMED Code(s): 279801157


   Code(s): E53.8 - DEFICIENCY OF OTHER SPECIFIED B GROUP VITAMINS   Status: 

Acute   Current Visit: Yes   





(4) COVID-19


SNOMED Code(s): 511076410


   Code(s): U07.1 - COVID-19   Status: Acute   Current Visit: Yes   





(5) Congestive heart failure


SNOMED Code(s): 21468530


   Code(s): I50.9 - HEART FAILURE, UNSPECIFIED   Status: Acute   Current Visit: 

Yes   





- Problem List Review


Problem List Initiated/Reviewed/Updated: Yes





- My Orders


Last 24 Hours: 


My Active Orders





09/16/20 09:55


Communication Order [RC] DAILY 














- Assessment


Assessment:: 





9/11/20


1 day of weakness and low O2 sats


Lives in [place], + COVID patients in past week


Pulse ox on admission 89%


CXR with cardiomegaly only, no lung parenchyma changes


Orthostatic --> given 500ml IVF bolus--> still orthostatic--> 2nd bolus


Lab results:


- Hb 13.2, Hct 40.1


- Na 133, GFR 19, glucose 165


- Troponin negative


Heart rate on admission 98


EKG with a. fib., rate 84


RVR will likely increase O2 needs- COVID positive


GFR baseline 26, 19 on admission


Not on any nephrotoxic meds 


Decreased oral intake


Admission /81


Hypertension home management with metoprolol


Diabetes home management with glimepiride, sitagliptin and insulin


Glucose on admission 165


Smoked 1.5 ppd for 51 years; Quit in 2005 


PLAN


- Start Remdesivir


- Start Dexamethasone


- PRN albuterol


- Continue Symbicort


- Hold metoprolol due to orthostatic hypotension


- Monitor for hypoxemia


- Monitor urine output


- Renally dosed medications


- Repeat labs and replace electrolytes as needed  


- Hold metoprolol


- PRN Hydralazine


- Continue home insulin at same dose for now


- No premeals, adjust with new measurements as needed


- New HbA1c


- Accu-checks before meals and bedtime


- Hypoglycemia protocol


- Hold PO meds


- Continue home omeprazole


- Continue home statin


- Dietary consult


- Continue home sertraline


Patient will be admitted to [facility] surg floor, will be started on Remdesivir and 

dexamethasone. 





9/12/20


No longer on oxygen supplementation since 1:30 PM on 9/11/2020


Vital signs trend


Blood pressure: 117 264/50 3-91


T-max 98.2


Heart rate: 66-98


Pulse ox greater than 89%


Lab results


WBC down from 6.96-4.05


Hemoglobin down from 13.2-11.7


D-dimer stable from 0.72 2.71


Sodium down from 1 33-1 32


GFR up from 19-26


B12 87


BNP up from 0834-3857


CRP 5.2


Iron 26


Glucose -258


A1c 6.9%


PLAN


-Continue Remdesivir day 2 out of 5


-Continue dexamethasone day 2 out of 10


-Continue Symbicort


-PRN albuterol


-Repeat labs as needed


-Continue to hold metoprolol


-Monitor for hypoxemia


-Monitor urine output


-Renally dosed medications


-Repeat labs and replace electrolytes as needed  


-Hold metoprolol


-PRN Hydralazine


-Continue home insulin at same dose for now


-Accu-checks before meals and bedtime


-Hypoglycemia protocol


-Hold PO meds


-Continue home omeprazole


- Continue home statin


Patient will remain admitted for antiviral treatment for COVID.


Length of stay at least 5 days due to length of treatment required for COVID.





9/13/20


Shortness of breath has resolved


Has not required oxygen supplementation the past 24 hours


Vital signs trend


Blood pressure 442454/7289


T-max 98.4


Heart rate 66 to 88/min


Pulse ox greater than 97% on room air


New lab results


WBC up from 4.05-7.54


Hemoglobin up from 11.7-11.9


Platelets up from 193-235


D-dimer down from 0.71 2.5


Sodium stable from 1 


Chloride up from 94-96


GFR up from 26-30


Magnesium up from 1.9-2.1


CRP down from 5.2-2.8


Glucose trend 171-220





9/14/2020


Patient is asymptomatic.


Vital signs are stable and pulse ox is in the mid to upper 90s on room air.


CRP 2.0 and normal d-dimer.


Sodium up to 136.


BUN to creatinine ratio is still elevated at 30.0.


Estimated GFR is 30 and stable.  This is his chronic stable state.


Episode of blood sugar of 300 today.


Started sliding scale insulin.





9/15/2020


Patient is feeling well.


Vital signs stable with oxygenation in the mid upper 90s and afebrile.


Systolic blood pressure in the 160s today


Labs held today.


Blood sugars improved.


Awaiting placement.





9/16/2020


Patient is stable without any significant changes.


Blood pressure is ranging in the 150s up to 170s.  Afebrile.  Oxygen saturation 

on room air ranging from 90 to 100%, weight stable at approximately 66 kg


Hemoglobin and white count are stable at 12.0 and 7.9 respectively.


GFR 36 with creatinine of 1.8 which is improved.


BUN stable at 62.  Elevation likely secondary to protein catabolism from 

dexamethasone.


Blood sugars improved ranging from 120 up to 262


Still awaiting placement


Patient has not been getting his ethacrynic acid because it is nonformulary.








- Plan


Plan:: 








COVID-19


COPD (chronic obstructive pulmonary disease)


-Remdesivir completed


-Continue dexamethasone day 6 out of 10


-Continue Symbicort


-PRN albuterol


-Repeat labs as needed





Atrial fibrillation with controlled ventricular rate


-Continue metoprolol


-Monitor for hypoxemia


 


Acute on chronic renal failure 2/2 Volume depletion, improved


Hyponatremia, resolved


Iron deficiency anemia


B12 deficiency


-Monitor urine output


-Renally dosed medications


-Repeat labs and replace electrolytes as needed  





Hypertension


-Restart metoprolol


-Start Lasix 20 mg daily


-PRN Hydralazine





Diabetes mellitus, [id number]-0.9%


-Continue home insulin at same dose for now


-Accu-checks before meals and bedtime


-Hypoglycemia protocol


-Hold PO meds


- sliding scale insulin


-Expect continued increase in blood sugars secondary to dexamethasone.





Gastroesophageal reflux disease


-Continue home omeprazole





Dyslipidemia


- Continue home statin





Hypoalbuminemia


-Dietary consult





Depression


-Continue home sertraline





Orthostatic, resolved





PROPHYLAXIS


DVT- home Xarelto


GI- home omeprazole





CODE STATUS: FULL CODE





DISPOSITION:


Awaiting placement.

## 2020-09-17 NOTE — PCM.PN
- General Info


Date of Service: 09/17/20


Admission Dx/Problem (Free Text): 


                           Admission Diagnosis/Problem





Admission Diagnosis/Problem      Hypoxia








Subjective Update: 





Patient continues to do well.  Appetite is good and he has no respiratory 

symptoms.  No changes.


Functional Status: Reports: Pain Controlled





- Review of Systems


General: Reports: No Symptoms


HEENT: Reports: No Symptoms


Pulmonary: Reports: No Symptoms


Cardiovascular: Reports: No Symptoms


Gastrointestinal: Reports: No Symptoms


Musculoskeletal: Reports: No Symptoms


Neurological: Reports: No Symptoms





- Patient Data


Vitals - Most Recent: 


                                Last Vital Signs











Temp  98.1 F   09/17/20 15:43


 


Pulse  82   09/17/20 15:43


 


Resp  20   09/17/20 15:43


 


BP  173/69 H  09/17/20 15:43


 


Pulse Ox  97   09/17/20 15:43








                                        





Orthostatic Blood Pressure [     71/53


Standing]                        


Orthostatic Blood Pressure [     100/71


Sitting]                         


Orthostatic Blood Pressure [     121/52


Supine]                          








Weight - Most Recent: 66.587 kg


I&O - Last 24 Hours: 


                                 Intake & Output











 09/17/20 09/17/20 09/17/20





 06:59 14:59 22:59


 


Intake Total 150  240


 


Balance 150  240











Lab Results Last 24 Hours: 


                         Laboratory Results - last 24 hr











  09/15/20 09/16/20 09/16/20 Range/Units





  12:30 17:43 21:06 


 


POC Glucose   182 H  307 H  ()  mg/dL


 


Procalcitonin  0.10 H    (<0.10)  ng/mL














  09/17/20 09/17/20 Range/Units





  06:32 11:03 


 


POC Glucose  153 H  146 H  ()  mg/dL


 


Procalcitonin    (<0.10)  ng/mL











Med Orders - Current: 


                               Current Medications





Acetaminophen (Tylenol)  325 mg PO Q4H PRN


   PRN Reason: Pain (Mild 1-3)/fever


Albuterol/Ipratropium (Duoneb 3.0-0.5 Mg/3 Ml)  3 ml INH Q4H PRN


   PRN Reason: Shortness of Breath


Aspirin (Halfprin)  81 mg PO DAILY [facility]


   Last Admin: 09/17/20 08:21 Dose:  81 mg


   Documented by: 


Calcitriol (Rocaltrol)  0.25 mcg PO MOFR [facility]


   Last Admin: 09/14/20 10:43 Dose:  0.25 mcg


   Documented by: 


Cholecalciferol (Vitamin D3)  25 mcg PO DAILY [facility]


   Last Admin: 09/17/20 08:22 Dose:  25 mcg


   Documented by: 


Dexamethasone (Dexamethasone)  6 mg PO Q24H [facility]


   Stop: 09/20/20 13:01


   Last Admin: 09/17/20 13:46 Dose:  6 mg


   Documented by: 


Dextrose/Water (Dextrose 50% In Water)  50 ml IVPUSH ASDIRECTED PRN


   PRN Reason: Hypoglycemia


Ferrous Sulfate (Ferrous Sulfate)  324 mg PO Q48H [facility]


   Last Admin: 09/16/20 09:41 Dose:  324 mg


   Documented by: 


Fluticasone Propionate (Flonase)  0 gm NASBOTH DAILY [facility]


   Last Admin: 09/17/20 08:19 Dose:  1 spray


   Documented by: 


Furosemide (Lasix)  20 mg PO DAILY [facility]


   Last Admin: 09/17/20 08:21 Dose:  20 mg


   Documented by: 


Insulin Glargine (Lantus)  10 unit SUBCUT BEDTIME [facility]


   Last Admin: 09/16/20 21:10 Dose:  10 units


   Documented by: 


Insulin Human Lispro (Humalog)  0 unit SUBCUT QIDACANDBED [facility]; Protocol


   Last Admin: 09/17/20 11:41 Dose:  Not Given


   Documented by: 


Metoprolol Succinate (Toprol Xl)  100 mg PO DAILY [facility]


   Last Admin: 09/17/20 08:22 Dose:  100 mg


   Documented by: 


Mometasone Furoate/Formoterol Fumar (Dulera 200-5 Mcg)  2 puff IH BID [facility]


   Last Admin: 09/17/20 08:10 Dose:  2 puff


   Documented by: 


Ethacrynic Acid 50 (Mg **Ptom)  50 mg PO BID [facility]


   Last Admin: 09/17/20 08:19 Dose:  Not Given


   Documented by: 


Ondansetron HCl (Zofran)  4 mg IV Q6H PRN


   PRN Reason: Nausea/Vomiting


   Last Admin: 09/12/20 11:18 Dose:  4 mg


   Documented by: 


Pantoprazole Sodium (Protonix***)  40 mg PO DAILY [facility]


   Last Admin: 09/17/20 08:21 Dose:  40 mg


   Documented by: 


Rivaroxaban (Xarelto)  10 mg PO BEDTIME [facility]


   Last Admin: 09/16/20 21:13 Dose:  10 mg


   Documented by: 


Rosuvastatin Calcium (Crestor)  10 mg PO BEDTIME [facility]


   Last Admin: 09/16/20 21:13 Dose:  10 mg


   Documented by: 


Senna/Docusate Sodium (Senna Plus)  1 tab PO DAILY [facility]


   Last Admin: 09/17/20 08:21 Dose:  1 tab


   Documented by: 


Sertraline HCl (Zoloft)  50 mg PO BEDTIME RODRIGO


   Last Admin: 09/16/20 21:12 Dose:  50 mg


   Documented by: 


Sodium Chloride (Ayr Saline Nasal Gel)  0 gm RHONA TID PRN


   PRN Reason: Dryness


Spironolactone (Aldactone)  25 mg PO DAILY [facility]


   Last Admin: 09/17/20 08:19 Dose:  25 mg


   Documented by: 


Tamsulosin HCl (Flomax)  0.8 mg PO BEDTIME [facility]


   Last Admin: 09/16/20 21:13 Dose:  0.8 mg


   Documented by: 





Discontinued Medications





Calcitriol (Rocaltrol)  0.25 mcg PO ONETIME ONE


   Stop: 09/11/20 13:01


   Last Admin: 09/11/20 13:09 Dose:  0.25 mcg


   Documented by: 


Dexamethasone (Dexamethasone)  6 mg IVPUSH Q24H RODRIGO


   Stop: 09/20/20 13:01


   Last Admin: 09/14/20 12:05 Dose:  6 mg


   Documented by: 


Sodium Chloride (Normal Saline)  500 mls @ 1,000 mls/hr IV .BOLUS ONE


   Stop: 09/11/20 04:18


   Last Admin: 09/11/20 04:05 Dose:  1,000 mls/hr


   Documented by: 


Sodium Chloride (Normal Saline)  500 mls @ 1,000 mls/hr IV .BOLUS ONE


   Stop: 09/11/20 05:56


   Last Admin: 09/11/20 05:34 Dose:  1,000 mls/hr


   Documented by: 


Remdesivir 100 mg/ Sodium (Chloride)  100 mls @ 100 mls/hr IV Q24H RODRIGO


   Stop: 09/15/20 13:59


   Last Admin: 09/15/20 12:38 Dose:  100 mls/hr


   Documented by: 


Remdesivir 200 mg/ Sodium (Chloride)  250 mls @ 250 mls/hr IV ONETIME ONE


   Stop: 09/11/20 13:59


   Last Admin: 09/11/20 13:05 Dose:  250 mls/hr


   Documented by: 











- Exam


Quality Assessment: No: Supplemental Oxygen


General: Alert, Oriented


HEENT: Pupils Equal, Mucous Membr. Moist/Pink


Neck: Supple


Lungs: Clear to Auscultation, Normal Respiratory Effort


Cardiovascular: Regular Rate, Regular Rhythm


GI/Abdominal Exam: Normal Bowel Sounds, Soft, Non-Tender, No Distention


Extremities: Normal Inspection, Normal Range of Motion, No Pedal Edema, Normal 

Capillary Refill


Skin: Warm, Dry, Intact


Neurological: No New Focal Deficit


Psy/Mental Status: Alert, Normal Affect, Normal Mood





Sepsis Event Note





- Evaluation


Sepsis Screening Result: No Definite Risk





- Focused Exam


Vital Signs: 


                                   Vital Signs











  Temp Pulse Resp BP Pulse Ox Pulse Ox


 


 09/17/20 15:43  98.1 F  82  20  173/69 H  97 


 


 09/17/20 11:08  97.5 F  68  16  169/75 H  98 


 


 09/17/20 08:22   64   160/65 H  


 


 09/17/20 08:10       96


 


 09/17/20 07:49  97.7 F  64  16  160/65 H  96 














- Problem List & Annotations


(1) Acute on chronic renal failure


SNOMED Code(s): 121493766


   Code(s): N17.9 - ACUTE KIDNEY FAILURE, UNSPECIFIED; N18.9 - CHRONIC KIDNEY 

DISEASE, UNSPECIFIED   Status: Acute   Current Visit: Yes   





(2) Atrial fibrillation with controlled ventricular rate


SNOMED Code(s): 13255517


   Code(s): I48.91 - UNSPECIFIED ATRIAL FIBRILLATION   Status: Acute   Current 

Visit: Yes   





(3) B12 deficiency


SNOMED Code(s): 321451052


   Code(s): E53.8 - DEFICIENCY OF OTHER SPECIFIED B GROUP VITAMINS   Status: 

Acute   Current Visit: Yes   





(4) COVID-19


SNOMED Code(s): 489163448


   Code(s): U07.1 - COVID-19   Status: Acute   Current Visit: Yes   





(5) Congestive heart failure


SNOMED Code(s): 20370245


   Code(s): I50.9 - HEART FAILURE, UNSPECIFIED   Status: Acute   Current Visit: 

Yes   





- Problem List Review


Problem List Initiated/Reviewed/Updated: Yes





- My Orders


Last 24 Hours: 


My Active Orders





09/17/20 09:00


Furosemide [Lasix]   20 mg PO DAILY 














- Assessment


Assessment:: 





9/11/20


1 day of weakness and low O2 sats


Lives in [place], + COVID patients in past week


Pulse ox on admission 89%


CXR with cardiomegaly only, no lung parenchyma changes


Orthostatic --> given 500ml IVF bolus--> still orthostatic--> 2nd bolus


Lab results:


- Hb 13.2, Hct 40.1


- Na 133, GFR 19, glucose 165


- Troponin negative


Heart rate on admission 98


EKG with a. fib., rate 84


RVR will likely increase O2 needs- COVID positive


GFR baseline 26, 19 on admission


Not on any nephrotoxic meds 


Decreased oral intake


Admission /81


Hypertension home management with metoprolol


Diabetes home management with glimepiride, sitagliptin and insulin


Glucose on admission 165


Smoked 1.5 ppd for 51 years; Quit in 2005 


PLAN


- Start Remdesivir


- Start Dexamethasone


- PRN albuterol


- Continue Symbicort


- Hold metoprolol due to orthostatic hypotension


- Monitor for hypoxemia


- Monitor urine output


- Renally dosed medications


- Repeat labs and replace electrolytes as needed  


- Hold metoprolol


- PRN Hydralazine


- Continue home insulin at same dose for now


- No premeals, adjust with new measurements as needed


- New HbA1c


- Accu-checks before meals and bedtime


- Hypoglycemia protocol


- Hold PO meds


- Continue home omeprazole


- Continue home statin


- Dietary consult


- Continue home sertraline


Patient will be admitted to med surg floor, will be started on Remdesivir and 

dexamethasone. 





9/12/20


No longer on oxygen supplementation since 1:30 PM on 9/11/2020


Vital signs trend


Blood pressure: 117 264/50 3-91


T-max 98.2


Heart rate: 66-98


Pulse ox greater than 89%


Lab results


WBC down from 6.96-4.05


Hemoglobin down from 13.2-11.7


D-dimer stable from 0.72 2.71


Sodium down from 1 33-1 32


GFR up from 19-26


B12 87


BNP up from 1367-5596


CRP 5.2


Iron 26


Glucose -258


A1c 6.9%


PLAN


-Continue Remdesivir day 2 out of 5


-Continue dexamethasone day 2 out of 10


-Continue Symbicort


-PRN albuterol


-Repeat labs as needed


-Continue to hold metoprolol


-Monitor for hypoxemia


-Monitor urine output


-Renally dosed medications


-Repeat labs and replace electrolytes as needed  


-Hold metoprolol


-PRN Hydralazine


-Continue home insulin at same dose for now


-Accu-checks before meals and bedtime


-Hypoglycemia protocol


-Hold PO meds


-Continue home omeprazole


- Continue home statin


Patient will remain admitted for antiviral treatment for COVID.


Length of stay at least 5 days due to length of treatment required for COVID.





9/13/20


Shortness of breath has resolved


Has not required oxygen supplementation the past 24 hours


Vital signs trend


Blood pressure 282184/7289


T-max 98.4


Heart rate 66 to 88/min


Pulse ox greater than 97% on room air


New lab results


WBC up from 4.05-7.54


Hemoglobin up from 11.7-11.9


Platelets up from 193-235


D-dimer down from 0.71 2.5


Sodium stable from 1 


Chloride up from 94-96


GFR up from 26-30


Magnesium up from 1.9-2.1


CRP down from 5.2-2.8


Glucose trend 171-220





9/14/2020


Patient is asymptomatic.


Vital signs are stable and pulse ox is in the mid to upper 90s on room air.


CRP 2.0 and normal d-dimer.


Sodium up to 136.


BUN to creatinine ratio is still elevated at 30.0.


Estimated GFR is 30 and stable.  This is his chronic stable state.


Episode of blood sugar of 300 today.


Started sliding scale insulin.





9/15/2020


Patient is feeling well.


Vital signs stable with oxygenation in the mid upper 90s and afebrile.


Systolic blood pressure in the 160s today


Labs held today.


Blood sugars improved.


Awaiting placement.





9/16/2020


Patient is stable without any significant changes.


Blood pressure is ranging in the 150s up to 170s.  Afebrile.  Oxygen saturation 

on room air ranging from 90 to 100%, weight stable at approximately 66 kg


Hemoglobin and white count are stable at 12.0 and 7.9 respectively.


GFR 36 with creatinine of 1.8 which is improved.


BUN stable at 62.  Elevation likely secondary to protein catabolism from 

dexamethasone.


Blood sugars improved ranging from 120 up to 262


Still awaiting placement


Patient has not been getting his ethacrynic acid because it is nonformulary.





9/17/2020


Blood pressure continues to be elevated.  He was started on low-dose Lasix this 

morning.  He would likely benefit from additional treatment.


Improvement in glucose. But continues to be elevated.  This should resolve once 

he is off of dexamethasone.


No significant change overnight.





- Plan


Plan:: 








COVID-19


COPD (chronic obstructive pulmonary disease)


-Remdesivir completed


-Continue dexamethasone day 7 out of 10


-Continue Symbicort


-PRN albuterol


-Repeat labs as needed





Atrial fibrillation with controlled ventricular rate


-Continue metoprolol


-Monitor for hypoxemia


 


Acute on chronic renal failure 2/2 Volume depletion, improved


Hyponatremia, resolved


Iron deficiency anemia


B12 deficiency


-Monitor urine output


-Renally dosed medications


-Repeat labs and replace electrolytes as needed  





Hypertension


-Restart metoprolol


-Continue Lasix 20 mg daily


-Amlodipine 5 mg at night


-PRN Hydralazine





Diabetes mellitus, JoP1v-3.9%


-Continue home insulin 


-Accu-checks before meals and bedtime


-Hypoglycemia protocol


-Hold PO meds


- sliding scale insulin


-Expect continued increase in blood sugars secondary to dexamethasone.





Gastroesophageal reflux disease


-Continue home omeprazole





Dyslipidemia


- Continue home statin





Hypoalbuminemia


-Dietary consult





Depression


-Continue home sertraline





Orthostatic, resolved





PROPHYLAXIS


DVT- home Xarelto


GI- home omeprazole





CODE STATUS: FULL CODE





DISPOSITION:


Awaiting placement.

## 2020-09-18 NOTE — PCM.PN
- General Info


Date of Service: 09/18/20


Admission Dx/Problem (Free Text): 


                           Admission Diagnosis/Problem





Admission Diagnosis/Problem      Hypoxia








Subjective Update: 





Patient with no significant change.  Continue waiting for placement.


Functional Status: Reports: Pain Controlled





- Review of Systems


General: Reports: No Symptoms


HEENT: Reports: No Symptoms


Pulmonary: Reports: No Symptoms


Gastrointestinal: Reports: No Symptoms


Musculoskeletal: Reports: No Symptoms





- Patient Data


Vitals - Most Recent: 


                                Last Vital Signs











Temp  97.3 F   09/18/20 11:41


 


Pulse  70   09/18/20 11:41


 


Resp  16   09/18/20 11:41


 


BP  138/76   09/18/20 11:41


 


Pulse Ox  94 L  09/18/20 11:41








                                        





Orthostatic Blood Pressure [     71/53


Standing]                        


Orthostatic Blood Pressure [     100/71


Sitting]                         


Orthostatic Blood Pressure [     121/52


Supine]                          








Weight - Most Recent: 66.678 kg


I&O - Last 24 Hours: 


                                 Intake & Output











 09/17/20 09/18/20 09/18/20





 22:59 06:59 14:59


 


Intake Total 640 350 360


 


Balance 640 350 360











Lab Results Last 24 Hours: 


                         Laboratory Results - last 24 hr











  09/17/20 09/17/20 09/18/20 Range/Units





  17:57 21:14 06:31 


 


POC Glucose  252 H  280 H  148 H  ()  mg/dL











Med Orders - Current: 


                               Current Medications





Acetaminophen (Tylenol)  325 mg PO Q4H PRN


   PRN Reason: Pain (Mild 1-3)/fever


Albuterol/Ipratropium (Duoneb 3.0-0.5 Mg/3 Ml)  3 ml INH Q4H PRN


   PRN Reason: Shortness of Breath


Amlodipine Besylate (Norvasc)  5 mg PO BEDTIME [facility]


   Last Admin: 09/17/20 21:20 Dose:  5 mg


   Documented by: 


Aspirin (Halfprin)  81 mg PO DAILY [facility]


   Last Admin: 09/18/20 08:37 Dose:  81 mg


   Documented by: 


Calcitriol (Rocaltrol)  0.25 mcg PO MOFR [facility]


   Last Admin: 09/18/20 08:55 Dose:  0.25 mcg


   Documented by: 


Cholecalciferol (Vitamin D3)  25 mcg PO DAILY [facility]


   Last Admin: 09/18/20 08:33 Dose:  25 mcg


   Documented by: 


Dexamethasone (Dexamethasone)  6 mg PO Q24H [facility]


   Stop: 09/20/20 13:01


   Last Admin: 09/17/20 13:46 Dose:  6 mg


   Documented by: 


Dextrose/Water (Dextrose 50% In Water)  50 ml IVPUSH ASDIRECTED PRN


   PRN Reason: Hypoglycemia


Ferrous Sulfate (Ferrous Sulfate)  324 mg PO Q48H [facility]


   Last Admin: 09/18/20 08:35 Dose:  324 mg


   Documented by: 


Fluticasone Propionate (Flonase)  0 gm NASBOTH DAILY [facility]


   Last Admin: 09/18/20 08:37 Dose:  1 spray


   Documented by: 


Furosemide (Lasix)  20 mg PO DAILY [facility]


   Last Admin: 09/18/20 08:36 Dose:  20 mg


   Documented by: 


Insulin Glargine (Lantus)  10 unit SUBCUT BEDTIME [facility]


   Last Admin: 09/17/20 21:17 Dose:  10 units


   Documented by: 


Insulin Human Lispro (Humalog)  0 unit SUBCUT QIDACANDBED [facility]; Protocol


   Last Admin: 09/18/20 06:31 Dose:  Not Given


   Documented by: 


Metoprolol Succinate (Toprol Xl)  100 mg PO DAILY [facility]


   Last Admin: 09/18/20 08:34 Dose:  100 mg


   Documented by: 


Mometasone Furoate/Formoterol Fumar (Dulera 200-5 Mcg)  2 puff IH BID [facility]


   Last Admin: 09/18/20 10:08 Dose:  2 puff


   Documented by: 


Ethacrynic Acid 50 (Mg **Ptom)  50 mg PO BID [facility]


   Last Admin: 09/18/20 08:38 Dose:  Not Given


   Documented by: 


Ondansetron HCl (Zofran)  4 mg IV Q6H PRN


   PRN Reason: Nausea/Vomiting


   Last Admin: 09/12/20 11:18 Dose:  4 mg


   Documented by: 


Pantoprazole Sodium (Protonix***)  40 mg PO DAILY [facility]


   Last Admin: 09/18/20 08:36 Dose:  40 mg


   Documented by: 


Rivaroxaban (Xarelto)  10 mg PO BEDTIME [facility]


   Last Admin: 09/17/20 21:20 Dose:  10 mg


   Documented by: 


Rosuvastatin Calcium (Crestor)  10 mg PO BEDTIME [facility]


   Last Admin: 09/17/20 21:21 Dose:  10 mg


   Documented by: 


Senna/Docusate Sodium (Senna Plus)  1 tab PO DAILY [facility]


   Last Admin: 09/18/20 08:37 Dose:  1 tab


   Documented by: 


Sertraline HCl (Zoloft)  50 mg PO BEDTIME [facility]


   Last Admin: 09/17/20 21:21 Dose:  50 mg


   Documented by: 


Sodium Chloride (Ayr Saline Nasal Gel)  0 gm RHONA TID PRN


   PRN Reason: Dryness


Spironolactone (Aldactone)  25 mg PO DAILY [facility]


   Last Admin: 09/18/20 08:36 Dose:  25 mg


   Documented by: 


Tamsulosin HCl (Flomax)  0.8 mg PO BEDTIME [facility]


   Last Admin: 09/17/20 21:20 Dose:  0.8 mg


   Documented by: 





Discontinued Medications





Calcitriol (Rocaltrol)  0.25 mcg PO ONETIME ONE


   Stop: 09/11/20 13:01


   Last Admin: 09/11/20 13:09 Dose:  0.25 mcg


   Documented by: 


Dexamethasone (Dexamethasone)  6 mg IVPUSH Q24H [facility]


   Stop: 09/20/20 13:01


   Last Admin: 09/14/20 12:05 Dose:  6 mg


   Documented by: 


Sodium Chloride (Normal Saline)  500 mls @ 1,000 mls/hr IV .BOLUS ONE


   Stop: 09/11/20 04:18


   Last Admin: 09/11/20 04:05 Dose:  1,000 mls/hr


   Documented by: 


Sodium Chloride (Normal Saline)  500 mls @ 1,000 mls/hr IV .BOLUS ONE


   Stop: 09/11/20 05:56


   Last Admin: 09/11/20 05:34 Dose:  1,000 mls/hr


   Documented by: 


Remdesivir 100 mg/ Sodium (Chloride)  100 mls @ 100 mls/hr IV Q24H [facility]


   Stop: 09/15/20 13:59


   Last Admin: 09/15/20 12:38 Dose:  100 mls/hr


   Documented by: 


Remdesivir 200 mg/ Sodium (Chloride)  250 mls @ 250 mls/hr IV ONETIME ONE


   Stop: 09/11/20 13:59


   Last Admin: 09/11/20 13:05 Dose:  250 mls/hr


   Documented by: 











- Exam


General: Alert


HEENT: Pupils Equal, Mucous Membr. Moist/Pink


Neck: Supple


Lungs: Clear to Auscultation, Normal Respiratory Effort


Cardiovascular: Regular Rate, Regular Rhythm


GI/Abdominal Exam: Normal Bowel Sounds, Soft, Non-Tender, No Distention


Extremities: Normal Inspection, Normal Range of Motion, Non-Tender, No Pedal 

Edema, Normal Capillary Refill


Skin: Warm, Dry, Intact


Psy/Mental Status: Alert, Normal Affect, Normal Mood





Sepsis Event Note





- Evaluation


Sepsis Screening Result: No Definite Risk





- Focused Exam


Vital Signs: 


                                   Vital Signs











  Temp Pulse Resp BP Pulse Ox Pulse Ox


 


 09/18/20 11:41  97.3 F  70  16  138/76  94 L 


 


 09/18/20 10:10       95


 


 09/18/20 08:34   72   148/69 H  


 


 09/18/20 07:29  97.9 F  89  20  158/81 H  95 


 


 09/18/20 03:52  97.3 F  69  18  160/83 H  97 














- Problem List & Annotations


(1) Acute on chronic renal failure


SNOMED Code(s): 537784343


   Code(s): N17.9 - ACUTE KIDNEY FAILURE, UNSPECIFIED; N18.9 - CHRONIC KIDNEY 

DISEASE, UNSPECIFIED   Status: Acute   Current Visit: Yes   





(2) Atrial fibrillation with controlled ventricular rate


SNOMED Code(s): 09985282


   Code(s): I48.91 - UNSPECIFIED ATRIAL FIBRILLATION   Status: Acute   Current 

Visit: Yes   





(3) B12 deficiency


SNOMED Code(s): 998379084


   Code(s): E53.8 - DEFICIENCY OF OTHER SPECIFIED B GROUP VITAMINS   Status: 

Acute   Current Visit: Yes   





(4) COVID-19


SNOMED Code(s): 859886268


   Code(s): U07.1 - COVID-19   Status: Acute   Current Visit: Yes   





(5) Congestive heart failure


SNOMED Code(s): 90830263


   Code(s): I50.9 - HEART FAILURE, UNSPECIFIED   Status: Acute   Current Visit: 

Yes   





- Problem List Review


Problem List Initiated/Reviewed/Updated: Yes





- My Orders


Last 24 Hours: 


My Active Orders





09/17/20 21:00


amLODIPine [Norvasc]   5 mg PO BEDTIME 














- Assessment


Assessment:: 





9/11/20


1 day of weakness and low O2 sats


Lives in [place], + COVID patients in past week


Pulse ox on admission 89%


CXR with cardiomegaly only, no lung parenchyma changes


Orthostatic --> given 500ml IVF bolus--> still orthostatic--> 2nd bolus


Lab results:


- Hb 13.2, Hct 40.1


- Na 133, GFR 19, glucose 165


- Troponin negative


Heart rate on admission 98


EKG with a. fib., rate 84


RVR will likely increase O2 needs- COVID positive


GFR baseline 26, 19 on admission


Not on any nephrotoxic meds 


Decreased oral intake


Admission /81


Hypertension home management with metoprolol


Diabetes home management with glimepiride, sitagliptin and insulin


Glucose on admission 165


Smoked 1.5 ppd for 51 years; Quit in 2005 


PLAN


- Start Remdesivir


- Start Dexamethasone


- PRN albuterol


- Continue Symbicort


- Hold metoprolol due to orthostatic hypotension


- Monitor for hypoxemia


- Monitor urine output


- Renally dosed medications


- Repeat labs and replace electrolytes as needed  


- Hold metoprolol


- PRN Hydralazine


- Continue home insulin at same dose for now


- No premeals, adjust with new measurements as needed


- New HbA1c


- Accu-checks before meals and bedtime


- Hypoglycemia protocol


- Hold PO meds


- Continue home omeprazole


- Continue home statin


- Dietary consult


- Continue home sertraline


Patient will be admitted to med surg floor, will be started on Remdesivir and 

dexamethasone. 





9/12/20


No longer on oxygen supplementation since 1:30 PM on 9/11/2020


Vital signs trend


Blood pressure: 117 264/50 3-91


T-max 98.2


Heart rate: 66-98


Pulse ox greater than 89%


Lab results


WBC down from 6.96-4.05


Hemoglobin down from 13.2-11.7


D-dimer stable from 0.72 2.71


Sodium down from 1 33-1 32


GFR up from 19-26


B12 87


BNP up from 5117-5387


CRP 5.2


Iron 26


Glucose -258


A1c 6.9%


PLAN


-Continue Remdesivir day 2 out of 5


-Continue dexamethasone day 2 out of 10


-Continue Symbicort


-PRN albuterol


-Repeat labs as needed


-Continue to hold metoprolol


-Monitor for hypoxemia


-Monitor urine output


-Renally dosed medications


-Repeat labs and replace electrolytes as needed  


-Hold metoprolol


-PRN Hydralazine


-Continue home insulin at same dose for now


-Accu-checks before meals and bedtime


-Hypoglycemia protocol


-Hold PO meds


-Continue home omeprazole


- Continue home statin


Patient will remain admitted for antiviral treatment for COVID.


Length of stay at least 5 days due to length of treatment required for COVID.





9/13/20


Shortness of breath has resolved


Has not required oxygen supplementation the past 24 hours


Vital signs trend


Blood pressure 324517/7289


T-max 98.4


Heart rate 66 to 88/min


Pulse ox greater than 97% on room air


New lab results


WBC up from 4.05-7.54


Hemoglobin up from 11.7-11.9


Platelets up from 193-235


D-dimer down from 0.71 2.5


Sodium stable from 1 


Chloride up from 94-96


GFR up from 26-30


Magnesium up from 1.9-2.1


CRP down from 5.2-2.8


Glucose trend 171-220





9/14/2020


Patient is asymptomatic.


Vital signs are stable and pulse ox is in the mid to upper 90s on room air.


CRP 2.0 and normal d-dimer.


Sodium up to 136.


BUN to creatinine ratio is still elevated at 30.0.


Estimated GFR is 30 and stable.  This is his chronic stable state.


Episode of blood sugar of 300 today.


Started sliding scale insulin.





9/15/2020


Patient is feeling well.


Vital signs stable with oxygenation in the mid upper 90s and afebrile.


Systolic blood pressure in the 160s today


Labs held today.


Blood sugars improved.


Awaiting placement.





9/16/2020


Patient is stable without any significant changes.


Blood pressure is ranging in the 150s up to 170s.  Afebrile.  Oxygen saturation 

on room air ranging from 90 to 100%, weight stable at approximately 66 kg


Hemoglobin and white count are stable at 12.0 and 7.9 respectively.


GFR 36 with creatinine of 1.8 which is improved.


BUN stable at 62.  Elevation likely secondary to protein catabolism from 

dexamethasone.


Blood sugars improved ranging from 120 up to 262


Still awaiting placement


Patient has not been getting his ethacrynic acid because it is nonformulary.





9/17/2020


Blood pressure continues to be elevated.  He was started on low-dose Lasix this 

morning.  He would likely benefit from additional treatment.


Improvement in glucose. But continues to be elevated.  This should resolve once 

he is off of dexamethasone.


No significant change overnight.





9/18/2020


Blood pressure is improved with the addition of amlodipine.


Patient has no complaints.


Glucose continues to be elevated secondary to dexamethasone





- Plan


Plan:: 








COVID-19


COPD (chronic obstructive pulmonary disease)


-Remdesivir completed


-Continue dexamethasone day 8 out of 10


-Continue Symbicort


-PRN albuterol


-Repeat labs as needed





Atrial fibrillation with controlled ventricular rate


-Continue metoprolol


-Monitor for hypoxemia


 


Acute on chronic renal failure 2/2 Volume depletion, improved


Hyponatremia, resolved


Iron deficiency anemia


B12 deficiency


-Monitor urine output


-Renally dosed medications


-Repeat labs and replace electrolytes as needed  





Hypertension


-Restart metoprolol


-Continue Lasix 20 mg daily


-Amlodipine 5 mg at night


-PRN Hydralazine





Diabetes mellitus, LxQ5p-5.9%


-Continue home insulin 


-Accu-checks before meals and bedtime


-Hypoglycemia protocol


-Hold PO meds


- sliding scale insulin


-Expect continued increase in blood sugars secondary to dexamethasone.





Gastroesophageal reflux disease


-Continue home omeprazole





Dyslipidemia


- Continue home statin





Hypoalbuminemia


-Dietary consult





Depression


-Continue home sertraline





Orthostatic, resolved





PROPHYLAXIS


DVT- home Xarelto


GI- home omeprazole





CODE STATUS: FULL CODE





DISPOSITION:


Awaiting placement.

## 2020-09-19 NOTE — PCM.PN
- General Info


Date of Service: 09/19/20


Admission Dx/Problem (Free Text): 


                           Admission Diagnosis/Problem





Admission Diagnosis/Problem      Hypoxia








Subjective Update: 





No significant change overnight.  Blood sugars are still high ranging from 148-

335.  Appetite is good. Blood pressure ranges from 122//56.  Patient has 

no complaints.


Functional Status: Reports: Pain Controlled





- Review of Systems


General: Reports: No Symptoms


HEENT: Reports: No Symptoms


Pulmonary: Reports: No Symptoms


Cardiovascular: Reports: No Symptoms


Gastrointestinal: Reports: No Symptoms


Musculoskeletal: Reports: No Symptoms


Neurological: Reports: No Symptoms


Psychiatric: Reports: No Symptoms





- Patient Data


Vitals - Most Recent: 


                                Last Vital Signs











Temp  98.2 F   09/19/20 05:56


 


Pulse  66   09/19/20 09:17


 


Resp  16   09/18/20 20:57


 


BP  165/56 H  09/19/20 09:17


 


Pulse Ox  96   09/19/20 05:56








                                        





Orthostatic Blood Pressure [     71/53


Standing]                        


Orthostatic Blood Pressure [     100/71


Sitting]                         


Orthostatic Blood Pressure [     121/52


Supine]                          








Weight - Most Recent: 66.315 kg


I&O - Last 24 Hours: 


                                 Intake & Output











 09/18/20 09/19/20 09/19/20





 22:59 06:59 14:59


 


Intake Total 250 660 


 


Balance 250 660 











Lab Results Last 24 Hours: 


                         Laboratory Results - last 24 hr











  09/18/20 09/18/20 09/18/20 Range/Units





  11:45 16:42 20:54 


 


POC Glucose  335 H  156 H  249 H  ()  mg/dL














  09/19/20 Range/Units





  06:06 


 


POC Glucose  178 H  ()  mg/dL











Med Orders - Current: 


                               Current Medications





Acetaminophen (Tylenol)  325 mg PO Q4H PRN


   PRN Reason: Pain (Mild 1-3)/fever


Albuterol/Ipratropium (Duoneb 3.0-0.5 Mg/3 Ml)  3 ml INH Q4H PRN


   PRN Reason: Shortness of Breath


Amlodipine Besylate (Norvasc)  5 mg PO BEDTIME [facility]


   Last Admin: 09/18/20 20:59 Dose:  5 mg


   Documented by: 


Aspirin (Halfprin)  81 mg PO DAILY [facility]


   Last Admin: 09/19/20 09:16 Dose:  81 mg


   Documented by: 


Calcitriol (Rocaltrol)  0.25 mcg PO MOFR [facility]


   Last Admin: 09/18/20 08:55 Dose:  0.25 mcg


   Documented by: 


Cholecalciferol (Vitamin D3)  25 mcg PO DAILY [facility]


   Last Admin: 09/19/20 09:16 Dose:  25 mcg


   Documented by: 


Dexamethasone (Dexamethasone)  6 mg PO Q24H [facility]


   Stop: 09/20/20 13:01


   Last Admin: 09/18/20 12:25 Dose:  6 mg


   Documented by: 


Dextrose/Water (Dextrose 50% In Water)  50 ml IVPUSH ASDIRECTED PRN


   PRN Reason: Hypoglycemia


Ferrous Sulfate (Ferrous Sulfate)  324 mg PO Q48H [facility]


   Last Admin: 09/18/20 08:35 Dose:  324 mg


   Documented by: 


Fluticasone Propionate (Flonase)  0 gm NASBOTH DAILY [facility]


   Last Admin: 09/19/20 09:16 Dose:  1 spray


   Documented by: 


Furosemide (Lasix)  20 mg PO DAILY [facility]


   Last Admin: 09/19/20 09:16 Dose:  20 mg


   Documented by: 


Insulin Glargine (Lantus)  10 unit SUBCUT BEDTIME [facility]


   Last Admin: 09/18/20 21:01 Dose:  10 units


   Documented by: 


Insulin Human Lispro (Humalog)  0 unit SUBCUT QIDACANDBED [facility]; Protocol


   Last Admin: 09/19/20 09:21 Dose:  1 units


   Documented by: 


Metoprolol Succinate (Toprol Xl)  100 mg PO DAILY [facility]


   Last Admin: 09/19/20 09:17 Dose:  100 mg


   Documented by: 


Mometasone Furoate/Formoterol Fumar (Dulera 200-5 Mcg)  2 puff IH BID [facility]


   Last Admin: 09/18/20 20:21 Dose:  2 puff


   Documented by: 


Ethacrynic Acid 50 (Mg **Ptom)  50 mg PO BID [facility]


   Last Admin: 09/19/20 09:17 Dose:  Not Given


   Documented by: 


Ondansetron HCl (Zofran)  4 mg IV Q6H PRN


   PRN Reason: Nausea/Vomiting


   Last Admin: 09/12/20 11:18 Dose:  4 mg


   Documented by: 


Pantoprazole Sodium (Protonix***)  40 mg PO DAILY [facility]


   Last Admin: 09/19/20 09:16 Dose:  40 mg


   Documented by: 


Rivaroxaban (Xarelto)  10 mg PO BEDTIME [facility]


   Last Admin: 09/18/20 20:58 Dose:  10 mg


   Documented by: 


Rosuvastatin Calcium (Crestor)  10 mg PO BEDTIME [facility]


   Last Admin: 09/18/20 21:00 Dose:  10 mg


   Documented by: 


Senna/Docusate Sodium (Senna Plus)  1 tab PO DAILY [facility]


   Last Admin: 09/19/20 09:16 Dose:  1 tab


   Documented by: 


Sertraline HCl (Zoloft)  50 mg PO BEDTIME [facility]


   Last Admin: 09/18/20 21:00 Dose:  50 mg


   Documented by: 


Sodium Chloride (Ayr Saline Nasal Gel)  0 gm RHONA TID PRN


   PRN Reason: Dryness


Spironolactone (Aldactone)  25 mg PO DAILY [facility]


   Last Admin: 09/19/20 09:16 Dose:  25 mg


   Documented by: 


Tamsulosin HCl (Flomax)  0.8 mg PO BEDTIME [facility]


   Last Admin: 09/18/20 20:58 Dose:  0.8 mg


   Documented by: 





Discontinued Medications





Calcitriol (Rocaltrol)  0.25 mcg PO ONETIME ONE


   Stop: 09/11/20 13:01


   Last Admin: 09/11/20 13:09 Dose:  0.25 mcg


   Documented by: 


Dexamethasone (Dexamethasone)  6 mg IVPUSH Q24H [facility]


   Stop: 09/20/20 13:01


   Last Admin: 09/14/20 12:05 Dose:  6 mg


   Documented by: 


Sodium Chloride (Normal Saline)  500 mls @ 1,000 mls/hr IV .BOLUS ONE


   Stop: 09/11/20 04:18


   Last Admin: 09/11/20 04:05 Dose:  1,000 mls/hr


   Documented by: 


Sodium Chloride (Normal Saline)  500 mls @ 1,000 mls/hr IV .BOLUS ONE


   Stop: 09/11/20 05:56


   Last Admin: 09/11/20 05:34 Dose:  1,000 mls/hr


   Documented by: 


Remdesivir 100 mg/ Sodium (Chloride)  100 mls @ 100 mls/hr IV Q24H [facility]


   Stop: 09/15/20 13:59


   Last Admin: 09/15/20 12:38 Dose:  100 mls/hr


   Documented by: 


Remdesivir 200 mg/ Sodium (Chloride)  250 mls @ 250 mls/hr IV ONETIME ONE


   Stop: 09/11/20 13:59


   Last Admin: 09/11/20 13:05 Dose:  250 mls/hr


   Documented by: 











- Exam


Quality Assessment: No: Supplemental Oxygen


General: Alert


HEENT: Pupils Equal, Mucous Membr. Moist/Pink


Neck: Supple


Lungs: Normal Respiratory Effort, Rales (Bibasilar)


Cardiovascular: Regular Rate, Regular Rhythm


GI/Abdominal Exam: Normal Bowel Sounds, Soft, Non-Tender, No Distention


Extremities: Normal Inspection, No Pedal Edema, Normal Capillary Refill


Skin: Warm, Dry, Intact


Neurological: No New Focal Deficit


Psy/Mental Status: Alert, Normal Affect, Normal Mood





Sepsis Event Note





- Evaluation


Sepsis Screening Result: No Definite Risk





- Focused Exam


Vital Signs: 


                                   Vital Signs











  Temp Pulse BP Pulse Ox Pulse Ox


 


 09/19/20 09:17   66  165/56 H  


 


 09/19/20 05:56  98.2 F  102 H  127/77  96 


 


 09/18/20 22:00      95














- Problem List & Annotations


(1) Acute on chronic renal failure


SNOMED Code(s): 110004691


   Code(s): N17.9 - ACUTE KIDNEY FAILURE, UNSPECIFIED; N18.9 - CHRONIC KIDNEY 

DISEASE, UNSPECIFIED   Status: Acute   Current Visit: Yes   





(2) Atrial fibrillation with controlled ventricular rate


SNOMED Code(s): 43929057


   Code(s): I48.91 - UNSPECIFIED ATRIAL FIBRILLATION   Status: Acute   Current V

isit: Yes   





(3) B12 deficiency


SNOMED Code(s): 824500298


   Code(s): E53.8 - DEFICIENCY OF OTHER SPECIFIED B GROUP VITAMINS   Status: 

Acute   Current Visit: Yes   





(4) COVID-19


SNOMED Code(s): 572202657


   Code(s): U07.1 - COVID-19   Status: Acute   Current Visit: Yes   





(5) Congestive heart failure


SNOMED Code(s): 98805283


   Code(s): I50.9 - HEART FAILURE, UNSPECIFIED   Status: Acute   Current Visit: 

Yes   





- Problem List Review


Problem List Initiated/Reviewed/Updated: Yes





- My Orders


Last 24 Hours: 


My Active Orders





09/18/20 17:38


Communication Order [RC] DAILY 














- Assessment


Assessment:: 





9/11/20


1 day of weakness and low O2 sats


Lives in [place], + COVID patients in past week


Pulse ox on admission 89%


CXR with cardiomegaly only, no lung parenchyma changes


Orthostatic --> given 500ml IVF bolus--> still orthostatic--> 2nd bolus


Lab results:


- Hb 13.2, Hct 40.1


- Na 133, GFR 19, glucose 165


- Troponin negative


Heart rate on admission 98


EKG with a. fib., rate 84


RVR will likely increase O2 needs- COVID positive


GFR baseline 26, 19 on admission


Not on any nephrotoxic meds 


Decreased oral intake


Admission /81


Hypertension home management with metoprolol


Diabetes home management with glimepiride, sitagliptin and insulin


Glucose on admission 165


Smoked 1.5 ppd for 51 years; Quit in 2005 


PLAN


- Start Remdesivir


- Start Dexamethasone


- PRN albuterol


- Continue Symbicort


- Hold metoprolol due to orthostatic hypotension


- Monitor for hypoxemia


- Monitor urine output


- Renally dosed medications


- Repeat labs and replace electrolytes as needed  


- Hold metoprolol


- PRN Hydralazine


- Continue home insulin at same dose for now


- No premeals, adjust with new measurements as needed


- New HbA1c


- Accu-checks before meals and bedtime


- Hypoglycemia protocol


- Hold PO meds


- Continue home omeprazole


- Continue home statin


- Dietary consult


- Continue home sertraline


Patient will be admitted to med surg floor, will be started on Remdesivir and 

dexamethasone. 





9/12/20


No longer on oxygen supplementation since 1:30 PM on 9/11/2020


Vital signs trend


Blood pressure: 117 264/50 3-91


T-max 98.2


Heart rate: 66-98


Pulse ox greater than 89%


Lab results


WBC down from 6.96-4.05


Hemoglobin down from 13.2-11.7


D-dimer stable from 0.72 2.71


Sodium down from 1 33-1 32


GFR up from 19-26


B12 87


BNP up from 8660-9748


CRP 5.2


Iron 26


Glucose -258


A1c 6.9%


PLAN


-Continue Remdesivir day 2 out of 5


-Continue dexamethasone day 2 out of 10


-Continue Symbicort


-PRN albuterol


-Repeat labs as needed


-Continue to hold metoprolol


-Monitor for hypoxemia


-Monitor urine output


-Renally dosed medications


-Repeat labs and replace electrolytes as needed  


-Hold metoprolol


-PRN Hydralazine


-Continue home insulin at same dose for now


-Accu-checks before meals and bedtime


-Hypoglycemia protocol


-Hold PO meds


-Continue home omeprazole


- Continue home statin


Patient will remain admitted for antiviral treatment for COVID.


Length of stay at least 5 days due to length of treatment required for COVID.





9/13/20


Shortness of breath has resolved


Has not required oxygen supplementation the past 24 hours


Vital signs trend


Blood pressure 021337/7289


T-max 98.4


Heart rate 66 to 88/min


Pulse ox greater than 97% on room air


New lab results


WBC up from 4.05-7.54


Hemoglobin up from 11.7-11.9


Platelets up from 193-235


D-dimer down from 0.71 2.5


Sodium stable from 1 


Chloride up from 94-96


GFR up from 26-30


Magnesium up from 1.9-2.1


CRP down from 5.2-2.8


Glucose trend 171-220





9/14/2020


Patient is asymptomatic.


Vital signs are stable and pulse ox is in the mid to upper 90s on room air.


CRP 2.0 and normal d-dimer.


Sodium up to 136.


BUN to creatinine ratio is still elevated at 30.0.


Estimated GFR is 30 and stable.  This is his chronic stable state.


Episode of blood sugar of 300 today.


Started sliding scale insulin.





9/15/2020


Patient is feeling well.


Vital signs stable with oxygenation in the mid upper 90s and afebrile.


Systolic blood pressure in the 160s today


Labs held today.


Blood sugars improved.


Awaiting placement.





9/16/2020


Patient is stable without any significant changes.


Blood pressure is ranging in the 150s up to 170s.  Afebrile.  Oxygen saturation 

on room air ranging from 90 to 100%, weight stable at approximately 66 kg


Hemoglobin and white count are stable at 12.0 and 7.9 respectively.


GFR 36 with creatinine of 1.8 which is improved.


BUN stable at 62.  Elevation likely secondary to protein catabolism from 

dexamethasone.


Blood sugars improved ranging from 120 up to 262


Still awaiting placement


Patient has not been getting his ethacrynic acid because it is nonformulary.





9/17/2020


Blood pressure continues to be elevated.  He was started on low-dose Lasix this 

morning.  He would likely benefit from additional treatment.


Improvement in glucose. But continues to be elevated.  This should resolve once 

he is off of dexamethasone.


No significant change overnight.





9/18/2020


Blood pressure is improved with the addition of amlodipine.


Patient has no complaints.


Glucose continues to be elevated secondary to dexamethasone





9/18/2020


Blood pressure is improved but continues to be elevated.


Blood pressure ranges from 122//56


Fingerstick blood sugars also continue to be elevated.


Still off oxygen and generally doing either better or stable on all parameters.





- Plan


Plan:: 








COVID-19


COPD (chronic obstructive pulmonary disease)


-Remdesivir completed


-Continue dexamethasone day 9 out of 10


-Continue Symbicort


-PRN albuterol


-Repeat labs as needed





Atrial fibrillation with controlled ventricular rate


-Continue metoprolol


-Monitor for hypoxemia


 


Acute on chronic renal failure 2/2 Volume depletion, improved


Hyponatremia, resolved


Iron deficiency anemia


B12 deficiency


-Monitor urine output


-Renally dosed medications


-Repeat labs and replace electrolytes as needed  





Hypertension


-Restart metoprolol


-Continue Lasix 20 mg daily


-Amlodipine 5 mg at night


-PRN Hydralazine





Diabetes mellitus, [id number]-9.9%


-Continue home insulin 


-Accu-checks before meals and bedtime


-Hypoglycemia protocol


-Hold PO meds


- sliding scale insulin


-Expect continued increase in blood sugars secondary to dexamethasone.


-Increase glargine and extra 3 units in the morning.


-Continue glargine 10 units in the evening





Gastroesophageal reflux disease


-Continue home omeprazole





Dyslipidemia


- Continue home statin





Hypoalbuminemia


-Dietary consult





Depression


-Continue home sertraline





Orthostatic, resolved





PROPHYLAXIS


DVT- home Xarelto


GI- home omeprazole





CODE STATUS: FULL CODE





DISPOSITION:


Awaiting placement.

## 2020-09-20 NOTE — PCM.PN
- General Info


Date of Service: 09/20/20


Admission Dx/Problem (Free Text): 


                           Admission Diagnosis/Problem





Admission Diagnosis/Problem      Hypoxia








Subjective Update: 





Bennett is feeling well without any complaints.  He is still off oxygen.  Blood 

sugars continue to be elevated secondary to dexamethasone.  Dexamethasone 

stopped today at 9 out of 10 days because of no marginal benefit with increased 

risk secondary to hyperglycemia.


Functional Status: Reports: Pain Controlled





- Review of Systems


General: Reports: No Symptoms


HEENT: Reports: No Symptoms


Pulmonary: Reports: No Symptoms


Cardiovascular: Reports: No Symptoms


Gastrointestinal: Reports: No Symptoms


Musculoskeletal: Reports: No Symptoms





- Patient Data


Vitals - Most Recent: 


                                Last Vital Signs











Temp  98.4 F   09/19/20 23:14


 


Pulse  81   09/20/20 08:16


 


Resp  17   09/19/20 23:14


 


BP  155/70 H  09/20/20 08:16


 


Pulse Ox  96   09/19/20 23:14








                                        





Orthostatic Blood Pressure [     71/53


Standing]                        


Orthostatic Blood Pressure [     100/71


Sitting]                         


Orthostatic Blood Pressure [     121/52


Supine]                          








Weight - Most Recent: 63.775 kg


I&O - Last 24 Hours: 


                                 Intake & Output











 09/19/20 09/20/20 09/20/20





 22:59 06:59 14:59


 


Intake Total 730 360 


 


Balance 730 360 











Lab Results Last 24 Hours: 


                         Laboratory Results - last 24 hr











  09/19/20 09/19/20 09/19/20 Range/Units





  11:32 17:39 21:44 


 


WBC     (4.23-9.07)  K/mm3


 


RBC     (4.63-6.08)  M/mm3


 


Hgb     (13.7-17.5)  gm/dl


 


Hct     (40.1-51.0)  %


 


MCV     (79.0-92.2)  fl


 


MCH     (25.7-32.2)  pg


 


MCHC     (32.2-35.5)  g/dl


 


RDW Std Deviation     (35.1-43.9)  fL


 


Plt Count     (163-337)  K/mm3


 


MPV     (9.4-12.3)  fl


 


Neut % (Auto)     (34.0-67.9)  %


 


Lymph % (Auto)     (21.8-53.1)  %


 


Mono % (Auto)     (5.3-12.2)  %


 


Eos % (Auto)     (0.8-7.0)  


 


Baso % (Auto)     (0.1-1.2)  %


 


Neut # (Auto)     (1.78-5.38)  K/mm3


 


Lymph # (Auto)     (1.32-3.57)  K/mm3


 


Mono # (Auto)     (0.30-0.82)  K/mm3


 


Eos # (Auto)     (0.04-0.54)  K/mm3


 


Baso # (Auto)     (0.01-0.08)  K/mm3


 


Manual Slide Review     


 


Sodium     (136-145)  mEq/L


 


Potassium     (3.5-5.1)  mEq/L


 


Chloride     ()  mEq/L


 


Carbon Dioxide     (21-32)  mEq/L


 


Anion Gap     (5-15)  


 


BUN     (7-18)  mg/dL


 


Creatinine     (0.7-1.3)  mg/dL


 


Est Cr Clr Drug Dosing     mL/min


 


Estimated GFR (MDRD)     (>60)  mL/min


 


BUN/Creatinine Ratio     (14-18)  


 


Glucose     ()  mg/dL


 


POC Glucose  230 H  350 H  324 H  ()  mg/dL


 


Calcium     (8.5-10.1)  mg/dL


 


Phosphorus     (2.6-4.7)  mg/dL


 


Magnesium     (1.8-2.4)  mg/dl


 


Total Bilirubin     (0.2-1.0)  mg/dL


 


AST     (15-37)  U/L


 


ALT     (16-63)  U/L


 


Alkaline Phosphatase     ()  U/L


 


Total Protein     (6.4-8.2)  g/dl


 


Albumin     (3.4-5.0)  g/dl


 


Globulin     gm/dL


 


Albumin/Globulin Ratio     (1-2)  














  09/20/20 09/20/20 09/20/20 Range/Units





  06:00 06:00 06:27 


 


WBC  15.25 H    (4.23-9.07)  K/mm3


 


RBC  4.57 L    (4.63-6.08)  M/mm3


 


Hgb  13.6 L D    (13.7-17.5)  gm/dl


 


Hct  40.4    (40.1-51.0)  %


 


MCV  88.4    (79.0-92.2)  fl


 


MCH  29.8    (25.7-32.2)  pg


 


MCHC  33.7    (32.2-35.5)  g/dl


 


RDW Std Deviation  44.2 H    (35.1-43.9)  fL


 


Plt Count  379 H D    (163-337)  K/mm3


 


MPV  8.6 L    (9.4-12.3)  fl


 


Neut % (Auto)  87.3 H    (34.0-67.9)  %


 


Lymph % (Auto)  4.3 L    (21.8-53.1)  %


 


Mono % (Auto)  7.3    (5.3-12.2)  %


 


Eos % (Auto)  0 L    (0.8-7.0)  


 


Baso % (Auto)  0.1    (0.1-1.2)  %


 


Neut # (Auto)  13.31 H    (1.78-5.38)  K/mm3


 


Lymph # (Auto)  0.65 L    (1.32-3.57)  K/mm3


 


Mono # (Auto)  1.12 H    (0.30-0.82)  K/mm3


 


Eos # (Auto)  0.00 L    (0.04-0.54)  K/mm3


 


Baso # (Auto)  0.01    (0.01-0.08)  K/mm3


 


Manual Slide Review  Abnormal smear    


 


Sodium   136   (136-145)  mEq/L


 


Potassium   4.2   (3.5-5.1)  mEq/L


 


Chloride   99   ()  mEq/L


 


Carbon Dioxide   27   (21-32)  mEq/L


 


Anion Gap   14.2   (5-15)  


 


BUN   61 H   (7-18)  mg/dL


 


Creatinine   1.9 H   (0.7-1.3)  mg/dL


 


Est Cr Clr Drug Dosing   27.51   mL/min


 


Estimated GFR (MDRD)   34   (>60)  mL/min


 


BUN/Creatinine Ratio   32.1 H   (14-18)  


 


Glucose   186 H   ()  mg/dL


 


POC Glucose    192 H  ()  mg/dL


 


Calcium   9.2   (8.5-10.1)  mg/dL


 


Phosphorus   4.0   (2.6-4.7)  mg/dL


 


Magnesium   2.1   (1.8-2.4)  mg/dl


 


Total Bilirubin   1.1 H   (0.2-1.0)  mg/dL


 


AST   14 L   (15-37)  U/L


 


ALT   26   (16-63)  U/L


 


Alkaline Phosphatase   65   ()  U/L


 


Total Protein   7.0   (6.4-8.2)  g/dl


 


Albumin   3.1 L   (3.4-5.0)  g/dl


 


Globulin   3.9   gm/dL


 


Albumin/Globulin Ratio   0.8 L   (1-2)  











Med Orders - Current: 


                               Current Medications





Acetaminophen (Tylenol)  325 mg PO Q4H PRN


   PRN Reason: Pain (Mild 1-3)/fever


Albuterol/Ipratropium (Duoneb 3.0-0.5 Mg/3 Ml)  3 ml INH Q4H PRN


   PRN Reason: Shortness of Breath


Amlodipine Besylate (Norvasc)  5 mg PO BEDTIME [facility]


   Last Admin: 09/19/20 21:41 Dose:  5 mg


   Documented by: 


Aspirin (Halfprin)  81 mg PO DAILY [facility]


   Last Admin: 09/20/20 08:16 Dose:  81 mg


   Documented by: 


Calcitriol (Rocaltrol)  0.25 mcg PO MOFR [facility]


   Last Admin: 09/18/20 08:55 Dose:  0.25 mcg


   Documented by: 


Cholecalciferol (Vitamin D3)  25 mcg PO DAILY [facility]


   Last Admin: 09/20/20 08:15 Dose:  25 mcg


   Documented by: 


Dextrose/Water (Dextrose 50% In Water)  50 ml IVPUSH ASDIRECTED PRN


   PRN Reason: Hypoglycemia


Ferrous Sulfate (Ferrous Sulfate)  324 mg PO Q48H [facility]


   Last Admin: 09/20/20 08:17 Dose:  324 mg


   Documented by: 


Fluticasone Propionate (Flonase)  0 gm NASBOTH DAILY [facility]


   Last Admin: 09/20/20 08:17 Dose:  1 spray


   Documented by: 


Furosemide (Lasix)  20 mg PO DAILY [facility]


   Last Admin: 09/20/20 08:16 Dose:  20 mg


   Documented by: 


Insulin Glargine (Lantus)  10 unit SUBCUT BEDTIME [facility]


   Last Admin: 09/19/20 21:50 Dose:  10 units


   Documented by: 


Insulin Glargine (Lantus)  3 unit SUBCUT DAILY [facility]


   Last Admin: 09/20/20 08:15 Dose:  3 units


   Documented by: 


Insulin Human Lispro (Humalog)  0 unit SUBCUT QIDACANDBED [facility]; Protocol


   Last Admin: 09/20/20 08:14 Dose:  1 units


   Documented by: 


Metoprolol Succinate (Toprol Xl)  100 mg PO DAILY [facility]


   Last Admin: 09/20/20 08:16 Dose:  100 mg


   Documented by: 


Mometasone Furoate/Formoterol Fumar (Dulera 200-5 Mcg)  2 puff IH BID [facility]


   Last Admin: 09/19/20 20:23 Dose:  2 puff


   Documented by: 


Ethacrynic Acid 50 (Mg **Ptom)  50 mg PO BID [facility]


   Last Admin: 09/20/20 08:17 Dose:  Not Given


   Documented by: 


Ondansetron HCl (Zofran)  4 mg IV Q6H PRN


   PRN Reason: Nausea/Vomiting


   Last Admin: 09/12/20 11:18 Dose:  4 mg


   Documented by: 


Pantoprazole Sodium (Protonix***)  40 mg PO DAILY [facility]


   Last Admin: 09/20/20 08:15 Dose:  40 mg


   Documented by: 


Rivaroxaban (Xarelto)  10 mg PO BEDTIME [facility]


   Last Admin: 09/19/20 21:30 Dose:  10 mg


   Documented by: 


Rosuvastatin Calcium (Crestor)  10 mg PO BEDTIME [facility]


   Last Admin: 09/19/20 21:30 Dose:  10 mg


   Documented by: 


Senna/Docusate Sodium (Senna Plus)  1 tab PO DAILY [facility]


   Last Admin: 09/20/20 08:19 Dose:  Not Given


   Documented by: 


Sertraline HCl (Zoloft)  50 mg PO BEDTIME [facility]


   Last Admin: 09/19/20 21:41 Dose:  50 mg


   Documented by: 


Sodium Chloride (Ayr Saline Nasal Gel)  0 gm RHONA TID PRN


   PRN Reason: Dryness


Spironolactone (Aldactone)  25 mg PO DAILY [facility]


   Last Admin: 09/20/20 08:16 Dose:  25 mg


   Documented by: 


Tamsulosin HCl (Flomax)  0.8 mg PO BEDTIME [facility]


   Last Admin: 09/19/20 21:46 Dose:  0.8 mg


   Documented by: 





Discontinued Medications





Calcitriol (Rocaltrol)  0.25 mcg PO ONETIME ONE


   Stop: 09/11/20 13:01


   Last Admin: 09/11/20 13:09 Dose:  0.25 mcg


   Documented by: 


Dexamethasone (Dexamethasone)  6 mg IVPUSH Q24H [facility]


   Stop: 09/20/20 13:01


   Last Admin: 09/14/20 12:05 Dose:  6 mg


   Documented by: 


Dexamethasone (Dexamethasone)  6 mg PO Q24H [facility]


   Stop: 09/20/20 13:01


   Last Admin: 09/19/20 12:16 Dose:  6 mg


   Documented by: 


Sodium Chloride (Normal Saline)  500 mls @ 1,000 mls/hr IV .BOLUS ONE


   Stop: 09/11/20 04:18


   Last Admin: 09/11/20 04:05 Dose:  1,000 mls/hr


   Documented by: 


Sodium Chloride (Normal Saline)  500 mls @ 1,000 mls/hr IV .BOLUS ONE


   Stop: 09/11/20 05:56


   Last Admin: 09/11/20 05:34 Dose:  1,000 mls/hr


   Documented by: 


Remdesivir 100 mg/ Sodium (Chloride)  100 mls @ 100 mls/hr IV Q24H [facility]


   Stop: 09/15/20 13:59


   Last Admin: 09/15/20 12:38 Dose:  100 mls/hr


   Documented by: 


Remdesivir 200 mg/ Sodium (Chloride)  250 mls @ 250 mls/hr IV ONETIME ONE


   Stop: 09/11/20 13:59


   Last Admin: 09/11/20 13:05 Dose:  250 mls/hr


   Documented by: 











- Exam


Quality Assessment: No: Supplemental Oxygen


General: Alert


HEENT: Pupils Equal, Mucous Membr. Moist/Pink


Neck: Supple


Lungs: Normal Respiratory Effort, Rales (Bibasilar)


Cardiovascular: Regular Rate, Regular Rhythm


GI/Abdominal Exam: Normal Bowel Sounds, Soft, Non-Tender, No Distention


Back Exam: Normal Inspection, Full Range of Motion


Extremities: Normal Inspection, Normal Range of Motion, Non-Tender, No Pedal 

Edema, Normal Capillary Refill


Skin: Warm, Dry, Intact


Psy/Mental Status: Alert, Normal Affect, Normal Mood





Sepsis Event Note





- Evaluation


Sepsis Screening Result: No Definite Risk





- Focused Exam


Vital Signs: 


                                   Vital Signs











  Temp Pulse Resp BP Pulse Ox


 


 09/20/20 08:16   81   155/70 H 


 


 09/19/20 23:14  98.4 F  75  17  149/61 H  96


 


 09/19/20 21:47  97.5 F  76  18  165/135 H  97


 


 09/19/20 21:41     165/135 H 














- Problem List & Annotations


(1) Acute on chronic renal failure


SNOMED Code(s): 251850544


   Code(s): N17.9 - ACUTE KIDNEY FAILURE, UNSPECIFIED; N18.9 - CHRONIC KIDNEY 

DISEASE, UNSPECIFIED   Status: Acute   Current Visit: Yes   





(2) Atrial fibrillation with controlled ventricular rate


SNOMED Code(s): 42877012


   Code(s): I48.91 - UNSPECIFIED ATRIAL FIBRILLATION   Status: Acute   Current 

Visit: Yes   





(3) B12 deficiency


SNOMED Code(s): 035874119


   Code(s): E53.8 - DEFICIENCY OF OTHER SPECIFIED B GROUP VITAMINS   Status: 

Acute   Current Visit: Yes   





(4) COVID-19


SNOMED Code(s): 974177154


   Code(s): U07.1 - COVID-19   Status: Acute   Current Visit: Yes   





(5) Congestive heart failure


SNOMED Code(s): 78719463


   Code(s): I50.9 - HEART FAILURE, UNSPECIFIED   Status: Acute   Current Visit: 

Yes   





- Problem List Review


Problem List Initiated/Reviewed/Updated: Yes





- My Orders


Last 24 Hours: 


My Active Orders





09/19/20 09:45


Insulin Glarg,Human.Rec.Analog [LantUS]   3 unit SUBCUT DAILY 














- Assessment


Assessment:: 





9/11/20


1 day of weakness and low O2 sats


Lives in [place], + COVID patients in past week


Pulse ox on admission 89%


CXR with cardiomegaly only, no lung parenchyma changes


Orthostatic --> given 500ml IVF bolus--> still orthostatic--> 2nd bolus


Lab results:


- Hb 13.2, Hct 40.1


- Na 133, GFR 19, glucose 165


- Troponin negative


Heart rate on admission 98


EKG with a. fib., rate 84


RVR will likely increase O2 needs- COVID positive


GFR baseline 26, 19 on admission


Not on any nephrotoxic meds 


Decreased oral intake


Admission /81


Hypertension home management with metoprolol


Diabetes home management with glimepiride, sitagliptin and insulin


Glucose on admission 165


Smoked 1.5 ppd for 51 years; Quit in 2005 


PLAN


- Start Remdesivir


- Start Dexamethasone


- PRN albuterol


- Continue Symbicort


- Hold metoprolol due to orthostatic hypotension


- Monitor for hypoxemia


- Monitor urine output


- Renally dosed medications


- Repeat labs and replace electrolytes as needed  


- Hold metoprolol


- PRN Hydralazine


- Continue home insulin at same dose for now


- No premeals, adjust with new measurements as needed


- New HbA1c


- Accu-checks before meals and bedtime


- Hypoglycemia protocol


- Hold PO meds


- Continue home omeprazole


- Continue home statin


- Dietary consult


- Continue home sertraline


Patient will be admitted to med surg floor, will be started on Remdesivir and 

dexamethasone. 





9/12/20


No longer on oxygen supplementation since 1:30 PM on 9/11/2020


Vital signs trend


Blood pressure: 117 264/50 3-91


T-max 98.2


Heart rate: 66-98


Pulse ox greater than 89%


Lab results


WBC down from 6.96-4.05


Hemoglobin down from 13.2-11.7


D-dimer stable from 0.72 2.71


Sodium down from 1 33-1 32


GFR up from 19-26


B12 87


BNP up from 1383-8231


CRP 5.2


Iron 26


Glucose -258


A1c 6.9%


PLAN


-Continue Remdesivir day 2 out of 5


-Continue dexamethasone day 2 out of 10


-Continue Symbicort


-PRN albuterol


-Repeat labs as needed


-Continue to hold metoprolol


-Monitor for hypoxemia


-Monitor urine output


-Renally dosed medications


-Repeat labs and replace electrolytes as needed  


-Hold metoprolol


-PRN Hydralazine


-Continue home insulin at same dose for now


-Accu-checks before meals and bedtime


-Hypoglycemia protocol


-Hold PO meds


-Continue home omeprazole


- Continue home statin


Patient will remain admitted for antiviral treatment for COVID.


Length of stay at least 5 days due to length of treatment required for COVID.





9/13/20


Shortness of breath has resolved


Has not required oxygen supplementation the past 24 hours


Vital signs trend


Blood pressure 137049/7289


T-max 98.4


Heart rate 66 to 88/min


Pulse ox greater than 97% on room air


New lab results


WBC up from 4.05-7.54


Hemoglobin up from 11.7-11.9


Platelets up from 193-235


D-dimer down from 0.71 2.5


Sodium stable from 1 


Chloride up from 94-96


GFR up from 26-30


Magnesium up from 1.9-2.1


CRP down from 5.2-2.8


Glucose trend 171-220





9/14/2020


Patient is asymptomatic.


Vital signs are stable and pulse ox is in the mid to upper 90s on room air.


CRP 2.0 and normal d-dimer.


Sodium up to 136.


BUN to creatinine ratio is still elevated at 30.0.


Estimated GFR is 30 and stable.  This is his chronic stable state.


Episode of blood sugar of 300 today.


Started sliding scale insulin.





9/15/2020


Patient is feeling well.


Vital signs stable with oxygenation in the mid upper 90s and afebrile.


Systolic blood pressure in the 160s today


Labs held today.


Blood sugars improved.


Awaiting placement.





9/16/2020


Patient is stable without any significant changes.


Blood pressure is ranging in the 150s up to 170s.  Afebrile.  Oxygen saturation 

on room air ranging from 90 to 100%, weight stable at approximately 66 kg


Hemoglobin and white count are stable at 12.0 and 7.9 respectively.


GFR 36 with creatinine of 1.8 which is improved.


BUN stable at 62.  Elevation likely secondary to protein catabolism from 

dexamethasone.


Blood sugars improved ranging from 120 up to 262


Still awaiting placement


Patient has not been getting his ethacrynic acid because it is nonformulary.





9/17/2020


Blood pressure continues to be elevated.  He was started on low-dose Lasix this 

morning.  He would likely benefit from additional treatment.


Improvement in glucose. But continues to be elevated.  This should resolve once 

he is off of dexamethasone.


No significant change overnight.





9/18/2020


Blood pressure is improved with the addition of amlodipine.


Patient has no complaints.


Glucose continues to be elevated secondary to dexamethasone





9/19/2020


Blood pressure is improved but continues to be elevated.


Blood pressure ranges from 122//56


Fingerstick blood sugars also continue to be elevated.


Still off oxygen and generally doing either better or stable on all parameters.





9/20/2020


Blood pressure improved with the last few systolic blood pressures between 149 

and 155


Blood sugars continue to be elevated likely secondary to dexamethasone


Estimated GFR 34.


BUN and creatinine both stable at 61 and 1.9, respectively








- Plan


Plan:: 








COVID-19


COPD (chronic obstructive pulmonary disease)


-Remdesivir completed


-Discontinue dexamethasone secondary to severely hyperglycemic without significa

nt benefit to outcome


-Continue Symbicort


-PRN albuterol


-Repeat labs as needed





Atrial fibrillation with controlled ventricular rate


-Continue metoprolol


-Monitor for hypoxemia


 


Acute on chronic renal failure 2/2 Volume depletion, improved


Hyponatremia, resolved


Iron deficiency anemia


B12 deficiency


-Monitor urine output


-Renally dosed medications


-Repeat labs and replace electrolytes as needed  





Hypertension


-Metoprolol


-Continue Lasix 20 mg daily


-Amlodipine 5 mg at night


-PRN Hydralazine





Diabetes mellitus, SkO6v-9.9%


-Continue home insulin 


-Accu-checks before meals and bedtime


-Hypoglycemia protocol


-Hold PO meds


- sliding scale insulin


-Expect continued increase in blood sugars secondary to dexamethasone.


-Increase glargine and extra 3 units in the morning.


-Continue glargine 10 units in the evening





Gastroesophageal reflux disease


-Continue home omeprazole





Dyslipidemia


- Continue home statin





Hypoalbuminemia


-Dietary consult





Depression


-Continue home sertraline





Orthostatic, resolved





PROPHYLAXIS


DVT- home Xarelto


GI- home omeprazole





CODE STATUS: FULL CODE





DISPOSITION:


Awaiting placement.

## 2020-09-21 NOTE — PCM.PN
- General Info


Date of Service: 09/21/20


Admission Dx/Problem (Free Text): 


                           Admission Diagnosis/Problem





Admission Diagnosis/Problem      Hypoxia








Subjective Update: 





Bennett is feeling well without any complaints.  He is still off oxygen.  Was 

hypoglycemic this morning with a blood sugar of 82.  Decadron was stopped 

yesterday.  Insulin dose was held this am.  


Functional Status: Reports: Pain Controlled, Tolerating Diet





- Review of Systems


General: Reports: Weakness (generalized. )


HEENT: Reports: No Symptoms


Pulmonary: Reports: Cough.  Denies: Shortness of Breath, Pleuritic Chest Pain


Cardiovascular: Reports: No Symptoms.  Denies: Chest Pain, Palpitations, Edema


Gastrointestinal: Reports: No Symptoms.  Denies: Abdominal Pain


Genitourinary: Reports: No Symptoms


Musculoskeletal: Reports: No Symptoms


Skin: Reports: No Symptoms


Neurological: Reports: No Symptoms


Psychiatric: Reports: No Symptoms





- Patient Data


Vitals - Most Recent: 


                                Last Vital Signs











Temp  97.9 F   09/21/20 07:43


 


Pulse  64   09/21/20 08:31


 


Resp  16   09/21/20 07:43


 


BP  148/45 H  09/21/20 08:31


 


Pulse Ox  99   09/21/20 07:43








                                        





Orthostatic Blood Pressure [     71/53


Standing]                        


Orthostatic Blood Pressure [     100/71


Sitting]                         


Orthostatic Blood Pressure [     121/52


Supine]                          








Weight - Most Recent: 144 lb 12.8 oz


I&O - Last 24 Hours: 


                                 Intake & Output











 09/20/20 09/21/20 09/21/20





 22:59 06:59 14:59


 


Intake Total 1080 420 


 


Output Total  2 


 


Balance 1080 418 











Lab Results Last 24 Hours: 


                         Laboratory Results - last 24 hr











  09/20/20 09/20/20 09/20/20 Range/Units





  12:09 16:22 20:29 


 


POC Glucose  239 H  193 H  192 H  ()  mg/dL














  09/21/20 Range/Units





  06:22 


 


POC Glucose  82 L  ()  mg/dL











Med Orders - Current: 


                               Current Medications





Acetaminophen (Tylenol)  325 mg PO Q4H PRN


   PRN Reason: Pain (Mild 1-3)/fever


Albuterol/Ipratropium (Duoneb 3.0-0.5 Mg/3 Ml)  3 ml INH Q4H PRN


   PRN Reason: Shortness of Breath


Amlodipine Besylate (Norvasc)  5 mg PO BEDTIME [facility]


Aspirin (Halfprin)  81 mg PO DAILY [facility]


   Last Admin: 09/21/20 08:31 Dose:  81 mg


   Documented by: 


Calcitriol (Rocaltrol)  0.25 mcg PO MOFR [facility]


   Last Admin: 09/21/20 11:17 Dose:  0.25 mcg


   Documented by: 


Cholecalciferol (Vitamin D3)  25 mcg PO DAILY [facility]


   Last Admin: 09/21/20 08:31 Dose:  25 mcg


   Documented by: 


Dextrose/Water (Dextrose 50% In Water)  50 ml IVPUSH ASDIRECTED PRN


   PRN Reason: Hypoglycemia


Ferrous Sulfate (Ferrous Sulfate)  324 mg PO Q48H [facility]


   Last Admin: 09/20/20 08:17 Dose:  324 mg


   Documented by: 


Fluticasone Propionate (Flonase)  0 gm NASBOTH DAILY [facility]


   Last Admin: 09/21/20 08:34 Dose:  1 spray


   Documented by: 


Furosemide (Lasix)  20 mg PO DAILY [facility]


   Last Admin: 09/21/20 08:32 Dose:  20 mg


   Documented by: 


Insulin Glargine (Lantus)  10 unit SUBCUT BEDTIME [facility]


   Last Admin: 09/20/20 20:33 Dose:  10 units


   Documented by: 


Insulin Human Lispro (Humalog)  0 unit SUBCUT QIDACANDBED [facility]; Protocol


   Last Admin: 09/21/20 08:00 Dose:  Not Given


   Documented by: 


Metoprolol Succinate (Toprol Xl)  100 mg PO DAILY [facility]


   Last Admin: 09/21/20 08:31 Dose:  100 mg


   Documented by: 


Mometasone Furoate/Formoterol Fumar (Dulera 200-5 Mcg)  2 puff IH BID [facility]


   Last Admin: 09/21/20 08:08 Dose:  2 puff


   Documented by: 


Ethacrynic Acid 50 (Mg **Ptom)  50 mg PO BID [facility]


   Last Admin: 09/21/20 08:35 Dose:  Not Given


   Documented by: 


Ondansetron HCl (Zofran)  4 mg IV Q6H PRN


   PRN Reason: Nausea/Vomiting


   Last Admin: 09/12/20 11:18 Dose:  4 mg


   Documented by: 


Pantoprazole Sodium (Protonix***)  40 mg PO DAILY [facility]


   Last Admin: 09/21/20 08:32 Dose:  40 mg


   Documented by: 


Rivaroxaban (Xarelto)  10 mg PO BEDTIME [facility]


   Last Admin: 09/20/20 20:36 Dose:  10 mg


   Documented by: 


Rosuvastatin Calcium (Crestor)  10 mg PO BEDTIME [facility]


   Last Admin: 09/20/20 20:35 Dose:  10 mg


   Documented by: 


Senna/Docusate Sodium (Senna Plus)  1 tab PO DAILY [facility]


   Last Admin: 09/21/20 08:32 Dose:  1 tab


   Documented by: 


Sertraline HCl (Zoloft)  50 mg PO BEDTIME [facility]


   Last Admin: 09/20/20 20:34 Dose:  50 mg


   Documented by: 


Sodium Chloride ([place]yr Saline Nasal Gel)  0 gm RHONA TID PRN


   PRN Reason: Dryness


Spironolactone (Aldactone)  25 mg PO DAILY [facility]


   Last Admin: 09/21/20 08:32 Dose:  25 mg


   Documented by: 


Tamsulosin HCl (Flomax)  0.8 mg PO BEDTIME [facility]


   Last Admin: 09/20/20 20:36 Dose:  0.8 mg


   Documented by: 





Discontinued Medications





Amlodipine Besylate (Norvasc)  5 mg PO BEDTIME [facility]


   Last Admin: 09/20/20 20:35 Dose:  5 mg


   Documented by: 


Calcitriol (Rocaltrol)  0.25 mcg PO ONETIME ONE


   Stop: 09/11/20 13:01


   Last Admin: 09/11/20 13:09 Dose:  0.25 mcg


   Documented by: 


Dexamethasone (Dexamethasone)  6 mg IVPUSH Q24H [facility]


   Stop: 09/20/20 13:01


   Last Admin: 09/14/20 12:05 Dose:  6 mg


   Documented by: 


Dexamethasone (Dexamethasone)  6 mg PO Q24H [facility]


   Stop: 09/20/20 13:01


   Last Admin: 09/19/20 12:16 Dose:  6 mg


   Documented by: 


Sodium Chloride (Normal Saline)  500 mls @ 1,000 mls/hr IV .BOLUS ONE


   Stop: 09/11/20 04:18


   Last Admin: 09/11/20 04:05 Dose:  1,000 mls/hr


   Documented by: 


Sodium Chloride (Normal Saline)  500 mls @ 1,000 mls/hr IV .BOLUS ONE


   Stop: 09/11/20 05:56


   Last Admin: 09/11/20 05:34 Dose:  1,000 mls/hr


   Documented by: 


Remdesivir 100 mg/ Sodium (Chloride)  100 mls @ 100 mls/hr IV Q24H [facility]


   Stop: 09/15/20 13:59


   Last Admin: 09/15/20 12:38 Dose:  100 mls/hr


   Documented by: 


Remdesivir 200 mg/ Sodium (Chloride)  250 mls @ 250 mls/hr IV ONETIME ONE


   Stop: 09/11/20 13:59


   Last Admin: 09/11/20 13:05 Dose:  250 mls/hr


   Documented by: 


Insulin Glargine (Lantus)  3 unit SUBCUT DAILY [facility]


   Last Admin: 09/21/20 08:07 Dose:  Not Given


   Documented by: 











- Exam


Quality Assessment: DVT Prophylaxis (on Xarelto).  No: Supplemental Oxygen


General: Alert, Oriented, Cooperative, No Acute Distress


HEENT: Pupils Equal, Mucous Membr. Moist/Pink


Neck: Supple, Trachea Midline, No JVD.  No: Lymphadenopathy


Lungs: Normal Respiratory Effort, Decreased Breath Sounds


Cardiovascular: Regular Rate, Regular Rhythm, No Murmurs


GI/Abdominal Exam: Normal Bowel Sounds, Soft, Non-Tender


 (Male) Exam: Deferred


Back Exam: Normal Inspection


Extremities: Normal Inspection


Peripheral Pulses: 2+: Radial (L), Radial (R)


Skin: Warm, Dry, Intact


Neurological: No New Focal Deficit


Psy/Mental Status: Alert, Normal Affect, Normal Mood





Sepsis Event Note





- Evaluation


Sepsis Screening Result: No Definite Risk





- Focused Exam


Vital Signs: 


                                   Vital Signs











  Temp Pulse Resp BP Pulse Ox


 


 09/21/20 08:31   64   148/45 H 


 


 09/21/20 07:43  97.9 F  64  16  148/45 H  99


 


 09/21/20 06:20  98.2 F  67  20  128/31 L  100














- Problem List & Annotations


(1) Acute on chronic renal failure


SNOMED Code(s): 345057607


   Code(s): N17.9 - ACUTE KIDNEY FAILURE, UNSPECIFIED; N18.9 - CHRONIC KIDNEY 

DISEASE, UNSPECIFIED   Status: Acute   Priority: High   Current Visit: Yes   





(2) Atrial fibrillation with controlled ventricular rate


SNOMED Code(s): 99269931


   Code(s): I48.91 - UNSPECIFIED ATRIAL FIBRILLATION   Status: Chronic   

Priority: High   Current Visit: Yes   





(3) B12 deficiency


SNOMED Code(s): 205053813


   Code(s): E53.8 - DEFICIENCY OF OTHER SPECIFIED B GROUP VITAMINS   Status: 

Chronic   Priority: Medium   Current Visit: Yes   





(4) COVID-19


SNOMED Code(s): 022188976


   Code(s): U07.1 - COVID-19   Status: Acute   Priority: High   Current Visit: 

Yes   





(5) Depression


SNOMED Code(s): 13174459


   Code(s): F32.9 - MAJOR DEPRESSIVE DISORDER, SINGLE EPISODE, UNSPECIFIED   

Status: Chronic   Priority: Medium   Current Visit: Yes   


Qualifiers: 


   Depression Type: unspecified   Qualified Code(s): F32.9 - Major depressive 

disorder, single episode, unspecified   





(6) Dyslipidemia


SNOMED Code(s): 092664215


   Code(s): E78.5 - HYPERLIPIDEMIA, UNSPECIFIED   Status: Chronic   Priority: 

Medium   Current Visit: Yes   





(7) Hypoalbuminemia


SNOMED Code(s): 502299376


   Code(s): E88.09 - OTH DISORDERS OF PLASMA-PROTEIN METABOLISM, NEC   Status: 

Acute   Priority: High   Current Visit: Yes   





(8) Hyponatremia


SNOMED Code(s): 52485644


   Code(s): E87.1 - HYPO-OSMOLALITY AND HYPONATREMIA   Status: Resolved   

Priority: Low   Current Visit: Yes   





(9) Iron deficiency


SNOMED Code(s): 54262061


   Code(s): E61.1 - IRON DEFICIENCY   Status: Acute   Priority: High   Current 

Visit: Yes   





(10) Orthostasis


SNOMED Code(s): 11063122


   Code(s): I95.1 - ORTHOSTATIC HYPOTENSION   Status: Resolved   Priority: High 

 Current Visit: Yes   





(11) Diabetes


SNOMED Code(s): 91085900


   Code(s): E11.9 - TYPE 2 DIABETES MELLITUS WITHOUT COMPLICATIONS   Status: 

Chronic   Priority: High   Current Visit: Yes   


Qualifiers: 


   Diabetes mellitus type: type 2   Diabetes mellitus long term insulin use: 

unspecified long term insulin use status   Diabetes mellitus complication 

status: without complication   Qualified Code(s): E11.9 - Type 2 diabetes 

mellitus without complications   





(12) GERD (gastroesophageal reflux disease)


SNOMED Code(s): 662343783


   Code(s): K21.9 - GASTRO-ESOPHAGEAL REFLUX DISEASE WITHOUT ESOPHAGITIS   

Status: Chronic   Priority: Medium   Current Visit: No   


Qualifiers: 


   Esophagitis presence: without esophagitis   Qualified Code(s): K21.9 - 

Gastro-esophageal reflux disease without esophagitis   





(13) Hypertension


SNOMED Code(s): 92859971


   Code(s): I10 - ESSENTIAL (PRIMARY) HYPERTENSION   Status: Chronic   Priority:

High   Current Visit: No   


Qualifiers: 


   Hypertension type: unspecified   Qualified Code(s): I10 - Essential (primary)

hypertension   





(14) COPD (chronic obstructive pulmonary disease)


SNOMED Code(s): 95706874


   Code(s): J44.9 - CHRONIC OBSTRUCTIVE PULMONARY DISEASE, UNSPECIFIED   Status:

Chronic   Priority: High   Current Visit: No   


Qualifiers: 


   COPD type: unspecified COPD   Qualified Code(s): J44.9 - Chronic obstructive 

pulmonary disease, unspecified   





- Problem List Review


Problem List Initiated/Reviewed/Updated: Yes





- Assessment


Assessment:: 





9/11/20


1 day of weakness and low O2 sats


Lives in [place], + COVID patients in past week


Pulse ox on admission 89%


CXR with cardiomegaly only, no lung parenchyma changes


Orthostatic --> given 500ml IVF bolus--> still orthostatic--> 2nd bolus


Lab results:


- Hb 13.2, Hct 40.1


- Na 133, GFR 19, glucose 165


- Troponin negative


Heart rate on admission 98


EKG with a. fib., rate 84


RVR will likely increase O2 needs- COVID positive


GFR baseline 26, 19 on admission


Not on any nephrotoxic meds 


Decreased oral intake


Admission /81


Hypertension home management with metoprolol


Diabetes home management with glimepiride, sitagliptin and insulin


Glucose on admission 165


Smoked 1.5 ppd for 51 years; Quit in 2005 


PLAN


- Start Remdesivir


- Start Dexamethasone


- PRN albuterol


- Continue Symbicort


- Hold metoprolol due to orthostatic hypotension


- Monitor for hypoxemia


- Monitor urine output


- Renally dosed medications


- Repeat labs and replace electrolytes as needed  


- Hold metoprolol


- PRN Hydralazine


- Continue home insulin at same dose for now


- No premeals, adjust with new measurements as needed


- New HbA1c


- Accu-checks before meals and bedtime


- Hypoglycemia protocol


- Hold PO meds


- Continue home omeprazole


- Continue home statin


- Dietary consult


- Continue home sertraline


Patient will be admitted to med surg floor, will be started on Remdesivir and 

dexamethasone. 





9/12/20


No longer on oxygen supplementation since 1:30 PM on 9/11/2020


Vital signs trend


Blood pressure: 117 264/50 3-91


T-max 98.2


Heart rate: 66-98


Pulse ox greater than 89%


Lab results


WBC down from 6.96-4.05


Hemoglobin down from 13.2-11.7


D-dimer stable from 0.72 2.71


Sodium down from 1 33-1 32


GFR up from 19-26


B12 87


BNP up from 3831-8346


CRP 5.2


Iron 26


Glucose -258


A1c 6.9%


PLAN


-Continue Remdesivir day 2 out of 5


-Continue dexamethasone day 2 out of 10


-Continue Symbicort


-PRN albuterol


-Repeat labs as needed


-Continue to hold metoprolol


-Monitor for hypoxemia


-Monitor urine output


-Renally dosed medications


-Repeat labs and replace electrolytes as needed  


-Hold metoprolol


-PRN Hydralazine


-Continue home insulin at same dose for now


-Accu-checks before meals and bedtime


-Hypoglycemia protocol


-Hold PO meds


-Continue home omeprazole


- Continue home statin


Patient will remain admitted for antiviral treatment for COVID.


Length of stay at least 5 days due to length of treatment required for COVID.





9/13/20


Shortness of breath has resolved


Has not required oxygen supplementation the past 24 hours


Vital signs trend


Blood pressure 768326/7289


T-max 98.4


Heart rate 66 to 88/min


Pulse ox greater than 97% on room air


New lab results


WBC up from 4.05-7.54


Hemoglobin up from 11.7-11.9


Platelets up from 193-235


D-dimer down from 0.71 2.5


Sodium stable from 1 


Chloride up from 94-96


GFR up from 26-30


Magnesium up from 1.9-2.1


CRP down from 5.2-2.8


Glucose trend 171-220





9/14/2020


Patient is asymptomatic.


Vital signs are stable and pulse ox is in the mid to upper 90s on room air.


CRP 2.0 and normal d-dimer.


Sodium up to 136.


BUN to creatinine ratio is still elevated at 30.0.


Estimated GFR is 30 and stable.  This is his chronic stable state.


Episode of blood sugar of 300 today.


Started sliding scale insulin.





9/15/2020


Patient is feeling well.


Vital signs stable with oxygenation in the mid upper 90s and afebrile.


Systolic blood pressure in the 160s today


Labs held today.


Blood sugars improved.


Awaiting placement.





9/16/2020


Patient is stable without any significant changes.


Blood pressure is ranging in the 150s up to 170s.  Afebrile.  Oxygen saturation 

on room air ranging from 90 to 100%, weight stable at approximately 66 kg


Hemoglobin and white count are stable at 12.0 and 7.9 respectively.


GFR 36 with creatinine of 1.8 which is improved.


BUN stable at 62.  Elevation likely secondary to protein catabolism from 

dexamethasone.


Blood sugars improved ranging from 120 up to 262


Still awaiting placement


Patient has not been getting his ethacrynic acid because it is nonformulary.





9/17/2020


Blood pressure continues to be elevated.  He was started on low-dose Lasix this 

morning.  He would likely benefit from additional treatment.


Improvement in glucose. But continues to be elevated.  This should resolve once 

he is off of dexamethasone.


No significant change overnight.





9/18/2020


Blood pressure is improved with the addition of amlodipine.


Patient has no complaints.


Glucose continues to be elevated secondary to dexamethasone





9/19/2020


Blood pressure is improved but continues to be elevated.


Blood pressure ranges from 122//56


Fingerstick blood sugars also continue to be elevated.


Still off oxygen and generally doing either better or stable on all parameters.





9/20/2020


Blood pressure improved with the last few systolic blood pressures between 149 

and 155


Blood sugars continue to be elevated likely secondary to dexamethasone


Estimated GFR 34.


BUN and creatinine both stable at 61 and 1.9, respectively





09/21/2020


Blood pressures 148/45.


Dexamethasone discontinued yesterday. 


Blood sugars this morning 82.  Insulin held.  











- Plan


Plan:: 








COVID-19


COPD (chronic obstructive pulmonary disease)


-Continue Symbicort


-PRN albuterol


-Repeat labs as needed





Atrial fibrillation with controlled ventricular rate


-Continue metoprolol


-Monitor for hypoxemia


 


Acute on chronic renal failure 2/2 Volume depletion, improved


Hyponatremia, resolved


Iron deficiency anemia


B12 deficiency


-Monitor urine output


-Renally dosed medications


-Repeat labs and replace electrolytes as needed  





Hypertension


-Metoprolol


-Continue Lasix 20 mg daily


-Amlodipine 5 mg at night


-PRN Hydralazine





Diabetes mellitus, [id number]%


-Continue home insulin 


-Accu-checks before meals and bedtime


-Hypoglycemia protocol


-Hold PO meds


- sliding scale insulin


-Increase glargine and extra 3 units in the morning.


-Continue glargine 10 units in the evening


-insulin held this morning due to hypoglycemia





Gastroesophageal reflux disease


-Continue home omeprazole





Dyslipidemia


- Continue home statin





Hypoalbuminemia


-Dietary consult





Depression


-Continue home sertraline





Orthostatic, resolved





PROPHYLAXIS


DVT- home Xarelto


GI- home omeprazole





CODE STATUS: FULL CODE





DISPOSITION:


Awaiting placement.

## 2020-09-22 NOTE — PCM.PN
- General Info


Date of Service: 09/22/20


Admission Dx/Problem (Free Text): 


                           Admission Diagnosis/Problem





Admission Diagnosis/Problem      Hypoxia








Subjective Update: 





Doing well.  Nursing staff called Dr. Kirby last night in regards to tinea 

noted to left groin and bilateral buttocks.  Lotrimin started.  Dermatitis noted

to bilateral cheeks. Bactroban also started. 


Nursing staff reports that the patient coughs whenever he takes anything po.  

Chest xray ordered.  Nothing acute appreciated.  CBC shows leukocytosis, but 

this is probably due to IV decadron pt had been receiving. Dr. Kirby is in 

agreement with this.  


Functional Status: Reports: Pain Controlled, Tolerating Diet, Urinating





- Review of Systems


General: Reports: No Symptoms


HEENT: Reports: No Symptoms


Pulmonary: Reports: No Symptoms


Cardiovascular: Reports: No Symptoms


Gastrointestinal: Reports: No Symptoms


Genitourinary: Reports: Incontinence


Musculoskeletal: Reports: No Symptoms


Skin: Reports: Other (tinea noted to right groin and bilateral buttocks; derma

tits noted to bilateral cheeks)


Neurological: Reports: No Symptoms


Psychiatric: Reports: No Symptoms





- Patient Data


Vitals - Most Recent: 


                                Last Vital Signs











Temp  97.5 F   09/22/20 03:44


 


Pulse  72   09/22/20 09:16


 


Resp  18   09/22/20 03:44


 


BP  137/61   09/22/20 09:15


 


Pulse Ox  91 L  09/22/20 09:16








                                        





Orthostatic Blood Pressure [     71/53


Standing]                        


Orthostatic Blood Pressure [     100/71


Sitting]                         


Orthostatic Blood Pressure [     121/52


Supine]                          








Weight - Most Recent: 141 lb


I&O - Last 24 Hours: 


                                 Intake & Output











 09/21/20 09/22/20 09/22/20





 22:59 06:59 14:59


 


Intake Total 840 450 


 


Balance 840 450 











Lab Results Last 24 Hours: 


                         Laboratory Results - last 24 hr











  09/21/20 09/21/20 09/21/20 Range/Units





  11:20 17:18 21:39 


 


POC Glucose  147 H  245 H  178 H  ()  mg/dL














  09/22/20 Range/Units





  06:26 


 


POC Glucose  107  ()  mg/dL











Med Orders - Current: 


                               Current Medications





Acetaminophen (Tylenol)  325 mg PO Q4H PRN


   PRN Reason: Pain (Mild 1-3)/fever


Albuterol/Ipratropium (Duoneb 3.0-0.5 Mg/3 Ml)  3 ml INH Q4H PRN


   PRN Reason: Shortness of Breath


Amlodipine Besylate (Norvasc)  5 mg PO BEDTIME [facility]


   Last Admin: 09/21/20 21:49 Dose:  5 mg


   Documented by: 


Aspirin (Halfprin)  81 mg PO DAILY [facility]


   Last Admin: 09/22/20 09:15 Dose:  81 mg


   Documented by: 


Calcitriol (Rocaltrol)  0.25 mcg PO MOFR [facility]


   Last Admin: 09/21/20 11:17 Dose:  0.25 mcg


   Documented by: 


Cholecalciferol (Vitamin D3)  25 mcg PO DAILY [facility]


   Last Admin: 09/22/20 09:15 Dose:  25 mcg


   Documented by: 


Clotrimazole (Lotrimin Af 1% Crm)  0 gm TOP BID [facility]


   Last Admin: 09/22/20 09:18 Dose:  1 applic


   Documented by: 


Dextrose/Water (Dextrose 50% In Water)  50 ml IVPUSH ASDIRECTED PRN


   PRN Reason: Hypoglycemia


Ferrous Sulfate (Ferrous Sulfate)  324 mg PO Q48H [facility]


   Last Admin: 09/22/20 09:15 Dose:  324 mg


   Documented by: 


Fluticasone Propionate (Flonase)  0 gm NASBOTH DAILY [facility]


   Last Admin: 09/22/20 09:18 Dose:  1 spray


   Documented by: 


Furosemide (Lasix)  20 mg PO DAILY [facility]


   Last Admin: 09/22/20 09:15 Dose:  20 mg


   Documented by: 


Insulin Glargine (Lantus)  10 unit SUBCUT BEDTIME [facility]


   Last Admin: 09/21/20 21:41 Dose:  10 units


   Documented by: 


Insulin Human Lispro (Humalog)  0 unit SUBCUT QIDACANDBED [facility]; Protocol


   Last Admin: 09/22/20 07:03 Dose:  Not Given


   Documented by: 


Metoprolol Succinate (Toprol Xl)  100 mg PO DAILY [facility]


   Last Admin: 09/22/20 09:15 Dose:  100 mg


   Documented by: 


Mometasone Furoate/Formoterol Fumar (Dulera 200-5 Mcg)  2 puff IH BID [facility]


   Last Admin: 09/22/20 08:07 Dose:  2 puff


   Documented by: 


Mupirocin (Bactroban Oint)  0 gm TOP TID [facility]


   Last Admin: 09/22/20 09:19 Dose:  1 applic


   Documented by: 


Ethacrynic Acid 50 (Mg **Ptom)  50 mg PO BID [facility]


   Last Admin: 09/22/20 09:19 Dose:  Not Given


   Documented by: 


Ondansetron HCl (Zofran)  4 mg IV Q6H PRN


   PRN Reason: Nausea/Vomiting


   Last Admin: 09/12/20 11:18 Dose:  4 mg


   Documented by: 


Pantoprazole Sodium (Protonix***)  40 mg PO DAILY [facility]


   Last Admin: 09/22/20 09:17 Dose:  40 mg


   Documented by: 


Rivaroxaban (Xarelto)  10 mg PO BEDTIME [facility]


   Last Admin: 09/21/20 21:49 Dose:  10 mg


   Documented by: 


Rosuvastatin Calcium (Crestor)  10 mg PO BEDTIME [facility]


   Last Admin: 09/21/20 21:49 Dose:  10 mg


   Documented by: 


Senna/Docusate Sodium (Senna Plus)  1 tab PO DAILY [facility]


   Last Admin: 09/22/20 09:15 Dose:  1 tab


   Documented by: 


Sertraline HCl (Zoloft)  50 mg PO BEDTIME [facility]


   Last Admin: 09/21/20 21:49 Dose:  50 mg


   Documented by: 


Sodium Chloride (Ayr Saline Nasal Gel)  0 gm RHONA TID PRN


   PRN Reason: Dryness


Spironolactone (Aldactone)  25 mg PO DAILY [facility]


   Last Admin: 09/22/20 09:15 Dose:  25 mg


   Documented by: 


Tamsulosin HCl (Flomax)  0.8 mg PO BEDTIME [facility]


   Last Admin: 09/21/20 21:44 Dose:  0.8 mg


   Documented by: 





Discontinued Medications





Amlodipine Besylate (Norvasc)  5 mg PO BEDTIME [facility]


   Last Admin: 09/20/20 20:35 Dose:  5 mg


   Documented by: 


Calcitriol (Rocaltrol)  0.25 mcg PO ONETIME ONE


   Stop: 09/11/20 13:01


   Last Admin: 09/11/20 13:09 Dose:  0.25 mcg


   Documented by: 


Dexamethasone (Dexamethasone)  6 mg IVPUSH Q24H [facility]


   Stop: 09/20/20 13:01


   Last Admin: 09/14/20 12:05 Dose:  6 mg


   Documented by: 


Dexamethasone (Dexamethasone)  6 mg PO Q24H [facility]


   Stop: 09/20/20 13:01


   Last Admin: 09/19/20 12:16 Dose:  6 mg


   Documented by: 


Sodium Chloride (Normal Saline)  500 mls @ 1,000 mls/hr IV .BOLUS ONE


   Stop: 09/11/20 04:18


   Last Admin: 09/11/20 04:05 Dose:  1,000 mls/hr


   Documented by: 


Sodium Chloride (Normal Saline)  500 mls @ 1,000 mls/hr IV .BOLUS ONE


   Stop: 09/11/20 05:56


   Last Admin: 09/11/20 05:34 Dose:  1,000 mls/hr


   Documented by: 


Remdesivir 100 mg/ Sodium (Chloride)  100 mls @ 100 mls/hr IV Q24H [facility]


   Stop: 09/15/20 13:59


   Last Admin: 09/15/20 12:38 Dose:  100 mls/hr


   Documented by: 


Remdesivir 200 mg/ Sodium (Chloride)  250 mls @ 250 mls/hr IV ONETIME ONE


   Stop: 09/11/20 13:59


   Last Admin: 09/11/20 13:05 Dose:  250 mls/hr


   Documented by: 


Insulin Glargine (Lantus)  3 unit SUBCUT DAILY [facility]


   Last Admin: 09/21/20 08:07 Dose:  Not Given


   Documented by: 











- Exam


Quality Assessment: DVT Prophylaxis (on xarelto).  No: Supplemental Oxygen


General: Alert, Cooperative, No Acute Distress


HEENT: Pupils Equal, Pupils Reactive, Mucous Membr. Moist/Pink


Neck: Supple, Trachea Midline, No JVD.  No: Lymphadenopathy


Lungs: Clear to Auscultation, Normal Respiratory Effort


Cardiovascular: Regular Rate, Regular Rhythm, No Murmurs


GI/Abdominal Exam: Normal Bowel Sounds, Soft, Non-Tender, No Distention


 (Male) Exam: Deferred


Back Exam: Normal Inspection, Full Range of Motion


Extremities: Normal Inspection, Normal Range of Motion


Peripheral Pulses: 2+: Radial (L), Radial (R), Dorsalis Pedis (L), Dorsalis 

Pedis (R)


Skin: Other (tinea to left groin and bilateral buttocks; dermatitis to bilateral

cheeks)


Neurological: No New Focal Deficit


Psy/Mental Status: Alert, Normal Affect, Normal Mood





Sepsis Event Note





- Evaluation


Sepsis Screening Result: No Definite Risk





- Focused Exam


Vital Signs: 


                                   Vital Signs











  Temp Pulse Resp BP Pulse Ox Pulse Ox


 


 09/22/20 09:16   72    91 L 


 


 09/22/20 09:15   71   137/61  


 


 09/22/20 08:08       99


 


 09/22/20 03:44  97.5 F  69  18  139/67  97 


 


 09/22/20 00:58  97.9 F  61  18  120/51 L  98 














- Problem List & Annotations


(1) Acute on chronic renal failure


SNOMED Code(s): 544341044


   Code(s): N17.9 - ACUTE KIDNEY FAILURE, UNSPECIFIED; N18.9 - CHRONIC KIDNEY 

DISEASE, UNSPECIFIED   Status: Acute   Priority: High   Current Visit: Yes   


Qualifiers: 


   Acute renal failure type: unspecified   Chronic kidney disease stage: 

unspecified stage   Qualified Code(s): N17.9 - Acute kidney failure, 

unspecified; N18.9 - Chronic kidney disease, unspecified   





(2) Atrial fibrillation with controlled ventricular rate


SNOMED Code(s): 77528136


   Code(s): I48.91 - UNSPECIFIED ATRIAL FIBRILLATION   Status: Chronic   

Priority: High   Current Visit: Yes   





(3) B12 deficiency


SNOMED Code(s): 049405625


   Code(s): E53.8 - DEFICIENCY OF OTHER SPECIFIED B GROUP VITAMINS   Status: 

Chronic   Priority: Medium   Current Visit: Yes   





(4) COVID-19


SNOMED Code(s): 144152029


   Code(s): U07.1 - COVID-19   Status: Acute   Priority: High   Current Visit: 

Yes   





(5) Depression


SNOMED Code(s): 72435726


   Code(s): F32.9 - MAJOR DEPRESSIVE DISORDER, SINGLE EPISODE, UNSPECIFIED   

Status: Chronic   Priority: Medium   Current Visit: Yes   


Qualifiers: 


   Depression Type: unspecified   Qualified Code(s): F32.9 - Major depressive 

disorder, single episode, unspecified   





(6) Dyslipidemia


SNOMED Code(s): 795022486


   Code(s): E78.5 - HYPERLIPIDEMIA, UNSPECIFIED   Status: Chronic   Priority: 

Medium   Current Visit: Yes   





(7) Hypoalbuminemia


SNOMED Code(s): 637692661


   Code(s): E88.09 - OTH DISORDERS OF PLASMA-PROTEIN METABOLISM, NEC   Status: 

Acute   Priority: High   Current Visit: Yes   





(8) Hyponatremia


SNOMED Code(s): 80334538


   Code(s): E87.1 - HYPO-OSMOLALITY AND HYPONATREMIA   Status: Resolved   

Priority: Low   Current Visit: Yes   





(9) Iron deficiency


SNOMED Code(s): 11589107


   Code(s): E61.1 - IRON DEFICIENCY   Status: Acute   Priority: High   Current 

Visit: Yes   





(10) Orthostasis


SNOMED Code(s): 12996718


   Code(s): I95.1 - ORTHOSTATIC HYPOTENSION   Status: Resolved   Priority: High 

 Current Visit: Yes   





(11) Diabetes


SNOMED Code(s): 51233507


   Code(s): E11.9 - TYPE 2 DIABETES MELLITUS WITHOUT COMPLICATIONS   Status: C

hronic   Priority: High   Current Visit: Yes   


Qualifiers: 


   Diabetes mellitus type: type 2   Diabetes mellitus long term insulin use: 

unspecified long term insulin use status   Diabetes mellitus complication 

status: without complication   Qualified Code(s): E11.9 - Type 2 diabetes 

mellitus without complications   





(12) GERD (gastroesophageal reflux disease)


SNOMED Code(s): 556743008


   Code(s): K21.9 - GASTRO-ESOPHAGEAL REFLUX DISEASE WITHOUT ESOPHAGITIS   

Status: Chronic   Priority: Medium   Current Visit: No   


Qualifiers: 


   Esophagitis presence: without esophagitis   Qualified Code(s): K21.9 - 

Gastro-esophageal reflux disease without esophagitis   





(13) Hypertension


SNOMED Code(s): 54762820


   Code(s): I10 - ESSENTIAL (PRIMARY) HYPERTENSION   Status: Chronic   Priority:

High   Current Visit: No   


Qualifiers: 


   Hypertension type: unspecified   Qualified Code(s): I10 - Essential (primary)

hypertension   





(14) COPD (chronic obstructive pulmonary disease)


SNOMED Code(s): 93306536


   Code(s): J44.9 - CHRONIC OBSTRUCTIVE PULMONARY DISEASE, UNSPECIFIED   Status:

Chronic   Priority: High   Current Visit: No   


Qualifiers: 


   COPD type: unspecified COPD   Qualified Code(s): J44.9 - Chronic obstructive 

pulmonary disease, unspecified   





- Problem List Review


Problem List Initiated/Reviewed/Updated: Yes





- Assessment


Assessment:: 





9/11/20


1 day of weakness and low O2 sats


Lives in [place], + COVID patients in past week


Pulse ox on admission 89%


CXR with cardiomegaly only, no lung parenchyma changes


Orthostatic --> given 500ml IVF bolus--> still orthostatic--> 2nd bolus


Lab results:


- Hb 13.2, Hct 40.1


- Na 133, GFR 19, glucose 165


- Troponin negative


Heart rate on admission 98


EKG with a. fib., rate 84


RVR will likely increase O2 needs- COVID positive


GFR baseline 26, 19 on admission


Not on any nephrotoxic meds 


Decreased oral intake


Admission /81


Hypertension home management with metoprolol


Diabetes home management with glimepiride, sitagliptin and insulin


Glucose on admission 165


Smoked 1.5 ppd for 51 years; Quit in 2005 


PLAN


- Start Remdesivir


- Start Dexamethasone


- PRN albuterol


- Continue Symbicort


- Hold metoprolol due to orthostatic hypotension


- Monitor for hypoxemia


- Monitor urine output


- Renally dosed medications


- Repeat labs and replace electrolytes as needed  


- Hold metoprolol


- PRN Hydralazine


- Continue home insulin at same dose for now


- No premeals, adjust with new measurements as needed


- New HbA1c


- Accu-checks before meals and bedtime


- Hypoglycemia protocol


- Hold PO meds


- Continue home omeprazole


- Continue home statin


- Dietary consult


- Continue home sertraline


Patient will be admitted to med surg floor, will be started on Remdesivir and 

dexamethasone. 





9/12/20


No longer on oxygen supplementation since 1:30 PM on 9/11/2020


Vital signs trend


Blood pressure: 117 264/50 3-91


T-max 98.2


Heart rate: 66-98


Pulse ox greater than 89%


Lab results


WBC down from 6.96-4.05


Hemoglobin down from 13.2-11.7


D-dimer stable from 0.72 2.71


Sodium down from 1 33-1 32


GFR up from 19-26


B12 87


BNP up from 2629-3521


CRP 5.2


Iron 26


Glucose -258


A1c 6.9%


PLAN


-Continue Remdesivir day 2 out of 5


-Continue dexamethasone day 2 out of 10


-Continue Symbicort


-PRN albuterol


-Repeat labs as needed


-Continue to hold metoprolol


-Monitor for hypoxemia


-Monitor urine output


-Renally dosed medications


-Repeat labs and replace electrolytes as needed  


-Hold metoprolol


-PRN Hydralazine


-Continue home insulin at same dose for now


-Accu-checks before meals and bedtime


-Hypoglycemia protocol


-Hold PO meds


-Continue home omeprazole


- Continue home statin


Patient will remain admitted for antiviral treatment for COVID.


Length of stay at least 5 days due to length of treatment required for COVID.





9/13/20


Shortness of breath has resolved


Has not required oxygen supplementation the past 24 hours


Vital signs trend


Blood pressure 407055/7289


T-max 98.4


Heart rate 66 to 88/min


Pulse ox greater than 97% on room air


New lab results


WBC up from 4.05-7.54


Hemoglobin up from 11.7-11.9


Platelets up from 193-235


D-dimer down from 0.71 2.5


Sodium stable from 1 


Chloride up from 94-96


GFR up from 26-30


Magnesium up from 1.9-2.1


CRP down from 5.2-2.8


Glucose trend 171-220





9/14/2020


Patient is asymptomatic.


Vital signs are stable and pulse ox is in the mid to upper 90s on room air.


CRP 2.0 and normal d-dimer.


Sodium up to 136.


BUN to creatinine ratio is still elevated at 30.0.


Estimated GFR is 30 and stable.  This is his chronic stable state.


Episode of blood sugar of 300 today.


Started sliding scale insulin.





9/15/2020


Patient is feeling well.


Vital signs stable with oxygenation in the mid upper 90s and afebrile.


Systolic blood pressure in the 160s today


Labs held today.


Blood sugars improved.


Awaiting placement.





9/16/2020


Patient is stable without any significant changes.


Blood pressure is ranging in the 150s up to 170s.  Afebrile.  Oxygen saturation 

on room air ranging from 90 to 100%, weight stable at approximately 66 kg


Hemoglobin and white count are stable at 12.0 and 7.9 respectively.


GFR 36 with creatinine of 1.8 which is improved.


BUN stable at 62.  Elevation likely secondary to protein catabolism from 

dexamethasone.


Blood sugars improved ranging from 120 up to 262


Still awaiting placement


Patient has not been getting his ethacrynic acid because it is nonformulary.





9/17/2020


Blood pressure continues to be elevated.  He was started on low-dose Lasix this 

morning.  He would likely benefit from additional treatment.


Improvement in glucose. But continues to be elevated.  This should resolve once 

he is off of dexamethasone.


No significant change overnight.





9/18/2020


Blood pressure is improved with the addition of amlodipine.


Patient has no complaints.


Glucose continues to be elevated secondary to dexamethasone





9/19/2020


Blood pressure is improved but continues to be elevated.


Blood pressure ranges from 122//56


Fingerstick blood sugars also continue to be elevated.


Still off oxygen and generally doing either better or stable on all parameters.





9/20/2020


Blood pressure improved with the last few systolic blood pressures between 149 

and 155


Blood sugars continue to be elevated likely secondary to dexamethasone


Estimated GFR 34.


BUN and creatinine both stable at 61 and 1.9, respectively





09/21/2020


Blood pressures 148/45.


Dexamethasone discontinued yesterday. 


Blood sugars this morning 82.  Insulin held.  





09/22/2020


Blood pressures 139/67


Blood sugars 107 and 145 today.


Tinea to groin and buttocks.  Lotrimin topically started. 


WBC 16.7 likely due to IV decadron.  No bandemia or signs of symptoms of an 

infective process.


BUN 52


CRE 2.0


GFR 32


Nothing acute appreciated on chest xray.




















- Plan


Plan:: 








COVID-19


COPD (chronic obstructive pulmonary disease)


-Continue Symbicort


-PRN albuterol


-Repeat labs as needed





Atrial fibrillation with controlled ventricular rate


-Continue metoprolol


-Monitor for hypoxemia


 


Acute on chronic renal failure 2/2 Volume depletion, improved


Hyponatremia, resolved


Iron deficiency anemia


B12 deficiency


-Monitor urine output


-Renally dosed medications





Hypertension


-Metoprolol


-Continue Lasix 20 mg daily


-Amlodipine 5 mg at night


-PRN Hydralazine





Diabetes mellitus, PmX0i-1.9%


-Continue home insulin 


-Accu-checks before meals and bedtime


-Hypoglycemia protocol


-Hold PO meds


- sliding scale insulin





Gastroesophageal reflux disease


-Continue home omeprazole





Dyslipidemia


- Continue home statin





Hypoalbuminemia


-Dietary consult





Depression


-Continue home sertraline





Orthostatic, resolved





PROPHYLAXIS


DVT- home Xarelto


GI- home omeprazole





CODE STATUS: FULL CODE





DISPOSITION:


Awaiting placement.  Placement 09/28/20 at [facility].

## 2020-09-22 NOTE — CR
Chest: Portable view of the chest was obtained.

 

Comparison: Prior chest x-ray of 09/11/20.

 

Heart size is slightly prominent.  Tortuous thoracic aorta is seen.  

Lungs are clear with no acute parenchymal change.  Bony structures are

 grossly intact.

 

Impression:

1.  Nothing acute is seen on portable chest x-ray.

 

Diagnostic code #1

 

This report was dictated in MDT

## 2020-09-23 NOTE — PCM.PN
- General Info


Date of Service: 09/23/20


Admission Dx/Problem (Free Text): 


                           Admission Diagnosis/Problem





Admission Diagnosis/Problem      Hypoxia








Subjective Update: 





Nursing reports that patient is refusing to get out of bed.  Coughing at meals 

after swallowing.  


Functional Status: Reports: Tolerating Diet





- Review of Systems


General: Reports: No Symptoms


HEENT: Reports: No Symptoms


Pulmonary: Reports: Cough (chronic).  Denies: Shortness of Breath, Pleuritic 

Chest Pain, Wheezing


Cardiovascular: Reports: No Symptoms.  Denies: Chest Pain, Palpitations, Edema


Gastrointestinal: Reports: No Symptoms


Genitourinary: Reports: Incontinence


Musculoskeletal: Reports: No Symptoms


Skin: Reports: Rash (left groin, buttocks, bilateral cheeks)


Neurological: Reports: No Symptoms


Psychiatric: Reports: Confusion





- Patient Data


Weight - Most Recent: 139 lb 8 oz





- Exam


Quality Assessment: DVT Prophylaxis (xarelto)


General: Alert, Cooperative, No Acute Distress


HEENT: Pupils Equal, Mucous Membr. Moist/Pink


Neck: Supple, Trachea Midline.  No: Lymphadenopathy


Lungs: Normal Respiratory Effort, Decreased Breath Sounds


Cardiovascular: Regular Rate, Regular Rhythm, No Murmurs


GI/Abdominal Exam: Normal Bowel Sounds, Soft, Non-Tender, No Distention


 (Male) Exam: Deferred


Back Exam: Normal Inspection, Full Range of Motion


Extremities: Normal Inspection, Normal Range of Motion, Non-Tender, No Pedal 

Edema, Normal Capillary Refill


Peripheral Pulses: 2+: Radial (L), Radial (R), Posterior Tibial (L), Posterior 

Tibial (R)


Skin: Warm, Dry, Intact


Neurological: No New Focal Deficit


Psy/Mental Status: Alert, Normal Affect, Normal Mood





Sepsis Event Note





- Evaluation


Sepsis Screening Result: No Definite Risk





- Problem List & Annotations


(1) Acute on chronic renal failure


SNOMED Code(s): 764008857


   Code(s): N17.9 - ACUTE KIDNEY FAILURE, UNSPECIFIED; N18.9 - CHRONIC KIDNEY 

DISEASE, UNSPECIFIED   Status: Acute   Priority: High   Current Visit: Yes   


Qualifiers: 


   Acute renal failure type: unspecified   Chronic kidney disease stage: 

unspecified stage   Qualified Code(s): N17.9 - Acute kidney failure, 

unspecified; N18.9 - Chronic kidney disease, unspecified   





(2) Atrial fibrillation with controlled ventricular rate


SNOMED Code(s): 82019948


   Code(s): I48.91 - UNSPECIFIED ATRIAL FIBRILLATION   Status: Chronic   

Priority: High   Current Visit: Yes   





(3) B12 deficiency


SNOMED Code(s): 670496845


   Code(s): E53.8 - DEFICIENCY OF OTHER SPECIFIED B GROUP VITAMINS   Status: 

Chronic   Priority: Medium   Current Visit: Yes   





(4) COVID-19


SNOMED Code(s): 665671530


   Code(s): U07.1 - COVID-19   Status: Acute   Priority: High   Current Visit: 

Yes   





(5) Depression


SNOMED Code(s): 38999465


   Code(s): F32.9 - MAJOR DEPRESSIVE DISORDER, SINGLE EPISODE, UNSPECIFIED   

Status: Chronic   Priority: Medium   Current Visit: Yes   


Qualifiers: 


   Depression Type: unspecified   Qualified Code(s): F32.9 - Major depressive 

disorder, single episode, unspecified   





(6) Dyslipidemia


SNOMED Code(s): 078138567


   Code(s): E78.5 - HYPERLIPIDEMIA, UNSPECIFIED   Status: Chronic   Priority: 

Medium   Current Visit: Yes   





(7) Hypoalbuminemia


SNOMED Code(s): 135200035


   Code(s): E88.09 - OTH DISORDERS OF PLASMA-PROTEIN METABOLISM, NEC   Status: 

Acute   Priority: High   Current Visit: Yes   





(8) Hyponatremia


SNOMED Code(s): 32947528


   Code(s): E87.1 - HYPO-OSMOLALITY AND HYPONATREMIA   Status: Resolved   

Priority: Low   Current Visit: Yes   





(9) Iron deficiency


SNOMED Code(s): 18759872


   Code(s): E61.1 - IRON DEFICIENCY   Status: Acute   Priority: High   Current 

Visit: Yes   





(10) Orthostasis


SNOMED Code(s): 49944138


   Code(s): I95.1 - ORTHOSTATIC HYPOTENSION   Status: Resolved   Priority: High 

 Current Visit: Yes   





(11) Diabetes


SNOMED Code(s): 38008305


   Code(s): E11.9 - TYPE 2 DIABETES MELLITUS WITHOUT COMPLICATIONS   Status: 

Chronic   Priority: High   Current Visit: Yes   


Qualifiers: 


   Diabetes mellitus type: type 2   Diabetes mellitus long term insulin use: 

unspecified long term insulin use status   Diabetes mellitus complication 

status: without complication   Qualified Code(s): E11.9 - Type 2 diabetes 

mellitus without complications   





(12) GERD (gastroesophageal reflux disease)


SNOMED Code(s): 915614005


   Code(s): K21.9 - GASTRO-ESOPHAGEAL REFLUX DISEASE WITHOUT ESOPHAGITIS   

Status: Chronic   Priority: Medium   Current Visit: No   


Qualifiers: 


   Esophagitis presence: without esophagitis   Qualified Code(s): K21.9 - 

Gastro-esophageal reflux disease without esophagitis   





(13) Hypertension


SNOMED Code(s): 76034434


   Code(s): I10 - ESSENTIAL (PRIMARY) HYPERTENSION   Status: Chronic   Priority:

High   Current Visit: No   


Qualifiers: 


   Hypertension type: unspecified   Qualified Code(s): I10 - Essential (primary)

hypertension   





(14) COPD (chronic obstructive pulmonary disease)


SNOMED Code(s): 50152246


   Code(s): J44.9 - CHRONIC OBSTRUCTIVE PULMONARY DISEASE, UNSPECIFIED   Status:

Chronic   Priority: High   Current Visit: No   


Qualifiers: 


   COPD type: unspecified COPD   Qualified Code(s): J44.9 - Chronic obstructive 

pulmonary disease, unspecified   





- Problem List Review


Problem List Initiated/Reviewed/Updated: Yes





- Assessment


Assessment:: 





9/11/20


1 day of weakness and low O2 sats


Lives in [place], + COVID patients in past week


Pulse ox on admission 89%


CXR with cardiomegaly only, no lung parenchyma changes


Orthostatic --> given 500ml IVF bolus--> still orthostatic--> 2nd bolus


Lab results:


- Hb 13.2, Hct 40.1


- Na 133, GFR 19, glucose 165


- Troponin negative


Heart rate on admission 98


EKG with a. fib., rate 84


RVR will likely increase O2 needs- COVID positive


GFR baseline 26, 19 on admission


Not on any nephrotoxic meds 


Decreased oral intake


Admission /81


Hypertension home management with metoprolol


Diabetes home management with glimepiride, sitagliptin and insulin


Glucose on admission 165


Smoked 1.5 ppd for 51 years; Quit in 2005 


PLAN


- Start Remdesivir


- Start Dexamethasone


- PRN albuterol


- Continue Symbicort


- Hold metoprolol due to orthostatic hypotension


- Monitor for hypoxemia


- Monitor urine output


- Renally dosed medications


- Repeat labs and replace electrolytes as needed  


- Hold metoprolol


- PRN Hydralazine


- Continue home insulin at same dose for now


- No premeals, adjust with new measurements as needed


- New HbA1c


- Accu-checks before meals and bedtime


- Hypoglycemia protocol


- Hold PO meds


- Continue home omeprazole


- Continue home statin


- Dietary consult


- Continue home sertraline


Patient will be admitted to med surg floor, will be started on Remdesivir and 

dexamethasone. 





9/12/20


No longer on oxygen supplementation since 1:30 PM on 9/11/2020


Vital signs trend


Blood pressure: 117 264/50 3-91


T-max 98.2


Heart rate: 66-98


Pulse ox greater than 89%


Lab results


WBC down from 6.96-4.05


Hemoglobin down from 13.2-11.7


D-dimer stable from 0.72 2.71


Sodium down from 1 33-1 32


GFR up from 19-26


B12 87


BNP up from 7357-3193


CRP 5.2


Iron 26


Glucose -258


A1c 6.9%


PLAN


-Continue Remdesivir day 2 out of 5


-Continue dexamethasone day 2 out of 10


-Continue Symbicort


-PRN albuterol


-Repeat labs as needed


-Continue to hold metoprolol


-Monitor for hypoxemia


-Monitor urine output


-Renally dosed medications


-Repeat labs and replace electrolytes as needed  


-Hold metoprolol


-PRN Hydralazine


-Continue home insulin at same dose for now


-Accu-checks before meals and bedtime


-Hypoglycemia protocol


-Hold PO meds


-Continue home omeprazole


- Continue home statin


Patient will remain admitted for antiviral treatment for COVID.


Length of stay at least 5 days due to length of treatment required for COVID.





9/13/20


Shortness of breath has resolved


Has not required oxygen supplementation the past 24 hours


Vital signs trend


Blood pressure 913144/7289


T-max 98.4


Heart rate 66 to 88/min


Pulse ox greater than 97% on room air


New lab results


WBC up from 4.05-7.54


Hemoglobin up from 11.7-11.9


Platelets up from 193-235


D-dimer down from 0.71 2.5


Sodium stable from 1 


Chloride up from 94-96


GFR up from 26-30


Magnesium up from 1.9-2.1


CRP down from 5.2-2.8


Glucose trend 171-220





9/14/2020


Patient is asymptomatic.


Vital signs are stable and pulse ox is in the mid to upper 90s on room air.


CRP 2.0 and normal d-dimer.


Sodium up to 136.


BUN to creatinine ratio is still elevated at 30.0.


Estimated GFR is 30 and stable.  This is his chronic stable state.


Episode of blood sugar of 300 today.


Started sliding scale insulin.





9/15/2020


Patient is feeling well.


Vital signs stable with oxygenation in the mid upper 90s and afebrile.


Systolic blood pressure in the 160s today


Labs held today.


Blood sugars improved.


Awaiting placement.





9/16/2020


Patient is stable without any significant changes.


Blood pressure is ranging in the 150s up to 170s.  Afebrile.  Oxygen saturation 

on room air ranging from 90 to 100%, weight stable at approximately 66 kg


Hemoglobin and white count are stable at 12.0 and 7.9 respectively.


GFR 36 with creatinine of 1.8 which is improved.


BUN stable at 62.  Elevation likely secondary to protein catabolism from 

dexamethasone.


Blood sugars improved ranging from 120 up to 262


Still awaiting placement


Patient has not been getting his ethacrynic acid because it is nonformulary.





9/17/2020


Blood pressure continues to be elevated.  He was started on low-dose Lasix this 

morning.  He would likely benefit from additional treatment.


Improvement in glucose. But continues to be elevated.  This should resolve once 

he is off of dexamethasone.


No significant change overnight.





9/18/2020


Blood pressure is improved with the addition of amlodipine.


Patient has no complaints.


Glucose continues to be elevated secondary to dexamethasone





9/19/2020


Blood pressure is improved but continues to be elevated.


Blood pressure ranges from 122//56


Fingerstick blood sugars also continue to be elevated.


Still off oxygen and generally doing either better or stable on all parameters.





9/20/2020


Blood pressure improved with the last few systolic blood pressures between 149 

and 155


Blood sugars continue to be elevated likely secondary to dexamethasone


Estimated GFR 34.


BUN and creatinine both stable at 61 and 1.9, respectively





09/21/2020


Blood pressures 148/45.


Dexamethasone discontinued yesterday. 


Blood sugars this morning 82.  Insulin held.  





09/22/2020


Blood pressures 139/67


Blood sugars 107 and 145 today.


Tinea to groin and buttocks.  Lotrimin topically started. 


WBC 16.7 likely due to IV decadron.  No bandemia or signs of symptoms of an 

infective process.


BUN 52


CRE 2.0


GFR 32


Nothing acute appreciated on chest xray.





09/23/20


Pt stable without any changes.  


Coughing when taking po.


Awaiting placement to [place] on Monday


Vital signs trend


-Blood pressure:  131-137/58-78


-T-max:  96.9


-Heart rate:  66-71


-Pulse ox 95-97% on room air


Lab Results


-WBC down from 16.17-15.40


-K+ down from 3.9-3.6


-BUN up from 52-53


-Cre 2.0


-GFR 32


-Blood glucose POC from 107-267


-D-dimer down from 0.5-0.49


-Mg 2.1


-Ferritin up from 


-CRP up from 2.0-5.5


PLAN


-awaiting nursing home placement


-speech therapy to complete swallow evaluation


-Repeat labs as needed


-PT and OT to continue to treat patient




















- Plan


Plan:: 








COVID-19


COPD (chronic obstructive pulmonary disease)


-Continue Symbicort


-PRN albuterol


-Repeat labs as needed





Atrial fibrillation with controlled ventricular rate


-Continue metoprolol


-Monitor for hypoxemia


 


Acute on chronic renal failure 2/2 Volume depletion, improved


Hyponatremia, resolved


Iron deficiency anemia


B12 deficiency


-Monitor urine output


-Renally dosed medications





Hypertension


-Metoprolol


-Continue Lasix 20 mg daily


-Amlodipine 5 mg at night


-PRN Hydralazine





Diabetes mellitus, HdG8x-0.9%


-Continue home insulin 


-Accu-checks before meals and bedtime


-Hypoglycemia protocol


-Hold PO meds


- sliding scale insulin





Gastroesophageal reflux disease


-Continue home omeprazole





Dyslipidemia


- Continue home statin





Hypoalbuminemia


-Dietary consult





Depression


-Continue home sertraline





Orthostatic, resolved





PROPHYLAXIS


DVT- home Xarelto


GI- home omeprazole





CODE STATUS: FULL CODE





DISPOSITION:


Awaiting placement.  Placement 09/28/20 at [facility].

## 2020-09-24 NOTE — PCM.PN
- General Info


Date of Service: 09/24/20


Admission Dx/Problem (Free Text): 


                           Admission Diagnosis/Problem





Admission Diagnosis/Problem      Hypoxia








Subjective Update: 





Doing well.  No complaints.  Eating well.  Coughing less.  


Functional Status: Reports: Pain Controlled, Tolerating Diet, Ambulating (with 

therapy and nursing staff), Urinating





- Review of Systems


General: Reports: No Symptoms


HEENT: Reports: No Symptoms


Pulmonary: Reports: Cough (decreased).  Denies: Shortness of Breath, Pleuritic 

Chest Pain, Sputum


Cardiovascular: Reports: No Symptoms.  Denies: Palpitations, Edema


Gastrointestinal: Reports: No Symptoms.  Denies: Constipation, Diarrhea, Nausea,

Vomiting


Genitourinary: Reports: No Symptoms


Musculoskeletal: Reports: No Symptoms


Skin: Reports: Rash (tinea to left groin and buttocks healing)


Neurological: Reports: No Symptoms


Psychiatric: Reports: Confusion (oriented to self only)





- Patient Data


Vitals - Most Recent: 


                                Last Vital Signs











Temp  98.2 F   09/24/20 09:02


 


Pulse  80   09/24/20 09:04


 


Resp  18   09/24/20 09:02


 


BP  145/83 H  09/24/20 09:04


 


Pulse Ox  96   09/24/20 09:02








                                        





Orthostatic Blood Pressure [     71/53


Standing]                        


Orthostatic Blood Pressure [     100/71


Sitting]                         


Orthostatic Blood Pressure [     121/52


Supine]                          








Weight - Most Recent: 140 lb





- Exam


Quality Assessment: DVT Prophylaxis.  No: Supplemental Oxygen


General: Alert, Cooperative, No Acute Distress


HEENT: Pupils Equal, Mucous Membr. Moist/Pink


Neck: Supple, Trachea Midline.  No: Lymphadenopathy


Lungs: Normal Respiratory Effort, Crackles (right posterior base)


Cardiovascular: Regular Rate, Regular Rhythm, No Murmurs


GI/Abdominal Exam: Normal Bowel Sounds, Soft, Non-Tender, No Distention


 (Male) Exam: Deferred


Back Exam: Normal Inspection, Full Range of Motion


Extremities: Normal Inspection, Normal Range of Motion, Non-Tender, No Pedal 

Edema, Normal Capillary Refill


Peripheral Pulses: 2+: Radial (L), Radial (R), Dorsalis Pedis (L), Dorsalis 

Pedis (R)


Skin: Warm, Dry, Intact, Rash (tinea to left groin and buttocks healing)


Neurological: No New Focal Deficit


Psy/Mental Status: Alert, Normal Affect, Normal Mood





Sepsis Event Note





- Evaluation


Sepsis Screening Result: No Definite Risk





- Problem List & Annotations


(1) Acute on chronic renal failure


SNOMED Code(s): 431150375


   Code(s): N17.9 - ACUTE KIDNEY FAILURE, UNSPECIFIED; N18.9 - CHRONIC KIDNEY 

DISEASE, UNSPECIFIED   Status: Acute   Priority: High   Current Visit: Yes   


Qualifiers: 


   Acute renal failure type: unspecified   Chronic kidney disease stage: 

unspecified stage   Qualified Code(s): N17.9 - Acute kidney failure, 

unspecified; N18.9 - Chronic kidney disease, unspecified   





(2) Atrial fibrillation with controlled ventricular rate


SNOMED Code(s): 23239757


   Code(s): I48.91 - UNSPECIFIED ATRIAL FIBRILLATION   Status: Chronic   

Priority: High   Current Visit: Yes   





(3) B12 deficiency


SNOMED Code(s): 085805250


   Code(s): E53.8 - DEFICIENCY OF OTHER SPECIFIED B GROUP VITAMINS   Status: 

Chronic   Priority: Medium   Current Visit: Yes   





(4) COVID-19


SNOMED Code(s): 964523178


   Code(s): U07.1 - COVID-19   Status: Acute   Priority: High   Current Visit: 

Yes   





(5) Depression


SNOMED Code(s): 14901917


   Code(s): F32.9 - MAJOR DEPRESSIVE DISORDER, SINGLE EPISODE, UNSPECIFIED   

Status: Chronic   Priority: Medium   Current Visit: Yes   


Qualifiers: 


   Depression Type: unspecified   Qualified Code(s): F32.9 - Major depressive 

disorder, single episode, unspecified   





(6) Dyslipidemia


SNOMED Code(s): 182379188


   Code(s): E78.5 - HYPERLIPIDEMIA, UNSPECIFIED   Status: Chronic   Priority: 

Medium   Current Visit: Yes   





(7) Hypoalbuminemia


SNOMED Code(s): 896018191


   Code(s): E88.09 - [facility] DISORDERS OF PLASMA-PROTEIN METABOLISM, NEC   Status: 

Acute   Priority: High   Current Visit: Yes   





(8) Hyponatremia


SNOMED Code(s): 26894113


   Code(s): E87.1 - HYPO-OSMOLALITY AND HYPONATREMIA   Status: Resolved   

Priority: Low   Current Visit: Yes   





(9) Iron deficiency


SNOMED Code(s): 76155346


   Code(s): E61.1 - IRON DEFICIENCY   Status: Acute   Priority: High   Current 

Visit: Yes   





(10) Orthostasis


SNOMED Code(s): 76673815


   Code(s): I95.1 - ORTHOSTATIC HYPOTENSION   Status: Resolved   Priority: High 

 Current Visit: Yes   





(11) Diabetes


SNOMED Code(s): 43390171


   Code(s): E11.9 - TYPE 2 DIABETES MELLITUS WITHOUT COMPLICATIONS   Status: 

Chronic   Priority: High   Current Visit: Yes   


Qualifiers: 


   Diabetes mellitus type: type 2   Diabetes mellitus long term insulin use: 

unspecified long term insulin use status   Diabetes mellitus complication 

status: without complication   Qualified Code(s): E11.9 - Type 2 diabetes 

mellitus without complications   





(12) GERD (gastroesophageal reflux disease)


SNOMED Code(s): 570450854


   Code(s): K21.9 - GASTRO-ESOPHAGEAL REFLUX DISEASE WITHOUT ESOPHAGITIS   

Status: Chronic   Priority: Medium   Current Visit: No   


Qualifiers: 


   Esophagitis presence: without esophagitis   Qualified Code(s): K21.9 - 

Gastro-esophageal reflux disease without esophagitis   





(13) Hypertension


SNOMED Code(s): 97435730


   Code(s): I10 - ESSENTIAL (PRIMARY) HYPERTENSION   Status: Chronic   Priority:

High   Current Visit: No   


Qualifiers: 


   Hypertension type: unspecified   Qualified Code(s): I10 - Essential (primary)

hypertension   





(14) COPD (chronic obstructive pulmonary disease)


SNOMED Code(s): 28369418


   Code(s): J44.9 - CHRONIC OBSTRUCTIVE PULMONARY DISEASE, UNSPECIFIED   Status:

Chronic   Priority: High   Current Visit: No   


Qualifiers: 


   COPD type: unspecified COPD   Qualified Code(s): J44.9 - Chronic obstructive 

pulmonary disease, unspecified   





- Problem List Review


Problem List Initiated/Reviewed/Updated: Yes





- Assessment


Assessment:: 





9/11/20


1 day of weakness and low O2 sats


Lives in [place], + COVID patients in past week


Pulse ox on admission 89%


CXR with cardiomegaly only, no lung parenchyma changes


Orthostatic --> given 500ml IVF bolus--> still orthostatic--> 2nd bolus


Lab results:


- Hb 13.2, Hct 40.1


- Na 133, GFR 19, glucose 165


- Troponin negative


Heart rate on admission 98


EKG with a. fib., rate 84


RVR will likely increase O2 needs- COVID positive


GFR baseline 26, 19 on admission


Not on any nephrotoxic meds 


Decreased oral intake


Admission /81


Hypertension home management with metoprolol


Diabetes home management with glimepiride, sitagliptin and insulin


Glucose on admission 165


Smoked 1.5 ppd for 51 years; Quit in 2005 


PLAN


- Start Remdesivir


- Start Dexamethasone


- PRN albuterol


- Continue Symbicort


- Hold metoprolol due to orthostatic hypotension


- Monitor for hypoxemia


- Monitor urine output


- Renally dosed medications


- Repeat labs and replace electrolytes as needed  


- Hold metoprolol


- PRN Hydralazine


- Continue home insulin at same dose for now


- No premeals, adjust with new measurements as needed


- New HbA1c


- Accu-checks before meals and bedtime


- Hypoglycemia protocol


- Hold PO meds


- Continue home omeprazole


- Continue home statin


- Dietary consult


- Continue home sertraline


Patient will be admitted to med surg floor, will be started on Remdesivir and 

dexamethasone. 





9/12/20


No longer on oxygen supplementation since 1:30 PM on 9/11/2020


Vital signs trend


Blood pressure: 117 264/50 3-91


T-max 98.2


Heart rate: 66-98


Pulse ox greater than 89%


Lab results


WBC down from 6.96-4.05


Hemoglobin down from 13.2-11.7


D-dimer stable from 0.72 2.71


Sodium down from 1 33-1 32


GFR up from 19-26


B12 87


BNP up from 2653-0340


CRP 5.2


Iron 26


Glucose -258


A1c 6.9%


PLAN


-Continue Remdesivir day 2 out of 5


-Continue dexamethasone day 2 out of 10


-Continue Symbicort


-PRN albuterol


-Repeat labs as needed


-Continue to hold metoprolol


-Monitor for hypoxemia


-Monitor urine output


-Renally dosed medications


-Repeat labs and replace electrolytes as needed  


-Hold metoprolol


-PRN Hydralazine


-Continue home insulin at same dose for now


-Accu-checks before meals and bedtime


-Hypoglycemia protocol


-Hold PO meds


-Continue home omeprazole


- Continue home statin


Patient will remain admitted for antiviral treatment for COVID.


Length of stay at least 5 days due to length of treatment required for COVID.





9/13/20


Shortness of breath has resolved


Has not required oxygen supplementation the past 24 hours


Vital signs trend


Blood pressure 948693/7289


T-max 98.4


Heart rate 66 to 88/min


Pulse ox greater than 97% on room air


New lab results


WBC up from 4.05-7.54


Hemoglobin up from 11.7-11.9


Platelets up from 193-235


D-dimer down from 0.71 2.5


Sodium stable from 1 


Chloride up from 94-96


GFR up from 26-30


Magnesium up from 1.9-2.1


CRP down from 5.2-2.8


Glucose trend 171-220





9/14/2020


Patient is asymptomatic.


Vital signs are stable and pulse ox is in the mid to upper 90s on room air.


CRP 2.0 and normal d-dimer.


Sodium up to 136.


BUN to creatinine ratio is still elevated at 30.0.


Estimated GFR is 30 and stable.  This is his chronic stable state.


Episode of blood sugar of 300 today.


Started sliding scale insulin.





9/15/2020


Patient is feeling well.


Vital signs stable with oxygenation in the mid upper 90s and afebrile.


Systolic blood pressure in the 160s today


Labs held today.


Blood sugars improved.


Awaiting placement.





9/16/2020


Patient is stable without any significant changes.


Blood pressure is ranging in the 150s up to 170s.  Afebrile.  Oxygen saturation 

on room air ranging from 90 to 100%, weight stable at approximately 66 kg


Hemoglobin and white count are stable at 12.0 and 7.9 respectively.


GFR 36 with creatinine of 1.8 which is improved.


BUN stable at 62.  Elevation likely secondary to protein catabolism from 

dexamethasone.


Blood sugars improved ranging from 120 up to 262


Still awaiting placement


Patient has not been getting his ethacrynic acid because it is nonformulary.





9/17/2020


Blood pressure continues to be elevated.  He was started on low-dose Lasix this 

morning.  He would likely benefit from additional treatment.


Improvement in glucose. But continues to be elevated.  This should resolve once 

he is off of dexamethasone.


No significant change overnight.





9/18/2020


Blood pressure is improved with the addition of amlodipine.


Patient has no complaints.


Glucose continues to be elevated secondary to dexamethasone





9/19/2020


Blood pressure is improved but continues to be elevated.


Blood pressure ranges from 122//56


Fingerstick blood sugars also continue to be elevated.


Still off oxygen and generally doing either better or stable on all parameters.





9/20/2020


Blood pressure improved with the last few systolic blood pressures between 149 

and 155


Blood sugars continue to be elevated likely secondary to dexamethasone


Estimated GFR 34.


BUN and creatinine both stable at 61 and 1.9, respectively





09/21/2020


Blood pressures 148/45.


Dexamethasone discontinued yesterday. 


Blood sugars this morning 82.  Insulin held.  





09/22/2020


Blood pressures 139/67


Blood sugars 107 and 145 today.


Tinea to groin and buttocks.  Lotrimin topically started. 


WBC 16.7 likely due to IV decadron.  No bandemia or signs of symptoms of an 

infective process.


BUN 52


CRE 2.0


GFR 32


Nothing acute appreciated on chest xray.





09/23/20


Pt stable without any changes.  


Decreased cough noted


Awaiting placement to [facility] on Monday


Isolation precautions removed.


Vital signs trend


-Blood pressure:  105-148/58-90


-T-max:  98.2


-Heart rate:  71-95


-Pulse ox 94-98% on room air


Lab Results


no labs drawn today


PLAN


-awaiting nursing home placement


-Repeat labs as needed


-PT and OT to continue to treat patient




















- Plan


Plan:: 








COVID-19


COPD (chronic obstructive pulmonary disease)


-Continue Symbicort


-PRN albuterol


-Repeat labs as needed





Atrial fibrillation with controlled ventricular rate


-Continue metoprolol


-Monitor for hypoxemia


 


Acute on chronic renal failure 2/2 Volume depletion, improved


Hyponatremia, resolved


Iron deficiency anemia


B12 deficiency


-Monitor urine output


-Renally dosed medications





Hypertension


-Metoprolol


-Continue Lasix 20 mg daily


-Amlodipine 5 mg at night


-PRN Hydralazine





Diabetes mellitus, [id number]-4.9%


-Continue home insulin 


-Accu-checks before meals and bedtime


-Hypoglycemia protocol


-Hold PO meds


- sliding scale insulin





Gastroesophageal reflux disease


-Continue home omeprazole





Dyslipidemia


- Continue home statin





Hypoalbuminemia


-Dietary consult





Depression


-Continue home sertraline





Orthostatic, resolved





PROPHYLAXIS


DVT- home Xarelto


GI- home omeprazole





CODE STATUS: FULL CODE





DISPOSITION:


Awaiting placement.  Placement 09/28/20 at [facility].

## 2020-09-25 NOTE — PCM.PN
- General Info


Date of Service: 09/25/20


Admission Dx/Problem (Free Text): 


                           Admission Diagnosis/Problem





Admission Diagnosis/Problem      Hypoxia








Subjective Update: 





No complaints.  Eating well.  Waiting placement on 09/28/20 to [facility].


Functional Status: Reports: Pain Controlled, Tolerating Diet, Urinating 

(incontinent)





- Review of Systems


General: Reports: No Symptoms


HEENT: Reports: No Symptoms


Pulmonary: Reports: Cough.  Denies: Sputum


Cardiovascular: Reports: No Symptoms.  Denies: Edema


Gastrointestinal: Reports: No Symptoms


Genitourinary: Reports: Incontinence


Musculoskeletal: Reports: No Symptoms


Skin: Reports: Rash


Neurological: Reports: Confusion


Psychiatric: Reports: No Symptoms





- Patient Data


Weight - Most Recent: 138 lb 1.6 oz





- Exam


Quality Assessment: DVT Prophylaxis.  No: Supplemental Oxygen


General: Alert, Cooperative, No Acute Distress.  No: Oriented (oriented to self 

only)


HEENT: Pupils Equal, Mucous Membr. Moist/Pink


Neck: Supple, Trachea Midline.  No: Lymphadenopathy


Lungs: Crackles (bases)


Cardiovascular: Regular Rate, Regular Rhythm, No Murmurs


GI/Abdominal Exam: Normal Bowel Sounds, Soft, Non-Tender, No Distention


 (Male) Exam: Deferred


Back Exam: Normal Inspection, Full Range of Motion


Extremities: Normal Inspection, Normal Range of Motion, Non-Tender, No Pedal 

Edema, Normal Capillary Refill


Peripheral Pulses: 2+: Radial (L), Radial (R), Dorsalis Pedis (L), Dorsalis 

Pedis (R)


Skin: Warm, Dry, Intact, Rash (tinea noted to left groin and buttocks)


Neurological: No New Focal Deficit


Psy/Mental Status: Alert, Normal Affect, Normal Mood





Sepsis Event Note





- Evaluation


Sepsis Screening Result: No Definite Risk





- Problem List & Annotations


(1) Acute on chronic renal failure


SNOMED Code(s): 635262340


   Code(s): N17.9 - ACUTE KIDNEY FAILURE, UNSPECIFIED; N18.9 - CHRONIC KIDNEY 

DISEASE, UNSPECIFIED   Status: Acute   Priority: High   Current Visit: Yes   


Qualifiers: 


   Acute renal failure type: unspecified   Chronic kidney disease stage: 

unspecified stage   Qualified Code(s): N17.9 - Acute kidney failure, 

unspecified; N18.9 - Chronic kidney disease, unspecified   





(2) Atrial fibrillation with controlled ventricular rate


SNOMED Code(s): 82488801


   Code(s): I48.91 - UNSPECIFIED ATRIAL FIBRILLATION   Status: Chronic   

Priority: High   Current Visit: Yes   





(3) B12 deficiency


SNOMED Code(s): 630983325


   Code(s): E53.8 - DEFICIENCY OF OTHER SPECIFIED B GROUP VITAMINS   Status: 

Chronic   Priority: Medium   Current Visit: Yes   





(4) COVID-19


SNOMED Code(s): 506944655


   Code(s): U07.1 - COVID-19   Status: Acute   Priority: High   Current Visit: 

Yes   





(5) Depression


SNOMED Code(s): 11201835


   Code(s): F32.9 - MAJOR DEPRESSIVE DISORDER, SINGLE EPISODE, UNSPECIFIED   

Status: Chronic   Priority: Medium   Current Visit: Yes   


Qualifiers: 


   Depression Type: unspecified   Qualified Code(s): F32.9 - Major depressive 

disorder, single episode, unspecified   





(6) Dyslipidemia


SNOMED Code(s): 051860936


   Code(s): E78.5 - HYPERLIPIDEMIA, UNSPECIFIED   Status: Chronic   Priority: 

Medium   Current Visit: Yes   





(7) Hypoalbuminemia


SNOMED Code(s): 904669138


   Code(s): E88.09 - OTH DISORDERS OF PLASMA-PROTEIN METABOLISM, NEC   Status: 

Acute   Priority: High   Current Visit: Yes   





(8) Hyponatremia


SNOMED Code(s): 27367562


   Code(s): E87.1 - HYPO-OSMOLALITY AND HYPONATREMIA   Status: Resolved   

Priority: Low   Current Visit: Yes   





(9) Iron deficiency


SNOMED Code(s): 89757517


   Code(s): E61.1 - IRON DEFICIENCY   Status: Acute   Priority: High   Current 

Visit: Yes   





(10) Orthostasis


SNOMED Code(s): 06066328


   Code(s): I95.1 - ORTHOSTATIC HYPOTENSION   Status: Resolved   Priority: High 

 Current Visit: Yes   





(11) Diabetes


SNOMED Code(s): 99962927


   Code(s): E11.9 - TYPE 2 DIABETES MELLITUS WITHOUT COMPLICATIONS   Status: 

Chronic   Priority: High   Current Visit: Yes   


Qualifiers: 


   Diabetes mellitus type: type 2   Diabetes mellitus long term insulin use: 

unspecified long term insulin use status   Diabetes mellitus complication 

status: without complication   Qualified Code(s): E11.9 - Type 2 diabetes 

mellitus without complications   





(12) GERD (gastroesophageal reflux disease)


SNOMED Code(s): 586683844


   Code(s): K21.9 - GASTRO-ESOPHAGEAL REFLUX DISEASE WITHOUT ESOPHAGITIS   

Status: Chronic   Priority: Medium   Current Visit: No   


Qualifiers: 


   Esophagitis presence: without esophagitis   Qualified Code(s): K21.9 - 

Gastro-esophageal reflux disease without esophagitis   





(13) Hypertension


SNOMED Code(s): 22550176


   Code(s): I10 - ESSENTIAL (PRIMARY) HYPERTENSION   Status: Chronic   Priority:

High   Current Visit: No   


Qualifiers: 


   Hypertension type: unspecified   Qualified Code(s): I10 - Essential (primary)

hypertension   





(14) COPD (chronic obstructive pulmonary disease)


SNOMED Code(s): 05876438


   Code(s): J44.9 - CHRONIC OBSTRUCTIVE PULMONARY DISEASE, UNSPECIFIED   Status:

Chronic   Priority: High   Current Visit: No   


Qualifiers: 


   COPD type: unspecified COPD   Qualified Code(s): J44.9 - Chronic obstructive 

pulmonary disease, unspecified   





- Problem List Review


Problem List Initiated/Reviewed/Updated: Yes





- Assessment


Assessment:: 





9/11/20


1 day of weakness and low O2 sats


Lives in [place], + COVID patients in past week


Pulse ox on admission 89%


CXR with cardiomegaly only, no lung parenchyma changes


Orthostatic --> given 500ml IVF bolus--> still orthostatic--> 2nd bolus


Lab results:


- Hb 13.2, Hct 40.1


- Na 133, GFR 19, glucose 165


- Troponin negative


Heart rate on admission 98


EKG with a. fib., rate 84


RVR will likely increase O2 needs- COVID positive


GFR baseline 26, 19 on admission


Not on any nephrotoxic meds 


Decreased oral intake


Admission /81


Hypertension home management with metoprolol


Diabetes home management with glimepiride, sitagliptin and insulin


Glucose on admission 165


Smoked 1.5 ppd for 51 years; Quit in 2005 


PLAN


- Start Remdesivir


- Start Dexamethasone


- PRN albuterol


- Continue Symbicort


- Hold metoprolol due to orthostatic hypotension


- Monitor for hypoxemia


- Monitor urine output


- Renally dosed medications


- Repeat labs and replace electrolytes as needed  


- Hold metoprolol


- PRN Hydralazine


- Continue home insulin at same dose for now


- No premeals, adjust with new measurements as needed


- New HbA1c


- Accu-checks before meals and bedtime


- Hypoglycemia protocol


- Hold PO meds


- Continue home omeprazole


- Continue home statin


- Dietary consult


- Continue home sertraline


Patient will be admitted to med surg floor, will be started on Remdesivir and 

dexamethasone. 





9/12/20


No longer on oxygen supplementation since 1:30 PM on 9/11/2020


Vital signs trend


Blood pressure: 117 264/50 3-91


T-max 98.2


Heart rate: 66-98


Pulse ox greater than 89%


Lab results


WBC down from 6.96-4.05


Hemoglobin down from 13.2-11.7


D-dimer stable from 0.72 2.71


Sodium down from 1 33-1 32


GFR up from 19-26


B12 87


BNP up from 4253-6414


CRP 5.2


Iron 26


Glucose -258


A1c 6.9%


PLAN


-Continue Remdesivir day 2 out of 5


-Continue dexamethasone day 2 out of 10


-Continue Symbicort


-PRN albuterol


-Repeat labs as needed


-Continue to hold metoprolol


-Monitor for hypoxemia


-Monitor urine output


-Renally dosed medications


-Repeat labs and replace electrolytes as needed  


-Hold metoprolol


-PRN Hydralazine


-Continue home insulin at same dose for now


-Accu-checks before meals and bedtime


-Hypoglycemia protocol


-Hold PO meds


-Continue home omeprazole


- Continue home statin


Patient will remain admitted for antiviral treatment for COVID.


Length of stay at least 5 days due to length of treatment required for COVID.





9/13/20


Shortness of breath has resolved


Has not required oxygen supplementation the past 24 hours


Vital signs trend


Blood pressure 187549/7289


T-max 98.4


Heart rate 66 to 88/min


Pulse ox greater than 97% on room air


New lab results


WBC up from 4.05-7.54


Hemoglobin up from 11.7-11.9


Platelets up from 193-235


D-dimer down from 0.71 2.5


Sodium stable from 1 


Chloride up from 94-96


GFR up from 26-30


Magnesium up from 1.9-2.1


CRP down from 5.2-2.8


Glucose trend 171-220





9/14/2020


Patient is asymptomatic.


Vital signs are stable and pulse ox is in the mid to upper 90s on room air.


CRP 2.0 and normal d-dimer.


Sodium up to 136.


BUN to creatinine ratio is still elevated at 30.0.


Estimated GFR is 30 and stable.  This is his chronic stable state.


Episode of blood sugar of 300 today.


Started sliding scale insulin.





9/15/2020


Patient is feeling well.


Vital signs stable with oxygenation in the mid upper 90s and afebrile.


Systolic blood pressure in the 160s today


Labs held today.


Blood sugars improved.


Awaiting placement.





9/16/2020


Patient is stable without any significant changes.


Blood pressure is ranging in the 150s up to 170s.  Afebrile.  Oxygen saturation 

on room air ranging from 90 to 100%, weight stable at approximately 66 kg


Hemoglobin and white count are stable at 12.0 and 7.9 respectively.


GFR 36 with creatinine of 1.8 which is improved.


BUN stable at 62.  Elevation likely secondary to protein catabolism from 

dexamethasone.


Blood sugars improved ranging from 120 up to 262


Still awaiting placement


Patient has not been getting his ethacrynic acid because it is nonformulary.





9/17/2020


Blood pressure continues to be elevated.  He was started on low-dose Lasix this 

morning.  He would likely benefit from additional treatment.


Improvement in glucose. But continues to be elevated.  This should resolve once 

he is off of dexamethasone.


No significant change overnight.





9/18/2020


Blood pressure is improved with the addition of amlodipine.


Patient has no complaints.


Glucose continues to be elevated secondary to dexamethasone





9/19/2020


Blood pressure is improved but continues to be elevated.


Blood pressure ranges from 122//56


Fingerstick blood sugars also continue to be elevated.


Still off oxygen and generally doing either better or stable on all parameters.





9/20/2020


Blood pressure improved with the last few systolic blood pressures between 149 

and 155


Blood sugars continue to be elevated likely secondary to dexamethasone


Estimated GFR 34.


BUN and creatinine both stable at 61 and 1.9, respectively





09/21/2020


Blood pressures 148/45.


Dexamethasone discontinued yesterday. 


Blood sugars this morning 82.  Insulin held.  





09/22/2020


Blood pressures 139/67


Blood sugars 107 and 145 today.


Tinea to groin and buttocks.  Lotrimin topically started. 


WBC 16.7 likely due to IV decadron.  No bandemia or signs of symptoms of an 

infective process.


BUN 52


CRE 2.0


GFR 32


Nothing acute appreciated on chest xray.





09/24/20


Pt stable without any changes.  


Decreased cough noted


Awaiting placement to [facility] on Monday


Isolation precautions removed.


Vital signs trend


-Blood pressure:  105-148/58-90


-T-max:  98.2


-Heart rate:  71-95


-Pulse ox 94-98% on room air


Lab Results


no labs drawn today


PLAN


-awaiting nursing home placement


-Repeat labs as needed


-PT and OT to continue to treat patient








09/25/20


Pt stable without any changes.  


Awaiting placement to [facility] on 09/28/20





Vital signs trend


-Blood pressure:  125-147/80-99


-T-max:  98.1


-Heart rate:  


-Pulse ox 96-99% on room air


Lab Results


no labs drawn today


PLAN


-awaiting nursing home placement


-Repeat labs as needed


-PT and OT to continue to treat patient

















- Plan


Plan:: 








COVID-19


COPD (chronic obstructive pulmonary disease)


-Continue Symbicort


-PRN albuterol


-Repeat labs as needed





Atrial fibrillation with controlled ventricular rate


-Continue metoprolol


-Monitor for hypoxemia


 


Acute on chronic renal failure 2/2 Volume depletion, improved


Hyponatremia, resolved


Iron deficiency anemia


B12 deficiency


-Monitor urine output


-Renally dosed medications





Hypertension


-Metoprolol


-Continue Lasix 20 mg daily


-Amlodipine 5 mg at night


-PRN Hydralazine





Diabetes mellitus, [id number]-0.9%


-Continue home insulin 


-Accu-checks before meals and bedtime


-Hypoglycemia protocol


-Hold PO meds


- sliding scale insulin





Gastroesophageal reflux disease


-Continue home omeprazole





Dyslipidemia


- Continue home statin





Hypoalbuminemia








Depression


-Continue home sertraline





Orthostatic, resolved





PROPHYLAXIS


DVT- home Xarelto


GI- home omeprazole





CODE STATUS: FULL CODE





DISPOSITION:


Awaiting placement.  Placement 09/28/20 at [facility].

## 2020-09-26 NOTE — PCM.PN
- General Info


Date of Service: 09/26/20


Subjective Update: 





Feeling okay


Nursing reports patient is less communicative


Bowel movement today








- Patient Data


Vitals - Most Recent: 


                                Last Vital Signs











Temp  97.9 F   09/26/20 08:22


 


Pulse  91   09/26/20 08:53


 


Resp  20   09/26/20 08:22


 


BP  117/69   09/26/20 08:38


 


Pulse Ox  96   09/26/20 10:04








                                        





Orthostatic Blood Pressure [     71/53


Standing]                        


Orthostatic Blood Pressure [     100/71


Sitting]                         


Orthostatic Blood Pressure [     121/52


Supine]                          








Weight - Most Recent: 63.004 kg





- Exam


General: Alert, Cooperative, No Acute Distress.  No: Oriented


HEENT: Pupils Equal, Pupils Reactive


Neck: Supple, Trachea Midline


Lungs: Clear to Auscultation, Normal Respiratory Effort.  No: Decreased Breath 

Sounds, Crackles, Rales, Rhonchi, Rub, Stridor, Wheezing


Cardiovascular: Regular Rate, Regular Rhythm.  No: Murmurs, Gallops, Rubs


GI/Abdominal Exam: Normal Bowel Sounds, Soft, Non-Tender.  No: Distended, 

Guarding, Rigid


Extremities: Normal Inspection


Peripheral Pulses: 2+: Radial (L), Radial (R)


Neurological: No New Focal Deficit





Sepsis Event Note





- Evaluation


Sepsis Screening Result: No Definite Risk





- Problem List & Annotations


(1) Atrial fibrillation with controlled ventricular rate


SNOMED Code(s): 68764988


   Code(s): I48.91 - UNSPECIFIED ATRIAL FIBRILLATION   Status: Chronic   

Priority: High   Current Visit: Yes   





(2) Former smoker


SNOMED Code(s): 9463045


   Code(s): Z87.891 - PERSONAL HISTORY OF NICOTINE DEPENDENCE   Status: Acute   

Current Visit: Yes   





(3) Hypoalbuminemia


SNOMED Code(s): 932019865


   Code(s): E88.09 - OTH DISORDERS OF PLASMA-PROTEIN METABOLISM, NEC   Status: 

Acute   Priority: High   Current Visit: Yes   





(4) Hyponatremia


SNOMED Code(s): 73058074


   Code(s): E87.1 - HYPO-OSMOLALITY AND HYPONATREMIA   Status: Resolved   

Priority: Low   Current Visit: Yes   





(5) Volume depletion


SNOMED Code(s): 375893286


   Code(s): E86.9 - VOLUME DEPLETION, UNSPECIFIED   Status: Acute   Current 

Visit: Yes   





(6) Normocytic normochromic anemia


SNOMED Code(s): 42624901


   Code(s): D64.9 - ANEMIA, UNSPECIFIED   Status: Acute   Current Visit: Yes   





(7) Acute on chronic renal failure


SNOMED Code(s): 472211882


   Code(s): N17.9 - ACUTE KIDNEY FAILURE, UNSPECIFIED; N18.9 - CHRONIC KIDNEY 

DISEASE, UNSPECIFIED   Status: Acute   Priority: High   Current Visit: Yes   


Qualifiers: 


   Acute renal failure type: unspecified   Chronic kidney disease stage: 

unspecified stage   Qualified Code(s): N17.9 - Acute kidney failure, 

unspecified; N18.9 - Chronic kidney disease, unspecified   





(8) COPD (chronic obstructive pulmonary disease)


SNOMED Code(s): 90609910


   Code(s): J44.9 - CHRONIC OBSTRUCTIVE PULMONARY DISEASE, UNSPECIFIED   Status:

Chronic   Priority: High   Current Visit: No   


Qualifiers: 


   COPD type: unspecified COPD   Qualified Code(s): J44.9 - Chronic obstructive 

pulmonary disease, unspecified   





(9) COVID-19


SNOMED Code(s): 290410995


   Code(s): U07.1 - COVID-19   Status: Acute   Priority: High   Current Visit: 

Yes   





(10) Diabetes


SNOMED Code(s): 04241606


   Code(s): E11.9 - TYPE 2 DIABETES MELLITUS WITHOUT COMPLICATIONS   Status: 

Chronic   Priority: High   Current Visit: Yes   


Qualifiers: 


   Diabetes mellitus type: type 2   Diabetes mellitus long term insulin use: 

unspecified long term insulin use status   Diabetes mellitus complication 

status: without complication   Qualified Code(s): E11.9 - Type 2 diabetes 

mellitus without complications   





(11) GERD (gastroesophageal reflux disease)


SNOMED Code(s): 422998297


   Code(s): K21.9 - GASTRO-ESOPHAGEAL REFLUX DISEASE WITHOUT ESOPHAGITIS   

Status: Chronic   Priority: Medium   Current Visit: No   


Qualifiers: 


   Esophagitis presence: without esophagitis   Qualified Code(s): K21.9 - 

Gastro-esophageal reflux disease without esophagitis   





(12) Hyperlipidemia


SNOMED Code(s): 82566401


   Code(s): E78.5 - HYPERLIPIDEMIA, UNSPECIFIED   Status: Acute   Current Visit:

No   





(13) Hypertension


SNOMED Code(s): 58977460


   Code(s): I10 - ESSENTIAL (PRIMARY) HYPERTENSION   Status: Chronic   Priority:

High   Current Visit: No   


Qualifiers: 


   Hypertension type: unspecified   Qualified Code(s): I10 - Essential (primary)

hypertension   





(14) Depression


SNOMED Code(s): 97222278


   Code(s): F32.9 - MAJOR DEPRESSIVE DISORDER, SINGLE EPISODE, UNSPECIFIED   

Status: Chronic   Priority: Medium   Current Visit: Yes   


Qualifiers: 


   Depression Type: unspecified   Qualified Code(s): F32.9 - Major depressive 

disorder, single episode, unspecified   





(15) Dyslipidemia


SNOMED Code(s): 581432600


   Code(s): E78.5 - HYPERLIPIDEMIA, UNSPECIFIED   Status: Chronic   Priority: 

Medium   Current Visit: Yes   





(16) B12 deficiency


SNOMED Code(s): 899690121


   Code(s): E53.8 - DEFICIENCY OF OTHER SPECIFIED B GROUP VITAMINS   Status: 

Chronic   Priority: Medium   Current Visit: Yes   





(17) Iron deficiency


SNOMED Code(s): 23485728


   Code(s): E61.1 - IRON DEFICIENCY   Status: Acute   Priority: High   Current 

Visit: Yes   





(18) Acute hypoactive delirium due to another medical condition


SNOMED Code(s): 9286281, 253117765, 314502444


   Code(s): F05 - DELIRIUM DUE TO KNOWN PHYSIOLOGICAL CONDITION   Status: Acute 

 Current Visit: Yes   





(19) Leukocytosis


SNOMED Code(s): 259896713, 512888268


   Code(s): D72.829 - ELEVATED WHITE BLOOD CELL COUNT, UNSPECIFIED   Status: 

Acute   Current Visit: Yes   





(20) MEGAN (acute kidney injury)


SNOMED Code(s): 36121384, 95700453


   Code(s): N17.9 - ACUTE KIDNEY FAILURE, UNSPECIFIED   Status: Acute   

Priority: High   Current Visit: No   





- Problem List Review


Problem List Initiated/Reviewed/Updated: Yes





- Assessment


Assessment:: 





9/11/20


1 day of weakness and low O2 sats


Lives in [place], + COVID patients in past week


Pulse ox on admission 89%


CXR with cardiomegaly only, no lung parenchyma changes


Orthostatic --> given 500ml IVF bolus--> still orthostatic--> 2nd bolus


Lab results:


- Hb 13.2, Hct 40.1


- Na 133, GFR 19, glucose 165


- Troponin negative


Heart rate on admission 98


EKG with a. fib., rate 84


RVR will likely increase O2 needs- COVID positive


GFR baseline 26, 19 on admission


Not on any nephrotoxic meds 


Decreased oral intake


Admission /81


Hypertension home management with metoprolol


Diabetes home management with glimepiride, sitagliptin and insulin


Glucose on admission 165


Smoked 1.5 ppd for 51 years; Quit in 2005 


PLAN


- Start Remdesivir


- Start Dexamethasone


- PRN albuterol


- Continue Symbicort


- Hold metoprolol due to orthostatic hypotension


- Monitor for hypoxemia


- Monitor urine output


- Renally dosed medications


- Repeat labs and replace electrolytes as needed  


- Hold metoprolol


- PRN Hydralazine


- Continue home insulin at same dose for now


- No premeals, adjust with new measurements as needed


- New HbA1c


- Accu-checks before meals and bedtime


- Hypoglycemia protocol


- Hold PO meds


- Continue home omeprazole


- Continue home statin


- Dietary consult


- Continue home sertraline


Patient will be admitted to med surg floor, will be started on Remdesivir and 

dexamethasone. 





9/12/20


No longer on oxygen supplementation since 1:30 PM on 9/11/2020


Vital signs trend


Blood pressure: 117 264/50 3-91


T-max 98.2


Heart rate: 66-98


Pulse ox greater than 89%


Lab results


WBC down from 6.96-4.05


Hemoglobin down from 13.2-11.7


D-dimer stable from 0.72 2.71


Sodium down from 1 33-1 32


GFR up from 19-26


B12 87


BNP up from 5053-7088


CRP 5.2


Iron 26


Glucose -258


A1c 6.9%


PLAN


-Continue Remdesivir day 2 out of 5


-Continue dexamethasone day 2 out of 10


-Continue Symbicort


-PRN albuterol


-Repeat labs as needed


-Continue to hold metoprolol


-Monitor for hypoxemia


-Monitor urine output


-Renally dosed medications


-Repeat labs and replace electrolytes as needed  


-Hold metoprolol


-PRN Hydralazine


-Continue home insulin at same dose for now


-Accu-checks before meals and bedtime


-Hypoglycemia protocol


-Hold PO meds


-Continue home omeprazole


- Continue home statin


Patient will remain admitted for antiviral treatment for COVID.


Length of stay at least 5 days due to length of treatment required for COVID.





9/13/20


Shortness of breath has resolved


Has not required oxygen supplementation the past 24 hours


Vital signs trend


Blood pressure 444077/7289


T-max 98.4


Heart rate 66 to 88/min


Pulse ox greater than 97% on room air


New lab results


WBC up from 4.05-7.54


Hemoglobin up from 11.7-11.9


Platelets up from 193-235


D-dimer down from 0.71 2.5


Sodium stable from 1 


Chloride up from 94-96


GFR up from 26-30


Magnesium up from 1.9-2.1


CRP down from 5.2-2.8


Glucose trend 171-220





9/14/2020


Patient is asymptomatic.


Vital signs are stable and pulse ox is in the mid to upper 90s on room air.


CRP 2.0 and normal d-dimer.


Sodium up to 136.


BUN to creatinine ratio is still elevated at 30.0.


Estimated GFR is 30 and stable.  This is his chronic stable state.


Episode of blood sugar of 300 today.


Started sliding scale insulin.





9/15/2020


Patient is feeling well.


Vital signs stable with oxygenation in the mid upper 90s and afebrile.


Systolic blood pressure in the 160s today


Labs held today.


Blood sugars improved.


Awaiting placement.





9/16/2020


Patient is stable without any significant changes.


Blood pressure is ranging in the 150s up to 170s.  Afebrile.  Oxygen saturation 

on room air ranging from 90 to 100%, weight stable at approximately 66 kg


Hemoglobin and white count are stable at 12.0 and 7.9 respectively.


GFR 36 with creatinine of 1.8 which is improved.


BUN stable at 62.  Elevation likely secondary to protein catabolism from 

dexamethasone.


Blood sugars improved ranging from 120 up to 262


Still awaiting placement


Patient has not been getting his ethacrynic acid because it is nonformulary.





9/17/2020


Blood pressure continues to be elevated.  He was started on low-dose Lasix this 

morning.  He would likely benefit from additional treatment.


Improvement in glucose. But continues to be elevated.  This should resolve once 

he is off of dexamethasone.


No significant change overnight.





9/18/2020


Blood pressure is improved with the addition of amlodipine.


Patient has no complaints.


Glucose continues to be elevated secondary to dexamethasone





9/19/2020


Blood pressure is improved but continues to be elevated.


Blood pressure ranges from 122//56


Fingerstick blood sugars also continue to be elevated.


Still off oxygen and generally doing either better or stable on all parameters.





9/20/2020


Blood pressure improved with the last few systolic blood pressures between 149 

and 155


Blood sugars continue to be elevated likely secondary to dexamethasone


Estimated GFR 34.


BUN and creatinine both stable at 61 and 1.9, respectively





09/21/2020


Blood pressures 148/45.


Dexamethasone discontinued yesterday. 


Blood sugars this morning 82.  Insulin held.  





09/22/2020


Blood pressures 139/67


Blood sugars 107 and 145 today.


Tinea to groin and buttocks.  Lotrimin topically started. 


WBC 16.7 likely due to IV decadron.  No bandemia or signs of symptoms of an 

infective process.


BUN 52


CRE 2.0


GFR 32


Nothing acute appreciated on chest xray.





09/24/20


Pt stable without any changes.  


Decreased cough noted


Awaiting placement to [facility] on Monday


Isolation precautions removed.


Vital signs trend


-Blood pressure:  105-148/58-90


-T-max:  98.2


-Heart rate:  71-95


-Pulse ox 94-98% on room air


Lab Results


no labs drawn today


PLAN


-awaiting nursing home placement


-Repeat labs as needed


-PT and OT to continue to treat patient








09/25/20


Pt stable without any changes.  


Awaiting placement to [facility] on 09/28/20


Vital signs trend


-Blood pressure:  125-147/80-99


-T-max:  98.1


-Heart rate:  


-Pulse ox 96-99% on room air


Lab Results


no labs drawn today


PLAN


-awaiting nursing home placement


-Repeat labs as needed


-PT and OT to continue to treat patient





09/26/20


Patient has remained stable


Nursing is reporting patient is less interactive and appears more confused


Vital signs trend


-Blood pressure: 054895/60 289


-T-max 99.1 at 9 PM yesterday


-Heart rate 75-98


-Pulse ox greater than 91% on room air


Lab results


-WBC up from 15.4-18.28, no changes in differential


-GFR stable at 32





- Plan


Plan:: 





Acute deconditioning


-SNF placement





Acute hypoactive delirium secondary to extended hospital stay


-Let me sleep protocol


-Start Seroquel low-dose tonight





COPD (chronic obstructive pulmonary disease), stable


-Continue Symbicort


-PRN albuterol


-Repeat labs as needed





Atrial fibrillation with controlled ventricular rate, stable


-Continue metoprolol


-Monitor for hypoxemia


 


Chronic kidney disease stable


Iron deficiency anemia, stable


B12 deficiency


-Monitor urine output


-Renally dosed medications


-Supplementation for B12





Hypertension, stable


-Continue metoprolol and amlodipine


-PRN Hydralazine





Diabetes mellitus, VgI0m-4.9%


-Continue home insulin 


-Accu-checks before meals and bedtime


-Hypoglycemia protocol


-Hold PO meds





Gastroesophageal reflux disease


-Continue home omeprazole





Dyslipidemia


- Continue home statin





Depression


-Continue home sertraline





COVID-19, treated


Orthostatic, resolved


Hyponatremia, resolved


Acute kidney injury, resolved





PROPHYLAXIS


DVT- home Xarelto


GI- home omeprazole





CODE STATUS: FULL CODE





DISPOSITION:


Awaiting placement.  Placement 09/28/20 at [facility].


COVID test to be performed tomorrow.

## 2020-09-27 NOTE — CT
Head CT

 

Technique: Multiple axial sections through the brain were obtained.  

Intravenous contrast was not utilized.

 

Comparison: Prior head CT study of 07/11/17 is available.

 

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 

parietal convexity.  This is stable from prior exam.  Ventricles are 

moderately dilated.  Sulci over the convexities are moderately 

dilated.  Diminished density is noted within the periventricular and 

subcortical white matter which is felt compatible with small vessel 

ischemic demyelination change.  Lacunar infarcts are noted within the 

basal ganglia.

 

Atherosclerotic calcification is seen within the vertebral vessels and

 within the carotid siphon.  No acute calvarial finding is 

appreciated.  Diffuse mucosal thickening within the maxillary and 

ethmoid sinuses as well as sphenoid and frontal sinuses.

 

Impression:

1.  Diffuse paranasal sinus findings.  Uncertain if this is acute on 

chronic disease or due to worsening chronic disease.

2.  Senescent change as noted above.

3.  No acute intracranial abnormality is appreciated.

 

Diagnostic code #3

 

This report was dictated in MDT

## 2020-09-27 NOTE — PCM.SN.2
- Free Text/Narrative


Note: 





EPISTAXIS EPISODE





Notified by nursing staff stating patient was having epistaxis


Upon arrival to the room was informed by nursing that he was found with a napkin

that was covered in blood and there was also blood on the floor


Patient continues to be confused so is unable to really assess him for symptoms


Obvious bleeding from both nares


Rhino Rockets were placed bilaterally and repeat CBC, CMP and coags were ordered


Patient pulled out right device which was placed then again


Balloons of Rhino Rockets were secured to his cheeks





Since patient's mental status is altered and he is continuously pulling at both 

devices I have requested place him be placed on a constant observer status

## 2020-09-27 NOTE — CT
CT chest

 

Technique: Multiple axial sections through the chest were obtained.  

Intravenous contrast was utilized.  Study has been performed as a 

pulmonary angiogram protocol.

 

Comparison: Prior chest x-ray of 09/22/20.

 

Findings: Pulmonary arteries are well opacified.  No filling defects 

are seen to indicate pulmonary embolism.  Aorta shows no aneurysm.  

Diffuse atherosclerotic calcification is seen within the aorta and 

branch vessels.  Lymph nodes are seen within the mediastinum which are

 slightly prominent but most likely increase on a reactive basis.  

Coronary artery calcification is seen.  Heart is mildly enlarged.  No 

pericardial fluid is seen.  Visualized upper abdominal structures 

shows previous cholecystectomy.  Nothing acute is definitely seen 

within the upper abdomen.

 

Diffuse emphysematous changes are seen throughout both lungs.  Focal 

density is noted within the right upper lung and difficult to exclude 

minimal area of pneumonia although this may also represent more focal 

fibrosis.  No additional acute parenchymal findings are seen within 

either lung.

 

Bone window settings were reviewed.  No acute osseous finding is seen.

  Mild scattered degenerative change noted within the spine.

 

Impression:

1.  Severe emphysematous change.

2.  Focal density within the right upper lung either due to mild area 

of pneumonia or more focal area of fibrosis.

3.  Other nonacute findings as noted above.

 

Diagnostic code #3

 

This report was dictated in MDT

## 2020-09-27 NOTE — PCM.PN
- General Info


Date of Service: 09/27/20


Subjective Update: 





Nursing still reports patient is very hypoactive


They were unable to move him up to chair as he is unable to hold himself up 

anymore


Is sleeping throughout the night


Decreased oral intake








- Patient Data


Vitals - Most Recent: 


                                Last Vital Signs











Temp  97.9 F   09/27/20 08:11


 


Pulse  61   09/27/20 08:11


 


Resp  24 H  09/27/20 08:11


 


BP  134/63   09/27/20 08:11


 


Pulse Ox  91 L  09/27/20 08:11








Weight - Most Recent: 62.868 kg





- Exam


General: Lethargic


HEENT: Pupils Equal, Pupils Reactive.  No: Mucous Membr. Moist/Pink (Dry with 

chapped lips)


Neck: Supple, Trachea Midline


Lungs: Clear to Auscultation, Decreased Breath Sounds.  No: Normal Respiratory 

Effort (Decreased), Crackles, Rales, Rhonchi, Rub, Stridor, Wheezing


Cardiovascular: Regular Rate, Regular Rhythm.  No: Murmurs, Gallops, Rubs


GI/Abdominal Exam: Normal Bowel Sounds, Soft, Non-Tender.  No: Distended, 

Guarding, Rigid


Extremities: No Pedal Edema, Normal Capillary Refill


Peripheral Pulses: 2+: Radial (L), Radial (R)


Skin: Warm, Dry, Intact





Sepsis Event Note





- Evaluation


Sepsis Screening Result: No Definite Risk





- Problem List & Annotations


(1) Atrial fibrillation with controlled ventricular rate


SNOMED Code(s): 11154211


   Code(s): I48.91 - UNSPECIFIED ATRIAL FIBRILLATION   Status: Chronic   

Priority: High   Current Visit: Yes   





(2) Former smoker


SNOMED Code(s): 0528055


   Code(s): Z87.891 - PERSONAL HISTORY OF NICOTINE DEPENDENCE   Status: Acute   

Current Visit: Yes   





(3) Hypoalbuminemia


SNOMED Code(s): 708434632


   Code(s): E88.09 - OTH DISORDERS OF PLASMA-PROTEIN METABOLISM, NEC   Status: 

Acute   Priority: High   Current Visit: Yes   





(4) Hyponatremia


SNOMED Code(s): 06495519


   Code(s): E87.1 - HYPO-OSMOLALITY AND HYPONATREMIA   Status: Resolved   

Priority: Low   Current Visit: Yes   





(5) Volume depletion


SNOMED Code(s): 208266003


   Code(s): E86.9 - VOLUME DEPLETION, UNSPECIFIED   Status: Acute   Current 

Visit: Yes   





(6) Normocytic normochromic anemia


SNOMED Code(s): 93348030


   Code(s): D64.9 - ANEMIA, UNSPECIFIED   Status: Acute   Current Visit: Yes   





(7) Acute on chronic renal failure


SNOMED Code(s): 099907372


   Code(s): N17.9 - ACUTE KIDNEY FAILURE, UNSPECIFIED; N18.9 - CHRONIC KIDNEY 

DISEASE, UNSPECIFIED   Status: Acute   Priority: High   Current Visit: Yes   


Qualifiers: 


   Acute renal failure type: unspecified   Chronic kidney disease stage: 

unspecified stage   Qualified Code(s): N17.9 - Acute kidney failure, 

unspecified; N18.9 - Chronic kidney disease, unspecified   





(8) COPD (chronic obstructive pulmonary disease)


SNOMED Code(s): 49190419


   Code(s): J44.9 - CHRONIC OBSTRUCTIVE PULMONARY DISEASE, UNSPECIFIED   Status:

Chronic   Priority: High   Current Visit: No   


Qualifiers: 


   COPD type: unspecified COPD   Qualified Code(s): J44.9 - Chronic obstructive 

pulmonary disease, unspecified   





(9) COVID-19


SNOMED Code(s): 517485688


   Code(s): U07.1 - COVID-19   Status: Acute   Priority: High   Current Visit: 

Yes   





(10) Diabetes


SNOMED Code(s): 14931305


   Code(s): E11.9 - TYPE 2 DIABETES MELLITUS WITHOUT COMPLICATIONS   Status: 

Chronic   Priority: High   Current Visit: Yes   


Qualifiers: 


   Diabetes mellitus type: type 2   Diabetes mellitus long term insulin use: 

unspecified long term insulin use status   Diabetes mellitus complication 

status: without complication   Qualified Code(s): E11.9 - Type 2 diabetes 

mellitus without complications   





(11) GERD (gastroesophageal reflux disease)


SNOMED Code(s): 307585982


   Code(s): K21.9 - GASTRO-ESOPHAGEAL REFLUX DISEASE WITHOUT ESOPHAGITIS   

Status: Chronic   Priority: Medium   Current Visit: No   


Qualifiers: 


   Esophagitis presence: without esophagitis   Qualified Code(s): K21.9 - 

Gastro-esophageal reflux disease without esophagitis   





(12) Hyperlipidemia


SNOMED Code(s): 69590333


   Code(s): E78.5 - HYPERLIPIDEMIA, UNSPECIFIED   Status: Acute   Current Visit:

No   





(13) Hypertension


SNOMED Code(s): 97351351


   Code(s): I10 - ESSENTIAL (PRIMARY) HYPERTENSION   Status: Chronic   Priority:

High   Current Visit: No   


Qualifiers: 


   Hypertension type: unspecified   Qualified Code(s): I10 - Essential (primary)

hypertension   





(14) Depression


SNOMED Code(s): 08545854


   Code(s): F32.9 - MAJOR DEPRESSIVE DISORDER, SINGLE EPISODE, UNSPECIFIED   

Status: Chronic   Priority: Medium   Current Visit: Yes   


Qualifiers: 


   Depression Type: unspecified   Qualified Code(s): F32.9 - Major depressive 

disorder, single episode, unspecified   





(15) Dyslipidemia


SNOMED Code(s): 590638270


   Code(s): E78.5 - HYPERLIPIDEMIA, UNSPECIFIED   Status: Chronic   Priority: 

Medium   Current Visit: Yes   





(16) B12 deficiency


SNOMED Code(s): 732372135


   Code(s): E53.8 - DEFICIENCY OF OTHER SPECIFIED B GROUP VITAMINS   Status: 

Chronic   Priority: Medium   Current Visit: Yes   





(17) Iron deficiency


SNOMED Code(s): 63850789


   Code(s): E61.1 - IRON DEFICIENCY   Status: Acute   Priority: High   Current 

Visit: Yes   





(18) Acute hypoactive delirium due to another medical condition


SNOMED Code(s): 8596912, 199545479, 671686047


   Code(s): F05 - DELIRIUM DUE TO KNOWN PHYSIOLOGICAL CONDITION   Status: Acute 

 Current Visit: Yes   





(19) Leukocytosis


SNOMED Code(s): 557319090, 185917780


   Code(s): D72.829 - ELEVATED WHITE BLOOD CELL COUNT, UNSPECIFIED   Status: 

Acute   Current Visit: Yes   





(20) MEGAN (acute kidney injury)


SNOMED Code(s): 21431256, 97749323


   Code(s): N17.9 - ACUTE KIDNEY FAILURE, UNSPECIFIED   Status: Acute   

Priority: High   Current Visit: No   





- Problem List Review


Problem List Initiated/Reviewed/Updated: Yes





- Assessment


Assessment:: 





9/11/20


1 day of weakness and low O2 sats


Lives in [place], + COVID patients in past week


Pulse ox on admission 89%


CXR with cardiomegaly only, no lung parenchyma changes


Orthostatic --> given 500ml IVF bolus--> still orthostatic--> 2nd bolus


Lab results:


- Hb 13.2, Hct 40.1


- Na 133, GFR 19, glucose 165


- Troponin negative


Heart rate on admission 98


EKG with a. fib., rate 84


RVR will likely increase O2 needs- COVID positive


GFR baseline 26, 19 on admission


Not on any nephrotoxic meds 


Decreased oral intake


Admission /81


Hypertension home management with metoprolol


Diabetes home management with glimepiride, sitagliptin and insulin


Glucose on admission 165


Smoked 1.5 ppd for 51 years; Quit in 2005 


PLAN


- Start Remdesivir


- Start Dexamethasone


- PRN albuterol


- Continue Symbicort


- Hold metoprolol due to orthostatic hypotension


- Monitor for hypoxemia


- Monitor urine output


- Renally dosed medications


- Repeat labs and replace electrolytes as needed  


- Hold metoprolol


- PRN Hydralazine


- Continue home insulin at same dose for now


- No premeals, adjust with new measurements as needed


- New HbA1c


- Accu-checks before meals and bedtime


- Hypoglycemia protocol


- Hold PO meds


- Continue home omeprazole


- Continue home statin


- Dietary consult


- Continue home sertraline


Patient will be admitted to med surg floor, will be started on Remdesivir and 

dexamethasone. 





9/12/20


No longer on oxygen supplementation since 1:30 PM on 9/11/2020


Vital signs trend


Blood pressure: 117 264/50 3-91


T-max 98.2


Heart rate: 66-98


Pulse ox greater than 89%


Lab results


WBC down from 6.96-4.05


Hemoglobin down from 13.2-11.7


D-dimer stable from 0.72 2.71


Sodium down from 1 33-1 32


GFR up from 19-26


B12 87


BNP up from 6460-4450


CRP 5.2


Iron 26


Glucose -258


A1c 6.9%


PLAN


-Continue Remdesivir day 2 out of 5


-Continue dexamethasone day 2 out of 10


-Continue Symbicort


-PRN albuterol


-Repeat labs as needed


-Continue to hold metoprolol


-Monitor for hypoxemia


-Monitor urine output


-Renally dosed medications


-Repeat labs and replace electrolytes as needed  


-Hold metoprolol


-PRN Hydralazine


-Continue home insulin at same dose for now


-Accu-checks before meals and bedtime


-Hypoglycemia protocol


-Hold PO meds


-Continue home omeprazole


- Continue home statin


Patient will remain admitted for antiviral treatment for COVID.


Length of stay at least 5 days due to length of treatment required for COVID.





9/13/20


Shortness of breath has resolved


Has not required oxygen supplementation the past 24 hours


Vital signs trend


Blood pressure 376442/7289


T-max 98.4


Heart rate 66 to 88/min


Pulse ox greater than 97% on room air


New lab results


WBC up from 4.05-7.54


Hemoglobin up from 11.7-11.9


Platelets up from 193-235


D-dimer down from 0.71 2.5


Sodium stable from 1 


Chloride up from 94-96


GFR up from 26-30


Magnesium up from 1.9-2.1


CRP down from 5.2-2.8


Glucose trend 171-220





9/14/2020


Patient is asymptomatic.


Vital signs are stable and pulse ox is in the mid to upper 90s on room air.


CRP 2.0 and normal d-dimer.


Sodium up to 136.


BUN to creatinine ratio is still elevated at 30.0.


Estimated GFR is 30 and stable.  This is his chronic stable state.


Episode of blood sugar of 300 today.


Started sliding scale insulin.





9/15/2020


Patient is feeling well.


Vital signs stable with oxygenation in the mid upper 90s and afebrile.


Systolic blood pressure in the 160s today


Labs held today.


Blood sugars improved.


Awaiting placement.





9/16/2020


Patient is stable without any significant changes.


Blood pressure is ranging in the 150s up to 170s.  Afebrile.  Oxygen saturation 

on room air ranging from 90 to 100%, weight stable at approximately 66 kg


Hemoglobin and white count are stable at 12.0 and 7.9 respectively.


GFR 36 with creatinine of 1.8 which is improved.


BUN stable at 62.  Elevation likely secondary to protein catabolism from 

dexamethasone.


Blood sugars improved ranging from 120 up to 262


Still awaiting placement


Patient has not been getting his ethacrynic acid because it is nonformulary.





9/17/2020


Blood pressure continues to be elevated.  He was started on low-dose Lasix this 

morning.  He would likely benefit from additional treatment.


Improvement in glucose. But continues to be elevated.  This should resolve once 

he is off of dexamethasone.


No significant change overnight.





9/18/2020


Blood pressure is improved with the addition of amlodipine.


Patient has no complaints.


Glucose continues to be elevated secondary to dexamethasone





9/19/2020


Blood pressure is improved but continues to be elevated.


Blood pressure ranges from 122//56


Fingerstick blood sugars also continue to be elevated.


Still off oxygen and generally doing either better or stable on all parameters.





9/20/2020


Blood pressure improved with the last few systolic blood pressures between 149 

and 155


Blood sugars continue to be elevated likely secondary to dexamethasone


Estimated GFR 34.


BUN and creatinine both stable at 61 and 1.9, respectively





09/21/2020


Blood pressures 148/45.


Dexamethasone discontinued yesterday. 


Blood sugars this morning 82.  Insulin held.  





09/22/2020


Blood pressures 139/67


Blood sugars 107 and 145 today.


Tinea to groin and buttocks.  Lotrimin topically started. 


WBC 16.7 likely due to IV decadron.  No bandemia or signs of symptoms of an 

infective process.


BUN 52


CRE 2.0


GFR 32


Nothing acute appreciated on chest xray.





09/24/20


Pt stable without any changes.  


Decreased cough noted


Awaiting placement to [place] on Monday


Isolation precautions removed.


Vital signs trend


-Blood pressure:  105-148/58-90


-T-max:  98.2


-Heart rate:  71-95


-Pulse ox 94-98% on room air


Lab Results


no labs drawn today


PLAN


-awaiting nursing home placement


-Repeat labs as needed


-PT and OT to continue to treat patient








09/25/20


Pt stable without any changes.  


Awaiting placement to [place] on 09/28/20


Vital signs trend


-Blood pressure:  125-147/80-99


-T-max:  98.1


-Heart rate:  


-Pulse ox 96-99% on room air


Lab Results


no labs drawn today


PLAN


-awaiting nursing home placement


-Repeat labs as needed


-PT and OT to continue to treat patient





09/26/20


Patient has remained stable


Nursing is reporting patient is less interactive and appears more confused


Vital signs trend


-Blood pressure: 597795/6289


-T-max 99.1 at 9 PM yesterday


-Heart rate 75-98


-Pulse ox greater than 91% on room air


Lab results


-WBC up from 15.4-18.28, no changes in differential


-GFR stable at 32


PLAN


-SNF placement


-Let me sleep protocol


-Start Seroquel low-dose tonight


-Continue Symbicort


-PRN albuterol


-Repeat labs as needed


-Monitor for hypoxemia


-Monitor urine output


-Renally dosed medications


-Supplementation for B12


-PRN Hydralazine


-Continue home insulin 


-Accu-checks before meals and bedtime


-Hypoglycemia protocol


-Hold PO meds


-Continue home omeprazole, statin, sertaline, metoprolol and amlodipine. 





9/27/2020


Nursing reports patient reports has been drinking minimal amount of amount of 

water


Vital signs trend


-Blood pressure 117-142/61-82


-T-max 99.1


-Heart rate 


-Pulse ox greater than 87% on room air


Lab results


WBC up from 18.28-18.64


Hemoglobin down from 13.4-12.9


Sodium up from 134-135


GFR down from 32-30


Glucose POC: 166-296





- Plan


Plan:: 








Acute hypoactive delirium secondary to extended hospital stay, worsening


Acute kidney injury, recurred


Hyponatremia


-CT head


-CTA of the chest


-Start LR at 100 mL's per hour x1 bag


-Rule out infectious source


-FeNa


-Let me sleep protocol


-Start Seroquel low-dose tonight





Acute deconditioning


-SNF placement





COPD (chronic obstructive pulmonary disease), stable


-Continue Symbicort


-PRN albuterol





Atrial fibrillation with controlled ventricular rate, stable


-Continue metoprolol and Xarelto


 


Chronic kidney disease stable


Iron deficiency anemia, stable


B12 deficiency


-Monitor urine output


-Renally dosed medications


-Supplementation for B12





Hypertension, stable


-Continue metoprolol and amlodipine


-PRN Hydralazine





Diabetes mellitus, [id number]-2.9%


-Continue home insulin 


-Accu-checks before meals and bedtime


-Hypoglycemia protocol


-Hold PO meds





Gastroesophageal reflux disease


-Continue home omeprazole





Dyslipidemia


- Continue home statin





Depression


-Continue home sertraline





COVID-19, treated


Orthostatic, resolved





PROPHYLAXIS


DVT- home Xarelto


GI- home omeprazole





CODE STATUS: FULL CODE





DISPOSITION:


Patient admitted for COVID-19 infection and completed treatment as well as 

isolation time, with significant weakness during his hospital stay for which 

physical therapy is recommending SNF placement.


Awaiting placement however patient had a decline in his mental status for the 

past 2 days which we are working up at this time, discharge is planned for 

tomorrow awaiting results of work up and response to interventions.

## 2020-09-28 NOTE — CR
Chest: AP and lateral views of the chest are obtained.

 

Comparison: Prior chest CT of 09/27/20.

 

Findings: Increased density within the right upper chest is seen.  

This correlates to the area of parenchymal density on chest CT.  These

 findings are an interval change from prior chest x-ray of 09/22/20.  

 

Slight increasing density within the left upper chest is seen.  This 

is an interval change from prior studies.  

 

Heart is mildly enlarged.  Pulmonary vessels may be slightly 

increased.  Mild degenerative change noted within the spine.  Mild 

anterior wedge deformity noted at the thoracolumbar junction which 

appears chronic.

 

Impression:

1.  Increased parenchymal density within right upper chest similar to 

recent chest CT.  This is an interval change from prior chest x-ray as

 noted above.

2.  Mild increased density within the left midlung which is an 

interval change from both prior studies.

3.  Findings suspicious for mild pulmonary vascular congestion.

4.  Chronic bone findings as noted above.

 

Diagnostic code #3

 

This report was dictated in MDT

## 2020-09-28 NOTE — PCM.PN
- General Info


Date of Service: 09/28/20


Admission Dx/Problem (Free Text): 


                           Admission Diagnosis/Problem





Admission Diagnosis/Problem      Hypoxia








Subjective Update: 





Pt much worse today.  Very lethargic.  Fever noted.  Not taking po.  Discharge 

to [facility] on hold.  UTI.  


Functional Status: Denies: Tolerating Diet, Urinating (incontinent)





- Review of Systems


General: Reports: Fever, Weakness.  Denies: Appetite (poor po intake)


HEENT: Reports: Other (epistaxis to bilateral nares yesterday-rhino rockets in 

place)


Pulmonary: Reports: Cough (weak congested).  Denies: Shortness of Breath, Sputum


Cardiovascular: Reports: No Symptoms


Gastrointestinal: Reports: Decreased Appetite.  Denies: Abdominal Pain, 

Constipation, Diarrhea, Nausea, Vomiting


Genitourinary: Reports: Incontinence


Musculoskeletal: Reports: No Symptoms


Skin: Reports: No Symptoms


Neurological: Reports: Confusion, Weakness


Psychiatric: Reports: No Symptoms





- Patient Data


Weight - Most Recent: 138 lb 3.2 oz





- Exam


Quality Assessment: DVT Prophylaxis (xarelto and ASA stopped due to epistaxis-

NAGA stockings applied).  No: Supplemental Oxygen


General: Cooperative, Mild Distress, Lethargic


HEENT: Pupils Equal.  No: Mucous Membr. Moist/Pink (dry)


Neck: Supple, Trachea Midline.  No: Lymphadenopathy


Lungs: Decreased Breath Sounds, Crackles (left base)


Cardiovascular: Regular Rate, Regular Rhythm, No Murmurs


GI/Abdominal Exam: Normal Bowel Sounds, Soft, Non-Tender


 (Male) Exam: Deferred


Back Exam: Normal Inspection, Full Range of Motion


Extremities: Normal Inspection, Normal Range of Motion, Non-Tender, No Pedal 

Edema, Normal Capillary Refill


Peripheral Pulses: 2+: Radial (L), Radial (R), Dorsalis Pedis (L), Dorsalis 

Pedis (R)


Skin: Warm, Dry, Intact, Other (skin is hot)


Neurological: No New Focal Deficit


Psy/Mental Status: Other (very lethargic)





Sepsis Event Note





- Evaluation


Sepsis Screening Result: Severe Sepsis Risk





- Problem List & Annotations


(1) Acute on chronic renal failure


SNOMED Code(s): 228499664


   Code(s): N17.9 - ACUTE KIDNEY FAILURE, UNSPECIFIED; N18.9 - CHRONIC KIDNEY 

DISEASE, UNSPECIFIED   Status: Acute   Priority: High   Current Visit: Yes   


Qualifiers: 


   Acute renal failure type: unspecified   Chronic kidney disease stage: 

unspecified stage   Qualified Code(s): N17.9 - Acute kidney failure, 

unspecified; N18.9 - Chronic kidney disease, unspecified   





(2) Atrial fibrillation with controlled ventricular rate


SNOMED Code(s): 39129406


   Code(s): I48.91 - UNSPECIFIED ATRIAL FIBRILLATION   Status: Chronic   

Priority: High   Current Visit: Yes   





(3) B12 deficiency


SNOMED Code(s): 843640697


   Code(s): E53.8 - DEFICIENCY OF OTHER SPECIFIED B GROUP VITAMINS   Status: 

Chronic   Priority: Medium   Current Visit: Yes   





(4) COVID-19


SNOMED Code(s): 432303355


   Code(s): U07.1 - COVID-19   Status: Acute   Priority: High   Current Visit: 

Yes   





(5) Depression


SNOMED Code(s): 35069986


   Code(s): F32.9 - MAJOR DEPRESSIVE DISORDER, SINGLE EPISODE, UNSPECIFIED   

Status: Chronic   Priority: Medium   Current Visit: Yes   


Qualifiers: 


   Depression Type: unspecified   Qualified Code(s): F32.9 - Major depressive 

disorder, single episode, unspecified   





(6) Dyslipidemia


SNOMED Code(s): 996120795


   Code(s): E78.5 - HYPERLIPIDEMIA, UNSPECIFIED   Status: Chronic   Priority: 

Medium   Current Visit: Yes   





(7) Hypoalbuminemia


SNOMED Code(s): 012138667


   Code(s): E88.09 - OTH DISORDERS OF PLASMA-PROTEIN METABOLISM, NEC   Status: 

Acute   Priority: High   Current Visit: Yes   





(8) Hyponatremia


SNOMED Code(s): 27365736


   Code(s): E87.1 - HYPO-OSMOLALITY AND HYPONATREMIA   Status: Resolved   

Priority: Low   Current Visit: Yes   





(9) Iron deficiency


SNOMED Code(s): 10876127


   Code(s): E61.1 - IRON DEFICIENCY   Status: Acute   Priority: High   Current 

Visit: Yes   





(10) Orthostasis


SNOMED Code(s): 91152037


   Code(s): I95.1 - ORTHOSTATIC HYPOTENSION   Status: Resolved   Priority: High 

 Current Visit: Yes   





(11) Diabetes


SNOMED Code(s): 04617188


   Code(s): E11.9 - TYPE 2 DIABETES MELLITUS WITHOUT COMPLICATIONS   Status: 

Chronic   Priority: High   Current Visit: Yes   


Qualifiers: 


   Diabetes mellitus type: type 2   Diabetes mellitus long term insulin use: 

unspecified long term insulin use status   Diabetes mellitus complication 

status: without complication   Qualified Code(s): E11.9 - Type 2 diabetes 

mellitus without complications   





(12) GERD (gastroesophageal reflux disease)


SNOMED Code(s): 111749463


   Code(s): K21.9 - GASTRO-ESOPHAGEAL REFLUX DISEASE WITHOUT ESOPHAGITIS   

Status: Chronic   Priority: Medium   Current Visit: No   


Qualifiers: 


   Esophagitis presence: without esophagitis   Qualified Code(s): K21.9 - 

Gastro-esophageal reflux disease without esophagitis   





(13) Hypertension


SNOMED Code(s): 89436333


   Code(s): I10 - ESSENTIAL (PRIMARY) HYPERTENSION   Status: Chronic   Priority:

High   Current Visit: No   


Qualifiers: 


   Hypertension type: unspecified   Qualified Code(s): I10 - Essential (primary)

hypertension   





(14) COPD (chronic obstructive pulmonary disease)


SNOMED Code(s): 87327393


   Code(s): J44.9 - CHRONIC OBSTRUCTIVE PULMONARY DISEASE, UNSPECIFIED   Status:

Chronic   Priority: High   Current Visit: No   


Qualifiers: 


   COPD type: unspecified COPD   Qualified Code(s): J44.9 - Chronic obstructive 

pulmonary disease, unspecified   





(15) UTI (urinary tract infection)


SNOMED Code(s): 62645351


   Code(s): N39.0 - URINARY TRACT INFECTION, SITE NOT SPECIFIED   Status: Acute 

 Priority: High   Current Visit: Yes   


Qualifiers: 


   Urinary tract infection type: acute cystitis   Hematuria presence: with 

hematuria   Qualified Code(s): N30.01 - Acute cystitis with hematuria   





- Problem List Review


Problem List Initiated/Reviewed/Updated: Yes





- My Orders


Last 24 Hours: 


My Active Orders





09/28/20 09:30


cefTRIAXone [Rocephin] 2 gm   Sodium Chloride 0.9% [Normal Saline] 100 ml IV 

Q24H 





09/28/20 11:30


Lactated Ringers [Ringers, Lactated] 1,000 ml IV ASDIRECTED 





09/28/20 12:18


Blood Culture x2 Reflex Set [OM.PC] Stat 





09/28/20 13:09


CULTURE BLOOD [BC] Stat 





09/28/20 13:24


CULTURE BLOOD [BC] Stat 





09/28/20 14:00


Sodium Phosphate 30 mmole   Sodium Chloride 0.9% [Normal Saline] 250 ml IV 

ONETIME 





09/28/20 21:00


Insulin Glarg,Human.Rec.Analog [LantUS]   15 unit SUBCUT BEDTIME 














- Assessment


Assessment:: 





9/11/20


1 day of weakness and low O2 sats


Lives in [place], + COVID patients in past week


Pulse ox on admission 89%


CXR with cardiomegaly only, no lung parenchyma changes


Orthostatic --> given 500ml IVF bolus--> still orthostatic--> 2nd bolus


Lab results:


- Hb 13.2, Hct 40.1


- Na 133, GFR 19, glucose 165


- Troponin negative


Heart rate on admission 98


EKG with a. fib., rate 84


RVR will likely increase O2 needs- COVID positive


GFR baseline 26, 19 on admission


Not on any nephrotoxic meds 


Decreased oral intake


Admission /81


Hypertension home management with metoprolol


Diabetes home management with glimepiride, sitagliptin and insulin


Glucose on admission 165


Smoked 1.5 ppd for 51 years; Quit in 2005 


PLAN


- Start Remdesivir


- Start Dexamethasone


- PRN albuterol


- Continue Symbicort


- Hold metoprolol due to orthostatic hypotension


- Monitor for hypoxemia


- Monitor urine output


- Renally dosed medications


- Repeat labs and replace electrolytes as needed  


- Hold metoprolol


- PRN Hydralazine


- Continue home insulin at same dose for now


- No premeals, adjust with new measurements as needed


- New HbA1c


- Accu-checks before meals and bedtime


- Hypoglycemia protocol


- Hold PO meds


- Continue home omeprazole


- Continue home statin


- Dietary consult


- Continue home sertraline


Patient will be admitted to med surg floor, will be started on Remdesivir and 

dexamethasone. 





9/12/20


No longer on oxygen supplementation since 1:30 PM on 9/11/2020


Vital signs trend


Blood pressure: 117 264/50 3-91


T-max 98.2


Heart rate: 66-98


Pulse ox greater than 89%


Lab results


WBC down from 6.96-4.05


Hemoglobin down from 13.2-11.7


D-dimer stable from 0.72 2.71


Sodium down from 1 33-1 32


GFR up from 19-26


B12 87


BNP up from 5822-7267


CRP 5.2


Iron 26


Glucose -258


A1c 6.9%


PLAN


-Continue Remdesivir day 2 out of 5


-Continue dexamethasone day 2 out of 10


-Continue Symbicort


-PRN albuterol


-Repeat labs as needed


-Continue to hold metoprolol


-Monitor for hypoxemia


-Monitor urine output


-Renally dosed medications


-Repeat labs and replace electrolytes as needed  


-Hold metoprolol


-PRN Hydralazine


-Continue home insulin at same dose for now


-Accu-checks before meals and bedtime


-Hypoglycemia protocol


-Hold PO meds


-Continue home omeprazole


- Continue home statin


Patient will remain admitted for antiviral treatment for COVID.


Length of stay at least 5 days due to length of treatment required for COVID.





9/13/20


Shortness of breath has resolved


Has not required oxygen supplementation the past 24 hours


Vital signs trend


Blood pressure 642599/7289


T-max 98.4


Heart rate 66 to 88/min


Pulse ox greater than 97% on room air


New lab results


WBC up from 4.05-7.54


Hemoglobin up from 11.7-11.9


Platelets up from 193-235


D-dimer down from 0.71 2.5


Sodium stable from 1 


Chloride up from 94-96


GFR up from 26-30


Magnesium up from 1.9-2.1


CRP down from 5.2-2.8


Glucose trend 171-220





9/14/2020


Patient is asymptomatic.


Vital signs are stable and pulse ox is in the mid to upper 90s on room air.


CRP 2.0 and normal d-dimer.


Sodium up to 136.


BUN to creatinine ratio is still elevated at 30.0.


Estimated GFR is 30 and stable.  This is his chronic stable state.


Episode of blood sugar of 300 today.


Started sliding scale insulin.





9/15/2020


Patient is feeling well.


Vital signs stable with oxygenation in the mid upper 90s and afebrile.


Systolic blood pressure in the 160s today


Labs held today.


Blood sugars improved.


Awaiting placement.





9/16/2020


Patient is stable without any significant changes.


Blood pressure is ranging in the 150s up to 170s.  Afebrile.  Oxygen saturation 

on room air ranging from 90 to 100%, weight stable at approximately 66 kg


Hemoglobin and white count are stable at 12.0 and 7.9 respectively.


GFR 36 with creatinine of 1.8 which is improved.


BUN stable at 62.  Elevation likely secondary to protein catabolism from 

dexamethasone.


Blood sugars improved ranging from 120 up to 262


Still awaiting placement


Patient has not been getting his ethacrynic acid because it is nonformulary.





9/17/2020


Blood pressure continues to be elevated.  He was started on low-dose Lasix this 

morning.  He would likely benefit from additional treatment.


Improvement in glucose. But continues to be elevated.  This should resolve once 

he is off of dexamethasone.


No significant change overnight.





9/18/2020


Blood pressure is improved with the addition of amlodipine.


Patient has no complaints.


Glucose continues to be elevated secondary to dexamethasone





9/19/2020


Blood pressure is improved but continues to be elevated.


Blood pressure ranges from 122//56


Fingerstick blood sugars also continue to be elevated.


Still off oxygen and generally doing either better or stable on all parameters.





9/20/2020


Blood pressure improved with the last few systolic blood pressures between 149 

and 155


Blood sugars continue to be elevated likely secondary to dexamethasone


Estimated GFR 34.


BUN and creatinine both stable at 61 and 1.9, respectively





09/21/2020


Blood pressures 148/45.


Dexamethasone discontinued yesterday. 


Blood sugars this morning 82.  Insulin held.  





09/22/2020


Blood pressures 139/67


Blood sugars 107 and 145 today.


Tinea to groin and buttocks.  Lotrimin topically started. 


WBC 16.7 likely due to IV decadron.  No bandemia or signs of symptoms of an 

infective process.


BUN 52


CRE 2.0


GFR 32


Nothing acute appreciated on chest xray.





09/24/20


Pt stable without any changes.  


Decreased cough noted


Awaiting placement to [place] on Monday


Isolation precautions removed.


Vital signs trend


-Blood pressure:  105-148/58-90


-T-max:  98.2


-Heart rate:  71-95


-Pulse ox 94-98% on room air


Lab Results


no labs drawn today


PLAN


-awaiting nursing home placement


-Repeat labs as needed


-PT and OT to continue to treat patient








09/25/20


Pt stable without any changes.  


Awaiting placement to [place] on 09/28/20


Vital signs trend


-Blood pressure:  125-147/80-99


-T-max:  98.1


-Heart rate:  


-Pulse ox 96-99% on room air


Lab Results


no labs drawn today


PLAN


-awaiting nursing home placement


-Repeat labs as needed


-PT and OT to continue to treat patient





09/26/20


Patient has remained stable


Nursing is reporting patient is less interactive and appears more confused


Vital signs trend


-Blood pressure: 887830/6289


-T-max 99.1 at 9 PM yesterday


-Heart rate 75-98


-Pulse ox greater than 91% on room air


Lab results


-WBC up from 15.4-18.28, no changes in differential


-GFR stable at 32


PLAN


-SNF placement


-Let me sleep protocol


-Start Seroquel low-dose tonight


-Continue Symbicort


-PRN albuterol


-Repeat labs as needed


-Monitor for hypoxemia


-Monitor urine output


-Renally dosed medications


-Supplementation for B12


-PRN Hydralazine


-Continue home insulin 


-Accu-checks before meals and bedtime


-Hypoglycemia protocol


-Hold PO meds


-Continue home omeprazole, statin, sertaline, metoprolol and amlodipine. 





9/27/2020


Nursing reports patient reports has been drinking minimal amount of amount of 

water


Vital signs trend


-Blood pressure 117-142/61-82


-T-max 99.1


-Heart rate 


-Pulse ox greater than 87% on room air


Lab results


WBC up from 18.28-18.64


Hemoglobin down from 13.4-12.9


Sodium up from 134-135


GFR down from 32-30


Glucose POC: 166-296





9/28/2020


-Doing much worse today


-Lethargic


-Positive for UTI, cultures pending


-Started on Rocephin


-Temp is 99.0.  Usual temp is 97.0.  Blood cultures ordered


-Taking po poorly.  


-Epistaxis to bilateral nares yesterday with rhinorockets placed.  Rhinorockets 

removed today.  No further epistaxis noted.


-Xarelto and ASA discontinued. TEDS ordered


Vital signs trend


-Blood pressure 110-147/74-84


-T-max 99.0


-Heart rate 


-Pulse ox 91-94% on room air


Lab results


WBC down from 20.62 to 17.75


Hemoglobin down from 12.9-12.3


Sodium up from 133-138


GFR up from 29-32


Glucose POC: 166-296


-Phos 2.4


-Mg 1.8


ABG's on room air


-pH 7.54


-pCO2 26.4


-pO2 61.0


-HCO3 22.5


UA


3+ blood


3+ nitrite


3+ leukocyte esterase


Urine WBC 20-30


Moderate urine mucous














- Plan


Plan:: 








Acute hypoactive delirium secondary to extended hospital stay, worsening


Acute kidney injury, recurred


Hyponatremia


-Start Normal Saline at 100ml/hr


-Encourage po intake





Acute deconditioning


-SNF placement





COPD (chronic obstructive pulmonary disease), stable


-Continue Symbicort


-PRN albuterol





Atrial fibrillation with controlled ventricular rate, stable


-Continue metoprolol


 


Chronic kidney disease stable


Iron deficiency anemia, stable


B12 deficiency


-Monitor urine output


-Renally dosed medications


-Supplementation for B12





Hypertension, stable


-Continue metoprolol and amlodipine


-PRN Hydralazine





Diabetes mellitus, ExI9a-8.9%


-Continue home insulin 


-Accu-checks before meals and bedtime


-Hypoglycemia protocol


-Hold PO meds





Gastroesophageal reflux disease


-Continue home omeprazole





Dyslipidemia


- Continue home statin





Depression


-Continue home sertraline





COVID-19, treated


Orthostatic, resolved





 UTI (urinary tract infection)


-Culture urine


-Rocephin IV


-Blood cultures





PROPHYLAXIS


DVT- NAGA stockings


GI- home omeprazole





CODE STATUS: FULL CODE





DISPOSITION:


Patient admitted for COVID-19 infection and completed treatment as well as 

isolation time, with significant weakness during his hospital stay for which 

physical therapy is recommending SNF placement.


Awaiting placement however patient had a decline in his mental status for the 

past 2 days which we are working up at this time, acute epistaxis, and UTI.  UTI

 requring IV Rocephin.

## 2020-09-29 NOTE — PCM.PN
- General Info


Date of Service: 09/29/20


Admission Dx/Problem (Free Text): 


                           Admission Diagnosis/Problem





Admission Diagnosis/Problem      Hypoxia








Subjective Update: 





More alert today, but still groggy.  Up to the chair for breakfast.  POA was 

updated yesterday on the patient's status.  


Functional Status: Reports: Urinating.  Denies: Tolerating Diet (NPO per speech 

therapy), Incentive Spirometry (not able to follow commands to do IS)





- Review of Systems


General: Reports: Weakness.  Denies: Appetite (NPO)


HEENT: Reports: Dysphasia


Pulmonary: Reports: Cough.  Denies: Sputum


Cardiovascular: Reports: No Symptoms


Gastrointestinal: Reports: No Symptoms


Genitourinary: Reports: Incontinence


Musculoskeletal: Reports: No Symptoms


Skin: Reports: No Symptoms


Neurological: Reports: Confusion


Psychiatric: Reports: Confusion





- Patient Data


Weight - Most Recent: 138 lb 1.6 oz





- Exam


Quality Assessment: DVT Prophylaxis (TEDS; xarelto restarted)


General: Alert, Cooperative, No Acute Distress.  No: Oriented


HEENT: Pupils Equal, Mucous Membr. Moist/Pink


Neck: Supple, Trachea Midline, No JVD.  No: Lymphadenopathy


Lungs: Normal Respiratory Effort, Decreased Breath Sounds, Crackles (bases)


Cardiovascular: Irregular Rhythm


GI/Abdominal Exam: Normal Bowel Sounds, Soft, Non-Tender, No Distention


 (Male) Exam: Deferred


Back Exam: Normal Inspection, Full Range of Motion


Extremities: Normal Inspection, Normal Range of Motion, Non-Tender, No Pedal 

Edema, Normal Capillary Refill


Peripheral Pulses: 2+: Radial (L), Radial (R), Dorsalis Pedis (L), Dorsalis 

Pedis (R)


Skin: Warm, Dry, Intact


Neurological: No New Focal Deficit


Psy/Mental Status: Alert, Normal Affect, Normal Mood





Sepsis Event Note





- Evaluation


Sepsis Screening Result: No Definite Risk





- Focused Exam


Vital Signs: 


                                   Vital Signs











  Temp Pulse Resp BP Pulse Ox Pulse Ox


 


 09/29/20 08:47     146/57 H  


 


 09/29/20 08:46   86   146/57 H  


 


 09/29/20 08:07  98.2 F     


 


 09/29/20 08:00   86  28 H  146/57 H  91 L 


 


 09/29/20 07:59       91 L


 


 09/29/20 03:26  97.3 F  79  18  147/59 H  94 L 














- Problem List & Annotations


(1) Acute on chronic renal failure


SNOMED Code(s): 521327659


   Code(s): N17.9 - ACUTE KIDNEY FAILURE, UNSPECIFIED; N18.9 - CHRONIC KIDNEY 

DISEASE, UNSPECIFIED   Status: Acute   Priority: High   Current Visit: Yes   


Qualifiers: 


   Acute renal failure type: unspecified   Chronic kidney disease stage: 

unspecified stage   Qualified Code(s): N17.9 - Acute kidney failure, 

unspecified; N18.9 - Chronic kidney disease, unspecified   





(2) Atrial fibrillation with controlled ventricular rate


SNOMED Code(s): 81410905


   Code(s): I48.91 - UNSPECIFIED ATRIAL FIBRILLATION   Status: Chronic   

Priority: High   Current Visit: Yes   





(3) B12 deficiency


SNOMED Code(s): 531367564


   Code(s): E53.8 - DEFICIENCY OF OTHER SPECIFIED B GROUP VITAMINS   Status: 

Chronic   Priority: Medium   Current Visit: Yes   





(4) COVID-19


SNOMED Code(s): 177577622


   Code(s): U07.1 - COVID-19   Status: Acute   Priority: High   Current Visit: 

Yes   





(5) Depression


SNOMED Code(s): 44097363


   Code(s): F32.9 - MAJOR DEPRESSIVE DISORDER, SINGLE EPISODE, UNSPECIFIED   

Status: Chronic   Priority: Medium   Current Visit: Yes   


Qualifiers: 


   Depression Type: unspecified   Qualified Code(s): F32.9 - Major depressive 

disorder, single episode, unspecified   





(6) Dyslipidemia


SNOMED Code(s): 709778154


   Code(s): E78.5 - HYPERLIPIDEMIA, UNSPECIFIED   Status: Chronic   Priority: 

Medium   Current Visit: Yes   





(7) Hypoalbuminemia


SNOMED Code(s): 041234945


   Code(s): E88.09 - OTH DISORDERS OF PLASMA-PROTEIN METABOLISM, NEC   Status: 

Acute   Priority: High   Current Visit: Yes   





(8) Hyponatremia


SNOMED Code(s): 23061561


   Code(s): E87.1 - HYPO-OSMOLALITY AND HYPONATREMIA   Status: Resolved   

Priority: Low   Current Visit: Yes   





(9) Iron deficiency


SNOMED Code(s): 67997800


   Code(s): E61.1 - IRON DEFICIENCY   Status: Acute   Priority: High   Current 

Visit: Yes   





(10) Orthostasis


SNOMED Code(s): 81641943


   Code(s): I95.1 - ORTHOSTATIC HYPOTENSION   Status: Resolved   Priority: High 

 Current Visit: Yes   





(11) Diabetes


SNOMED Code(s): 28610509


   Code(s): E11.9 - TYPE 2 DIABETES MELLITUS WITHOUT COMPLICATIONS   Status: 

Chronic   Priority: High   Current Visit: Yes   


Qualifiers: 


   Diabetes mellitus type: type 2   Diabetes mellitus long term insulin use: 

unspecified long term insulin use status   Diabetes mellitus complication 

status: without complication   Qualified Code(s): E11.9 - Type 2 diabetes 

mellitus without complications   





(12) GERD (gastroesophageal reflux disease)


SNOMED Code(s): 590240962


   Code(s): K21.9 - GASTRO-ESOPHAGEAL REFLUX DISEASE WITHOUT ESOPHAGITIS   

Status: Chronic   Priority: Medium   Current Visit: No   


Qualifiers: 


   Esophagitis presence: without esophagitis   Qualified Code(s): K21.9 - 

Gastro-esophageal reflux disease without esophagitis   





(13) Hypertension


SNOMED Code(s): 84191217


   Code(s): I10 - ESSENTIAL (PRIMARY) HYPERTENSION   Status: Chronic   Priority:

High   Current Visit: No   


Qualifiers: 


   Hypertension type: unspecified   Qualified Code(s): I10 - Essential (primary)

hypertension   





(14) COPD (chronic obstructive pulmonary disease)


SNOMED Code(s): 97774627


   Code(s): J44.9 - CHRONIC OBSTRUCTIVE PULMONARY DISEASE, UNSPECIFIED   Status:

Chronic   Priority: High   Current Visit: No   


Qualifiers: 


   COPD type: unspecified COPD   Qualified Code(s): J44.9 - Chronic obstructive 

pulmonary disease, unspecified   





(15) UTI (urinary tract infection)


SNOMED Code(s): 39949423


   Code(s): N39.0 - URINARY TRACT INFECTION, SITE NOT SPECIFIED   Status: Acute 

 Priority: High   Current Visit: Yes   


Qualifiers: 


   Urinary tract infection type: acute cystitis   Hematuria presence: with 

hematuria   Qualified Code(s): N30.01 - Acute cystitis with hematuria   





- Problem List Review


Problem List Initiated/Reviewed/Updated: Yes





- My Orders


Last 24 Hours: 


My Active Orders





09/28/20 12:18


Blood Culture x2 Reflex Set [OM.PC] Stat 





09/28/20 13:09


CULTURE BLOOD [BC] Stat 





09/28/20 13:24


CULTURE BLOOD [BC] Stat 





09/28/20 14:13


Antiembolic Devices [RC] PER UNIT ROUTINE 


NAGA Hose [Antiembolic Hose] [OM.PC] Routine 





09/28/20 16:55


Blood Culture x2 Reflex Set [OM.PC] Stat 





09/28/20 17:12


CULTURE BLOOD [BC] Stat 





09/28/20 17:25


CULTURE BLOOD [BC] Stat 





09/28/20 21:00


Insulin Glarg,Human.Rec.Analog [LantUS]   15 unit SUBCUT BEDTIME 





09/29/20 10:30


Rivaroxaban [Xarelto]   10 mg PO DAILY 





09/29/20 12:30


Potassium Chloride [KCl 10 MEQ in Water 100 ML] 10 meq   Premix Bag 1 bag IV Q1H







09/29/20 16:00


cefTRIAXone [Rocephin] 2 gm   Sodium Chloride 0.9% [Normal Saline] 100 ml IV 

Q24H 














- Assessment


Assessment:: 





9/11/20


1 day of weakness and low O2 sats


Lives in [place], + COVID patients in past week


Pulse ox on admission 89%


CXR with cardiomegaly only, no lung parenchyma changes


Orthostatic --> given 500ml IVF bolus--> still orthostatic--> 2nd bolus


Lab results:


- Hb 13.2, Hct 40.1


- Na 133, GFR 19, glucose 165


- Troponin negative


Heart rate on admission 98


EKG with a. fib., rate 84


RVR will likely increase O2 needs- COVID positive


GFR baseline 26, 19 on admission


Not on any nephrotoxic meds 


Decreased oral intake


Admission /81


Hypertension home management with metoprolol


Diabetes home management with glimepiride, sitagliptin and insulin


Glucose on admission 165


Smoked 1.5 ppd for 51 years; Quit in 2005 


PLAN


- Start Remdesivir


- Start Dexamethasone


- PRN albuterol


- Continue Symbicort


- Hold metoprolol due to orthostatic hypotension


- Monitor for hypoxemia


- Monitor urine output


- Renally dosed medications


- Repeat labs and replace electrolytes as needed  


- Hold metoprolol


- PRN Hydralazine


- Continue home insulin at same dose for now


- No premeals, adjust with new measurements as needed


- New HbA1c


- Accu-checks before meals and bedtime


- Hypoglycemia protocol


- Hold PO meds


- Continue home omeprazole


- Continue home statin


- Dietary consult


- Continue home sertraline


Patient will be admitted to med surg floor, will be started on Remdesivir and 

dexamethasone. 





9/12/20


No longer on oxygen supplementation since 1:30 PM on 9/11/2020


Vital signs trend


Blood pressure: 117 264/50 3-91


T-max 98.2


Heart rate: 66-98


Pulse ox greater than 89%


Lab results


WBC down from 6.96-4.05


Hemoglobin down from 13.2-11.7


D-dimer stable from 0.72 2.71


Sodium down from 1 33-1 32


GFR up from 19-26


B12 87


BNP up from 8217-3976


CRP 5.2


Iron 26


Glucose -258


A1c 6.9%


PLAN


-Continue Remdesivir day 2 out of 5


-Continue dexamethasone day 2 out of 10


-Continue Symbicort


-PRN albuterol


-Repeat labs as needed


-Continue to hold metoprolol


-Monitor for hypoxemia


-Monitor urine output


-Renally dosed medications


-Repeat labs and replace electrolytes as needed  


-Hold metoprolol


-PRN Hydralazine


-Continue home insulin at same dose for now


-Accu-checks before meals and bedtime


-Hypoglycemia protocol


-Hold PO meds


-Continue home omeprazole


- Continue home statin


Patient will remain admitted for antiviral treatment for COVID.


Length of stay at least 5 days due to length of treatment required for COVID.





9/13/20


Shortness of breath has resolved


Has not required oxygen supplementation the past 24 hours


Vital signs trend


Blood pressure 768575/7289


T-max 98.4


Heart rate 66 to 88/min


Pulse ox greater than 97% on room air


New lab results


WBC up from 4.05-7.54


Hemoglobin up from 11.7-11.9


Platelets up from 193-235


D-dimer down from 0.71 2.5


Sodium stable from 1 


Chloride up from 94-96


GFR up from 26-30


Magnesium up from 1.9-2.1


CRP down from 5.2-2.8


Glucose trend 171-220





9/14/2020


Patient is asymptomatic.


Vital signs are stable and pulse ox is in the mid to upper 90s on room air.


CRP 2.0 and normal d-dimer.


Sodium up to 136.


BUN to creatinine ratio is still elevated at 30.0.


Estimated GFR is 30 and stable.  This is his chronic stable state.


Episode of blood sugar of 300 today.


Started sliding scale insulin.





9/15/2020


Patient is feeling well.


Vital signs stable with oxygenation in the mid upper 90s and afebrile.


Systolic blood pressure in the 160s today


Labs held today.


Blood sugars improved.


Awaiting placement.





9/16/2020


Patient is stable without any significant changes.


Blood pressure is ranging in the 150s up to 170s.  Afebrile.  Oxygen saturation 

on room air ranging from 90 to 100%, weight stable at approximately 66 kg


Hemoglobin and white count are stable at 12.0 and 7.9 respectively.


GFR 36 with creatinine of 1.8 which is improved.


BUN stable at 62.  Elevation likely secondary to protein catabolism from 

dexamethasone.


Blood sugars improved ranging from 120 up to 262


Still awaiting placement


Patient has not been getting his ethacrynic acid because it is nonformulary.





9/17/2020


Blood pressure continues to be elevated.  He was started on low-dose Lasix this 

morning.  He would likely benefit from additional treatment.


Improvement in glucose. But continues to be elevated.  This should resolve once 

he is off of dexamethasone.


No significant change overnight.





9/18/2020


Blood pressure is improved with the addition of amlodipine.


Patient has no complaints.


Glucose continues to be elevated secondary to dexamethasone





9/19/2020


Blood pressure is improved but continues to be elevated.


Blood pressure ranges from 122//56


Fingerstick blood sugars also continue to be elevated.


Still off oxygen and generally doing either better or stable on all parameters.





9/20/2020


Blood pressure improved with the last few systolic blood pressures between 149 

and 155


Blood sugars continue to be elevated likely secondary to dexamethasone


Estimated GFR 34.


BUN and creatinine both stable at 61 and 1.9, respectively





09/21/2020


Blood pressures 148/45.


Dexamethasone discontinued yesterday. 


Blood sugars this morning 82.  Insulin held.  





09/22/2020


Blood pressures 139/67


Blood sugars 107 and 145 today.


Tinea to groin and buttocks.  Lotrimin topically started. 


WBC 16.7 likely due to IV decadron.  No bandemia or signs of symptoms of an 

infective process.


BUN 52


CRE 2.0


GFR 32


Nothing acute appreciated on chest xray.





09/24/20


Pt stable without any changes.  


Decreased cough noted


Awaiting placement to [place] on Monday


Isolation precautions removed.


Vital signs trend


-Blood pressure:  105-148/58-90


-T-max:  98.2


-Heart rate:  71-95


-Pulse ox 94-98% on room air


Lab Results


no labs drawn today


PLAN


-awaiting nursing home placement


-Repeat labs as needed


-PT and OT to continue to treat patient








09/25/20


Pt stable without any changes.  


Awaiting placement to [place] on 09/28/20





Vital signs trend


-Blood pressure:  125-147/80-99


-T-max:  98.1


-Heart rate:  


-Pulse ox 96-99% on room air


Lab Results


no labs drawn today


PLAN


-awaiting nursing home placement


-Repeat labs as needed


-PT and OT to continue to treat patient





09/26/20


Patient has remained stable


Nursing is reporting patient is less interactive and appears more confused


Vital signs trend


-Blood pressure: 571076/6289


-T-max 99.1 at 9 PM yesterday


-Heart rate 75-98


-Pulse ox greater than 91% on room air


Lab results


-WBC up from 15.4-18.28, no changes in differential


-GFR stable at 32


PLAN


-SNF placement


-Let me sleep protocol


-Start Seroquel low-dose tonight


-Continue Symbicort


-PRN albuterol


-Repeat labs as needed


-Monitor for hypoxemia


-Monitor urine output


-Renally dosed medications


-Supplementation for B12


-PRN Hydralazine


-Continue home insulin 


-Accu-checks before meals and bedtime


-Hypoglycemia protocol


-Hold PO meds


-Continue home omeprazole, statin, sertaline, metoprolol and amlodipine. 





9/27/2020


Nursing reports patient reports has been drinking minimal amount of amount of 

water


Vital signs trend


-Blood pressure 117-142/61-82


-T-max 99.1


-Heart rate 


-Pulse ox greater than 87% on room air


Lab results


WBC up from 18.28-18.64


Hemoglobin down from 13.4-12.9


Sodium up from 134-135


GFR down from 32-30


Glucose POC: 166-296





9/28/2020


-Doing much worse today


-Lethargic


-Positive for UTI, cultures pending


-Started on Rocephin


-Temp is 99.0.  Usual temp is 97.0.  Blood cultures ordered


-Taking po poorly.  


-Epistaxis to bilateral nares yesterday with rhinorockets placed.  Rhinorockets 

removed today.  No further epistaxis noted.


-Xarelto and ASA discontinued. TEDS ordered


Vital signs trend


-Blood pressure 110-147/74-84


-T-max 99.0


-Heart rate 


-Pulse ox 91-94% on room air


Lab results


WBC down from 20.62 to 17.75


Hemoglobin down from 12.9-12.3


Sodium up from 133-138


GFR up from 29-32


Glucose POC: 166-296


-Phos 2.4


-Mg 1.8


ABG's on room air


-pH 7.54


-pCO2 26.4


-pO2 61.0


-HCO3 22.5


UA


3+ blood


3+ nitrite


3+ leukocyte esterase


Urine WBC 20-30


Moderate urine mucous





09/29/20


-More alert today


-Swallow eval completed by speech therapy-recommend NPO


-Urine cultures susceptible to Rocephin


-Xarelto restarted due to history of A-fib


-Blood cultures pending


Vital signs trend


-Blood pressure 111-147/52-76


-T-max 101.1


-Heart rate 


-Pulse ox 90-96% on room air


Lab Results


-WBC down from 17.75-11.33


-K+ down from 4.3-3.7


-BUN down from 40-37


-Cre 2.0


-GFR 32




















- Plan


Plan:: 








COVID-19


COPD (chronic obstructive pulmonary disease)


-Continue Symbicort


-PRN albuterol


-Repeat labs as needed


-Monitor for hypoxemia





Atrial fibrillation with controlled ventricular rate


-Continue metoprolol


-Restart xarelto


 


Acute on chronic renal failure 2/2 Volume depletion, improved


Hyponatremia, resolved


Iron deficiency anemia


B12 deficiency


-Monitor urine output


-Renally dosed medications


-LR @ 75ml per hour





Hypertension


-Metoprolol


-Continue Lasix 20 mg daily


-Amlodipine 5 mg at night


-PRN Hydralazine





Diabetes mellitus, SiO6s-3.9%


-Continue home insulin 


-Accu-checks before meals and bedtime


-Hypoglycemia protocol


-Hold PO meds





Gastroesophageal reflux disease


-Continue home omeprazole





Dyslipidemia


- Continue home statin





Hypoalbuminemia








Depression


-Continue home sertraline





Orthostatic, resolved





UTI (urinary tract infection)


-Continue Rocephin


-BC pending





PROPHYLAXIS


DVT- home Xarelto


GI- home omeprazole





CODE STATUS: FULL CODE





DISPOSITION:


Nursing home placement on hold due to change in patient status and developing a 

UTI which required IV Rocephin.  Pt swallow eval completed today by speech 

therapist and recommends NPO status.  They will re-evaluate.

## 2020-09-30 NOTE — PCM.PN
- General Info


Date of Service: 09/30/20


Admission Dx/Problem (Free Text): 


                           Admission Diagnosis/Problem





Admission Diagnosis/Problem      Hypoxia








Subjective Update: 





More alert.  Swallow eval today.   Awaiting nursing home placement. On room air.


Functional Status: Reports: Pain Controlled.  Denies: Tolerating Diet (swallow 

eval), Urinating (incontinent)





- Review of Systems


General: Reports: No Symptoms


HEENT: Reports: No Symptoms


Pulmonary: Reports: No Symptoms


Cardiovascular: Reports: No Symptoms.  Denies: Edema


Gastrointestinal: Reports: No Symptoms


Genitourinary: Reports: Incontinence


Musculoskeletal: Reports: No Symptoms


Skin: Reports: No Symptoms


Neurological: Reports: Confusion


Psychiatric: Reports: Confusion





- Patient Data


Weight - Most Recent: 141 lb 1.6 oz





- Exam


Quality Assessment: DVT Prophylaxis.  No: Supplemental Oxygen


General: Alert, Cooperative, No Acute Distress


HEENT: Pupils Equal, Mucous Membr. Moist/Pink


Neck: Supple, Trachea Midline.  No: Lymphadenopathy


Lungs: Clear to Auscultation, Normal Respiratory Effort


Cardiovascular: Regular Rate, Regular Rhythm


GI/Abdominal Exam: Normal Bowel Sounds, Soft, Non-Tender, No Distention


 (Male) Exam: Deferred


Back Exam: Normal Inspection, Full Range of Motion


Extremities: Normal Inspection, Normal Range of Motion


Peripheral Pulses: 2+: Radial (L), Radial (R), Dorsalis Pedis (L), Dorsalis 

Pedis (R)


Skin: Warm, Dry, Intact


Neurological: No New Focal Deficit


Psy/Mental Status: Alert, Normal Affect, Normal Mood





Sepsis Event Note





- Evaluation


Sepsis Screening Result: No Definite Risk





- Problem List & Annotations


(1) Acute on chronic renal failure


SNOMED Code(s): 497951442


   Code(s): N17.9 - ACUTE KIDNEY FAILURE, UNSPECIFIED; N18.9 - CHRONIC KIDNEY 

DISEASE, UNSPECIFIED   Status: Acute   Priority: High   Current Visit: Yes   


Qualifiers: 


   Acute renal failure type: unspecified   Chronic kidney disease stage: 

unspecified stage   Qualified Code(s): N17.9 - Acute kidney failure, 

unspecified; N18.9 - Chronic kidney disease, unspecified   





(2) Atrial fibrillation with controlled ventricular rate


SNOMED Code(s): 22296140


   Code(s): I48.91 - UNSPECIFIED ATRIAL FIBRILLATION   Status: Chronic   Priori

ty: High   Current Visit: Yes   





(3) B12 deficiency


SNOMED Code(s): 518219296


   Code(s): E53.8 - DEFICIENCY OF OTHER SPECIFIED B GROUP VITAMINS   Status: 

Chronic   Priority: Medium   Current Visit: Yes   





(4) COVID-19


SNOMED Code(s): 123480706


   Code(s): U07.1 - COVID-19   Status: Acute   Priority: High   Current Visit: 

Yes   





(5) Depression


SNOMED Code(s): 39567576


   Code(s): F32.9 - MAJOR DEPRESSIVE DISORDER, SINGLE EPISODE, UNSPECIFIED   

Status: Chronic   Priority: Medium   Current Visit: Yes   


Qualifiers: 


   Depression Type: unspecified   Qualified Code(s): F32.9 - Major depressive 

disorder, single episode, unspecified   





(6) Dyslipidemia


SNOMED Code(s): 881445092


   Code(s): E78.5 - HYPERLIPIDEMIA, UNSPECIFIED   Status: Chronic   Priority: 

Medium   Current Visit: Yes   





(7) Hypoalbuminemia


SNOMED Code(s): 633334891


   Code(s): E88.09 - OTH DISORDERS OF PLASMA-PROTEIN METABOLISM, NEC   Status: 

Acute   Priority: High   Current Visit: Yes   





(8) Hyponatremia


SNOMED Code(s): 13853169


   Code(s): E87.1 - HYPO-OSMOLALITY AND HYPONATREMIA   Status: Resolved   

Priority: Low   Current Visit: Yes   





(9) Iron deficiency


SNOMED Code(s): 50489523


   Code(s): E61.1 - IRON DEFICIENCY   Status: Acute   Priority: High   Current 

Visit: Yes   





(10) Orthostasis


SNOMED Code(s): 97130665


   Code(s): I95.1 - ORTHOSTATIC HYPOTENSION   Status: Resolved   Priority: High 

 Current Visit: Yes   





(11) Diabetes


SNOMED Code(s): 58943558


   Code(s): E11.9 - TYPE 2 DIABETES MELLITUS WITHOUT COMPLICATIONS   Status: 

Chronic   Priority: High   Current Visit: Yes   


Qualifiers: 


   Diabetes mellitus type: type 2   Diabetes mellitus long term insulin use: 

unspecified long term insulin use status   Diabetes mellitus complication 

status: without complication   Qualified Code(s): E11.9 - Type 2 diabetes 

mellitus without complications   





(12) GERD (gastroesophageal reflux disease)


SNOMED Code(s): 891516838


   Code(s): K21.9 - GASTRO-ESOPHAGEAL REFLUX DISEASE WITHOUT ESOPHAGITIS   

Status: Chronic   Priority: Medium   Current Visit: No   


Qualifiers: 


   Esophagitis presence: without esophagitis   Qualified Code(s): K21.9 - 

Gastro-esophageal reflux disease without esophagitis   





(13) Hypertension


SNOMED Code(s): 60340697


   Code(s): I10 - ESSENTIAL (PRIMARY) HYPERTENSION   Status: Chronic   Priority:

High   Current Visit: No   


Qualifiers: 


   Hypertension type: unspecified   Qualified Code(s): I10 - Essential (primary)

hypertension   





(14) COPD (chronic obstructive pulmonary disease)


SNOMED Code(s): 95195573


   Code(s): J44.9 - CHRONIC OBSTRUCTIVE PULMONARY DISEASE, UNSPECIFIED   Status:

Chronic   Priority: High   Current Visit: No   


Qualifiers: 


   COPD type: unspecified COPD   Qualified Code(s): J44.9 - Chronic obstructive 

pulmonary disease, unspecified   





(15) UTI (urinary tract infection)


SNOMED Code(s): 09561699


   Code(s): N39.0 - URINARY TRACT INFECTION, SITE NOT SPECIFIED   Status: Acute 

 Priority: High   Current Visit: Yes   


Qualifiers: 


   Urinary tract infection type: acute cystitis   Hematuria presence: with 

hematuria   Qualified Code(s): N30.01 - Acute cystitis with hematuria   





- Problem List Review


Problem List Initiated/Reviewed/Updated: Yes





- My Orders


Last 24 Hours: 


My Active Orders





09/29/20 10:30


Rivaroxaban [Xarelto]   10 mg PO DAILY 





09/29/20 16:00


cefTRIAXone [Rocephin] 2 gm   Sodium Chloride 0.9% [Normal Saline] 100 ml IV 

Q24H 





09/30/20 10:22


Consult to Speech Language Pathology [SLP Evaluation and Treatment] [CONS] 

Routine 














- Assessment


Assessment:: 





9/11/20


1 day of weakness and low O2 sats


Lives in [place], + COVID patients in past week


Pulse ox on admission 89%


CXR with cardiomegaly only, no lung parenchyma changes


Orthostatic --> given 500ml IVF bolus--> still orthostatic--> 2nd bolus


Lab results:


- Hb 13.2, Hct 40.1


- Na 133, GFR 19, glucose 165


- Troponin negative


Heart rate on admission 98


EKG with a. fib., rate 84


RVR will likely increase O2 needs- COVID positive


GFR baseline 26, 19 on admission


Not on any nephrotoxic meds 


Decreased oral intake


Admission /81


Hypertension home management with metoprolol


Diabetes home management with glimepiride, sitagliptin and insulin


Glucose on admission 165


Smoked 1.5 ppd for 51 years; Quit in 2005 


PLAN


- Start Remdesivir


- Start Dexamethasone


- PRN albuterol


- Continue Symbicort


- Hold metoprolol due to orthostatic hypotension


- Monitor for hypoxemia


- Monitor urine output


- Renally dosed medications


- Repeat labs and replace electrolytes as needed  


- Hold metoprolol


- PRN Hydralazine


- Continue home insulin at same dose for now


- No premeals, adjust with new measurements as needed


- New HbA1c


- Accu-checks before meals and bedtime


- Hypoglycemia protocol


- Hold PO meds


- Continue home omeprazole


- Continue home statin


- Dietary consult


- Continue home sertraline


Patient will be admitted to med surg floor, will be started on Remdesivir and 

dexamethasone. 





9/12/20


No longer on oxygen supplementation since 1:30 PM on 9/11/2020


Vital signs trend


Blood pressure: 117 264/50 3-91


T-max 98.2


Heart rate: 66-98


Pulse ox greater than 89%


Lab results


WBC down from 6.96-4.05


Hemoglobin down from 13.2-11.7


D-dimer stable from 0.72 2.71


Sodium down from 1 33-1 32


GFR up from 19-26


B12 87


BNP up from 8187-5486


CRP 5.2


Iron 26


Glucose -258


A1c 6.9%


PLAN


-Continue Remdesivir day 2 out of 5


-Continue dexamethasone day 2 out of 10


-Continue Symbicort


-PRN albuterol


-Repeat labs as needed


-Continue to hold metoprolol


-Monitor for hypoxemia


-Monitor urine output


-Renally dosed medications


-Repeat labs and replace electrolytes as needed  


-Hold metoprolol


-PRN Hydralazine


-Continue home insulin at same dose for now


-Accu-checks before meals and bedtime


-Hypoglycemia protocol


-Hold PO meds


-Continue home omeprazole


- Continue home statin


Patient will remain admitted for antiviral treatment for COVID.


Length of stay at least 5 days due to length of treatment required for COVID.





9/13/20


Shortness of breath has resolved


Has not required oxygen supplementation the past 24 hours


Vital signs trend


Blood pressure 332479/7289


T-max 98.4


Heart rate 66 to 88/min


Pulse ox greater than 97% on room air


New lab results


WBC up from 4.05-7.54


Hemoglobin up from 11.7-11.9


Platelets up from 193-235


D-dimer down from 0.71 2.5


Sodium stable from 1 


Chloride up from 94-96


GFR up from 26-30


Magnesium up from 1.9-2.1


CRP down from 5.2-2.8


Glucose trend 171-220





9/14/2020


Patient is asymptomatic.


Vital signs are stable and pulse ox is in the mid to upper 90s on room air.


CRP 2.0 and normal d-dimer.


Sodium up to 136.


BUN to creatinine ratio is still elevated at 30.0.


Estimated GFR is 30 and stable.  This is his chronic stable state.


Episode of blood sugar of 300 today.


Started sliding scale insulin.





9/15/2020


Patient is feeling well.


Vital signs stable with oxygenation in the mid upper 90s and afebrile.


Systolic blood pressure in the 160s today


Labs held today.


Blood sugars improved.


Awaiting placement.





9/16/2020


Patient is stable without any significant changes.


Blood pressure is ranging in the 150s up to 170s.  Afebrile.  Oxygen saturation 

on room air ranging from 90 to 100%, weight stable at approximately 66 kg


Hemoglobin and white count are stable at 12.0 and 7.9 respectively.


GFR 36 with creatinine of 1.8 which is improved.


BUN stable at 62.  Elevation likely secondary to protein catabolism from 

dexamethasone.


Blood sugars improved ranging from 120 up to 262


Still awaiting placement


Patient has not been getting his ethacrynic acid because it is nonformulary.





9/17/2020


Blood pressure continues to be elevated.  He was started on low-dose Lasix this 

morning.  He would likely benefit from additional treatment.


Improvement in glucose. But continues to be elevated.  This should resolve once 

he is off of dexamethasone.


No significant change overnight.





9/18/2020


Blood pressure is improved with the addition of amlodipine.


Patient has no complaints.


Glucose continues to be elevated secondary to dexamethasone





9/19/2020


Blood pressure is improved but continues to be elevated.


Blood pressure ranges from 122//56


Fingerstick blood sugars also continue to be elevated.


Still off oxygen and generally doing either better or stable on all parameters.





9/20/2020


Blood pressure improved with the last few systolic blood pressures between 149 

and 155


Blood sugars continue to be elevated likely secondary to dexamethasone


Estimated GFR 34.


BUN and creatinine both stable at 61 and 1.9, respectively





09/21/2020


Blood pressures 148/45.


Dexamethasone discontinued yesterday. 


Blood sugars this morning 82.  Insulin held.  





09/22/2020


Blood pressures 139/67


Blood sugars 107 and 145 today.


Tinea to groin and buttocks.  Lotrimin topically started. 


WBC 16.7 likely due to IV decadron.  No bandemia or signs of symptoms of an 

infective process.


BUN 52


CRE 2.0


GFR 32


Nothing acute appreciated on chest xray.





09/24/20


Pt stable without any changes.  


Decreased cough noted


Awaiting placement to [facility] on Monday


Isolation precautions removed.


Vital signs trend


-Blood pressure:  105-148/58-90


-T-max:  98.2


-Heart rate:  71-95


-Pulse ox 94-98% on room air


Lab Results


no labs drawn today


PLAN


-awaiting nursing home placement


-Repeat labs as needed


-PT and OT to continue to treat patient








09/25/20


Pt stable without any changes.  


Awaiting placement to [facility] on 09/28/20





Vital signs trend


-Blood pressure:  125-147/80-99


-T-max:  98.1


-Heart rate:  


-Pulse ox 96-99% on room air


Lab Results


no labs drawn today


PLAN


-awaiting nursing home placement


-Repeat labs as needed


-PT and OT to continue to treat patient





09/26/20


Patient has remained stable


Nursing is reporting patient is less interactive and appears more confused


Vital signs trend


-Blood pressure: 238746/6289


-T-max 99.1 at 9 PM yesterday


-Heart rate 75-98


-Pulse ox greater than 91% on room air


Lab results


-WBC up from 15.4-18.28, no changes in differential


-GFR stable at 32


PLAN


-SNF placement


-Let me sleep protocol


-Start Seroquel low-dose tonight


-Continue Symbicort


-PRN albuterol


-Repeat labs as needed


-Monitor for hypoxemia


-Monitor urine output


-Renally dosed medications


-Supplementation for B12


-PRN Hydralazine


-Continue home insulin 


-Accu-checks before meals and bedtime


-Hypoglycemia protocol


-Hold PO meds


-Continue home omeprazole, statin, sertaline, metoprolol and amlodipine. 





9/27/2020


Nursing reports patient reports has been drinking minimal amount of amount of 

water


Vital signs trend


-Blood pressure 117-142/61-82


-T-max 99.1


-Heart rate 


-Pulse ox greater than 87% on room air


Lab results


WBC up from 18.28-18.64


Hemoglobin down from 13.4-12.9


Sodium up from 134-135


GFR down from 32-30


Glucose POC: 166-296





9/28/2020


-Doing much worse today


-Lethargic


-Positive for UTI, cultures pending


-Started on Rocephin


-Temp is 99.0.  Usual temp is 97.0.  Blood cultures ordered


-Taking po poorly.  


-Epistaxis to bilateral nares yesterday with rhinorockets placed.  Rhinorockets 

removed today.  No further epistaxis noted.


-Xarelto and ASA discontinued. TEDS ordered


Vital signs trend


-Blood pressure 110-147/74-84


-T-max 99.0


-Heart rate 


-Pulse ox 91-94% on room air


Lab results


WBC down from 20.62 to 17.75


Hemoglobin down from 12.9-12.3


Sodium up from 133-138


GFR up from 29-32


Glucose POC: 166-296


-Phos 2.4


-Mg 1.8


ABG's on room air


-pH 7.54


-pCO2 26.4


-pO2 61.0


-HCO3 22.5


UA


3+ blood


3+ nitrite


3+ leukocyte esterase


Urine WBC 20-30


Moderate urine mucous





09/29/20


-More alert today


-Swallow eval completed by speech therapy-recommend NPO


-Urine cultures susceptible to Rocephin


-Xarelto restarted due to history of A-fib


-Blood cultures pending


Vital signs trend


-Blood pressure 111-147/52-76


-T-max 101.1


-Heart rate 


-Pulse ox 90-96% on room air


Lab Results


-WBC down from 17.75-11.33


-K+ down from 4.3-3.7


-BUN down from 40-37


-Cre 2.0


-GFR 32





09/30/20


-More alert today


-Swallow eval completed by speech therapy-regular diet with nectar thick liquids


-Blood cultures no growth


Vital signs trend


-Blood pressure 113-146/57-74


-T-max 98.8


-Heart rate 


-Pulse ox 90-93 on room air


Lab Results


-WBC down from 11.33-10.56


-K+ up from 3.7-3.9


-BUN 37


-Cre 0.9 from 2.0


-GFR up from 32-34























- Plan


Plan:: 








COVID-19


COPD (chronic obstructive pulmonary disease)


-Continue Symbicort


-PRN albuterol


-Repeat labs as needed


-Monitor for hypoxemia





Atrial fibrillation with controlled ventricular rate


-Continue metoprolol


-On xarelto


 


Acute on chronic renal failure 2/2 Volume depletion, improved


Hyponatremia, resolved


Iron deficiency anemia


B12 deficiency


-Monitor urine output


-Renally dosed medications





Hypertension


-Metoprolol


-Continue Lasix 20 mg daily


-Amlodipine 5 mg at night


-PRN Hydralazine





Diabetes mellitus, VoK1c-4.9%


-Accu-checks before meals and bedtime


-Hypoglycemia protocol


-Hold PO meds





Gastroesophageal reflux disease


-Continue home omeprazole





Dyslipidemia


- Continue home statin





Hypoalbuminemia








Depression


-Continue home sertraline





Orthostatic, resolved





UTI (urinary tract infection)


-Continue Rocephin


-BC no growth.





PROPHYLAXIS


DVT- home Xarelto


GI- home omeprazole





CODE STATUS: FULL CODE





DISPOSITION


  Pt swallow eval completed today by speech therapist and recommend regular diet

 with nectar thick liquids.    Plan for [facility] nursing [facility] Monday.

## 2020-10-01 NOTE — PCM.PN
- General Info


Date of Service: 10/01/20


Admission Dx/Problem (Free Text): 


                           Admission Diagnosis/Problem





Admission Diagnosis/Problem      Hypoxia








Subjective Update: 





About the same.  Breathing seems a little labored.  No fever or cough noted.


Functional Status: Reports: Pain Controlled, Tolerating Diet, Urinating 

(incontinent)





- Review of Systems


General: Reports: No Symptoms


HEENT: Reports: No Symptoms


Pulmonary: Reports: Shortness of Breath, Wheezing (exp on the left).  Denies: 

Cough, Sputum


Cardiovascular: Reports: No Symptoms


Gastrointestinal: Reports: No Symptoms


Genitourinary: Reports: Incontinence


Musculoskeletal: Reports: No Symptoms


Skin: Reports: No Symptoms


Neurological: Reports: No Symptoms


Psychiatric: Reports: No Symptoms





- Patient Data


Weight - Most Recent: 141 lb 3.2 oz





- Exam


Quality Assessment: DVT Prophylaxis (home xarelto).  No: Supplemental Oxygen


General: Alert, Cooperative, Mild Distress.  No: Oriented


HEENT: Pupils Equal, Mucous Membr. Moist/Pink


Neck: Supple, Trachea Midline.  No: Lymphadenopathy


Lungs: Wheezing (exp wheeze on the left)


Cardiovascular: Irregular Rhythm


GI/Abdominal Exam: Normal Bowel Sounds, Soft, Non-Tender, No Distention


 (Male) Exam: Deferred


Back Exam: Normal Inspection, Full Range of Motion


Extremities: Normal Inspection, Normal Range of Motion, Non-Tender, No Pedal 

Edema, Normal Capillary Refill


Peripheral Pulses: 2+: Radial (L), Radial (R), Dorsalis Pedis (L), Dorsalis 

Pedis (R)


Skin: Warm, Dry, Intact


Neurological: No New Focal Deficit


Psy/Mental Status: Alert, Normal Affect, Normal Mood





Sepsis Event Note





- Evaluation


Sepsis Screening Result: No Definite Risk





- Problem List & Annotations


(1) Acute on chronic renal failure


SNOMED Code(s): 857898747


   Code(s): N17.9 - ACUTE KIDNEY FAILURE, UNSPECIFIED; N18.9 - CHRONIC KIDNEY 

DISEASE, UNSPECIFIED   Status: Acute   Priority: High   Current Visit: Yes   


Qualifiers: 


   Acute renal failure type: unspecified   Chronic kidney disease stage: 

unspecified stage   Qualified Code(s): N17.9 - Acute kidney failure, 

unspecified; N18.9 - Chronic kidney disease, unspecified   





(2) Atrial fibrillation with controlled ventricular rate


SNOMED Code(s): 89519084


   Code(s): I48.91 - UNSPECIFIED ATRIAL FIBRILLATION   Status: Chronic   

Priority: High   Current Visit: Yes   





(3) B12 deficiency


SNOMED Code(s): 189112934


   Code(s): E53.8 - DEFICIENCY OF OTHER SPECIFIED B GROUP VITAMINS   Status: 

Chronic   Priority: Medium   Current Visit: Yes   





(4) COVID-19


SNOMED Code(s): 862496525


   Code(s): U07.1 -    Status: Acute   Priority: High   Current Visit: Yes   





(5) Depression


SNOMED Code(s): 57843941


   Code(s): F32.9 - MAJOR DEPRESSIVE DISORDER, SINGLE EPISODE, UNSPECIFIED   

Status: Chronic   Priority: Medium   Current Visit: Yes   


Qualifiers: 


   Depression Type: unspecified   Qualified Code(s): F32.9 - Major depressive 

disorder, single episode, unspecified   





(6) Dyslipidemia


SNOMED Code(s): 694963702


   Code(s): E78.5 - HYPERLIPIDEMIA, UNSPECIFIED   Status: Chronic   Priority: 

Medium   Current Visit: Yes   





(7) Hypoalbuminemia


SNOMED Code(s): 081684294


   Code(s): E88.09 - OTH DISORDERS OF PLASMA-PROTEIN METABOLISM, NEC   Status: 

Acute   Priority: High   Current Visit: Yes   





(8) Hyponatremia


SNOMED Code(s): 78063394


   Code(s): E87.1 - HYPO-OSMOLALITY AND HYPONATREMIA   Status: Resolved   

Priority: Low   Current Visit: Yes   





(9) Iron deficiency


SNOMED Code(s): 34970448


   Code(s): E61.1 - IRON DEFICIENCY   Status: Acute   Priority: High   Current 

Visit: Yes   





(10) Orthostasis


SNOMED Code(s): 12302201


   Code(s): I95.1 - ORTHOSTATIC HYPOTENSION   Status: Resolved   Priority: High 

 Current Visit: Yes   





(11) Diabetes


SNOMED Code(s): 90603091


   Code(s): E11.9 - TYPE 2 DIABETES MELLITUS WITHOUT COMPLICATIONS   Status: 

Chronic   Priority: High   Current Visit: Yes   


Qualifiers: 


   Diabetes mellitus type: type 2   Diabetes mellitus long term insulin use: 

unspecified long term insulin use status   Diabetes mellitus complication statu

s: without complication   Qualified Code(s): E11.9 - Type 2 diabetes mellitus 

without complications   





(12) GERD (gastroesophageal reflux disease)


SNOMED Code(s): 357306113


   Code(s): K21.9 - GASTRO-ESOPHAGEAL REFLUX DISEASE WITHOUT ESOPHAGITIS   

Status: Chronic   Priority: Medium   Current Visit: No   


Qualifiers: 


   Esophagitis presence: without esophagitis   Qualified Code(s): K21.9 - 

Gastro-esophageal reflux disease without esophagitis   





(13) Hypertension


SNOMED Code(s): 27642176


   Code(s): I10 - ESSENTIAL (PRIMARY) HYPERTENSION   Status: Chronic   Priority:

High   Current Visit: No   


Qualifiers: 


   Hypertension type: unspecified   Qualified Code(s): I10 - Essential (primary)

hypertension   





(14) COPD (chronic obstructive pulmonary disease)


SNOMED Code(s): 24111248


   Code(s): J44.9 - CHRONIC OBSTRUCTIVE PULMONARY DISEASE, UNSPECIFIED   Status:

Chronic   Priority: High   Current Visit: No   


Qualifiers: 


   COPD type: unspecified COPD   Qualified Code(s): J44.9 - Chronic obstructive 

pulmonary disease, unspecified   





(15) UTI (urinary tract infection)


SNOMED Code(s): 02562619


   Code(s): N39.0 - URINARY TRACT INFECTION, SITE NOT SPECIFIED   Status: Acute 

 Priority: High   Current Visit: Yes   


Qualifiers: 


   Urinary tract infection type: acute cystitis   Hematuria presence: with 

hematuria   Qualified Code(s): N30.01 - Acute cystitis with hematuria   





- Problem List Review


Problem List Initiated/Reviewed/Updated: Yes





- My Orders


Last 24 Hours: 


My Active Orders





10/01/20 09:18


Chest 1V Frontal [CR] Routine 














- Assessment


Assessment:: 





9/11/20


1 day of weakness and low O2 sats


Lives in [place], + COVID patients in past week


Pulse ox on admission 89%


CXR with cardiomegaly only, no lung parenchyma changes


Orthostatic --> given 500ml IVF bolus--> still orthostatic--> 2nd bolus


Lab results:


- Hb 13.2, Hct 40.1


- Na 133, GFR 19, glucose 165


- Troponin negative


Heart rate on admission 98


EKG with a. fib., rate 84


RVR will likely increase O2 needs- COVID positive


GFR baseline 26, 19 on admission


Not on any nephrotoxic meds 


Decreased oral intake


Admission /81


Hypertension home management with metoprolol


Diabetes home management with glimepiride, sitagliptin and insulin


Glucose on admission 165


Smoked 1.5 ppd for 51 years; Quit in 2005 


PLAN


- Start Remdesivir


- Start Dexamethasone


- PRN albuterol


- Continue Symbicort


- Hold metoprolol due to orthostatic hypotension


- Monitor for hypoxemia


- Monitor urine output


- Renally dosed medications


- Repeat labs and replace electrolytes as needed  


- Hold metoprolol


- PRN Hydralazine


- Continue home insulin at same dose for now


- No premeals, adjust with new measurements as needed


- New HbA1c


- Accu-checks before meals and bedtime


- Hypoglycemia protocol


- Hold PO meds


- Continue home omeprazole


- Continue home statin


- Dietary consult


- Continue home sertraline


Patient will be admitted to med surg floor, will be started on Remdesivir and 

dexamethasone. 





9/12/20


No longer on oxygen supplementation since 1:30 PM on 9/11/2020


Vital signs trend


Blood pressure: 117 264/50 3-91


T-max 98.2


Heart rate: 66-98


Pulse ox greater than 89%


Lab results


WBC down from 6.96-4.05


Hemoglobin down from 13.2-11.7


D-dimer stable from 0.72 2.71


Sodium down from 1 33-1 32


GFR up from 19-26


B12 87


BNP up from 0269-0402


CRP 5.2


Iron 26


Glucose -258


A1c 6.9%


PLAN


-Continue Remdesivir day 2 out of 5


-Continue dexamethasone day 2 out of 10


-Continue Symbicort


-PRN albuterol


-Repeat labs as needed


-Continue to hold metoprolol


-Monitor for hypoxemia


-Monitor urine output


-Renally dosed medications


-Repeat labs and replace electrolytes as needed  


-Hold metoprolol


-PRN Hydralazine


-Continue home insulin at same dose for now


-Accu-checks before meals and bedtime


-Hypoglycemia protocol


-Hold PO meds


-Continue home omeprazole


- Continue home statin


Patient will remain admitted for antiviral treatment for COVID.


Length of stay at least 5 days due to length of treatment required for COVID.





9/13/20


Shortness of breath has resolved


Has not required oxygen supplementation the past 24 hours


Vital signs trend


Blood pressure 487521/7289


T-max 98.4


Heart rate 66 to 88/min


Pulse ox greater than 97% on room air


New lab results


WBC up from 4.05-7.54


Hemoglobin up from 11.7-11.9


Platelets up from 193-235


D-dimer down from 0.71 2.5


Sodium stable from 1 


Chloride up from 94-96


GFR up from 26-30


Magnesium up from 1.9-2.1


CRP down from 5.2-2.8


Glucose trend 171-220





9/14/2020


Patient is asymptomatic.


Vital signs are stable and pulse ox is in the mid to upper 90s on room air.


CRP 2.0 and normal d-dimer.


Sodium up to 136.


BUN to creatinine ratio is still elevated at 30.0.


Estimated GFR is 30 and stable.  This is his chronic stable state.


Episode of blood sugar of 300 today.


Started sliding scale insulin.





9/15/2020


Patient is feeling well.


Vital signs stable with oxygenation in the mid upper 90s and afebrile.


Systolic blood pressure in the 160s today


Labs held today.


Blood sugars improved.


Awaiting placement.





9/16/2020


Patient is stable without any significant changes.


Blood pressure is ranging in the 150s up to 170s.  Afebrile.  Oxygen saturation 

on room air ranging from 90 to 100%, weight stable at approximately 66 kg


Hemoglobin and white count are stable at 12.0 and 7.9 respectively.


GFR 36 with creatinine of 1.8 which is improved.


BUN stable at 62.  Elevation likely secondary to protein catabolism from 

dexamethasone.


Blood sugars improved ranging from 120 up to 262


Still awaiting placement


Patient has not been getting his ethacrynic acid because it is nonformulary.





9/17/2020


Blood pressure continues to be elevated.  He was started on low-dose Lasix this 

morning.  He would likely benefit from additional treatment.


Improvement in glucose. But continues to be elevated.  This should resolve once 

he is off of dexamethasone.


No significant change overnight.





9/18/2020


Blood pressure is improved with the addition of amlodipine.


Patient has no complaints.


Glucose continues to be elevated secondary to dexamethasone





9/19/2020


Blood pressure is improved but continues to be elevated.


Blood pressure ranges from 122//56


Fingerstick blood sugars also continue to be elevated.


Still off oxygen and generally doing either better or stable on all parameters.





9/20/2020


Blood pressure improved with the last few systolic blood pressures between 149 

and 155


Blood sugars continue to be elevated likely secondary to dexamethasone


Estimated GFR 34.


BUN and creatinine both stable at 61 and 1.9, respectively





09/21/2020


Blood pressures 148/45.


Dexamethasone discontinued yesterday. 


Blood sugars this morning 82.  Insulin held.  





09/22/2020


Blood pressures 139/67


Blood sugars 107 and 145 today.


Tinea to groin and buttocks.  Lotrimin topically started. 


WBC 16.7 likely due to IV decadron.  No bandemia or signs of symptoms of an 

infective process.


BUN 52


CRE 2.0


GFR 32


Nothing acute appreciated on chest xray.





09/24/20


Pt stable without any changes.  


Decreased cough noted


Awaiting placement to [facility] on Monday


Isolation precautions removed.


Vital signs trend


-Blood pressure:  105-148/58-90


-T-max:  98.2


-Heart rate:  71-95


-Pulse ox 94-98% on room air


Lab Results


no labs drawn today


PLAN


-awaiting nursing home placement


-Repeat labs as needed


-PT and OT to continue to treat patient








09/25/20


Pt stable without any changes.  


[facility]waiting placement to [facility] on 09/28/20





Vital signs trend


-Blood pressure:  125-147/80-99


-T-max:  98.1


-Heart rate:  


-Pulse ox 96-99% on room air


Lab Results


no labs drawn today


PLAN


-awaiting nursing home placement


-Repeat labs as needed


-PT and OT to continue to treat patient





09/26/20


Patient has remained stable


Nursing is reporting patient is less interactive and appears more confused


Vital signs trend


-Blood pressure: 139466/6289


-T-max 99.1 at 9 PM yesterday


-Heart rate 75-98


-Pulse ox greater than 91% on room air


Lab results


-WBC up from 15.4-18.28, no changes in differential


-GFR stable at 32


PLAN


-SNF placement


-Let me sleep protocol


-Start Seroquel low-dose tonight


-Continue Symbicort


-PRN albuterol


-Repeat labs as needed


-Monitor for hypoxemia


-Monitor urine output


-Renally dosed medications


-Supplementation for B12


-PRN Hydralazine


-Continue home insulin 


-Accu-checks before meals and bedtime


-Hypoglycemia protocol


-Hold PO meds


-Continue home omeprazole, statin, sertaline, metoprolol and amlodipine. 





9/27/2020


Nursing reports patient reports has been drinking minimal amount of amount of 

water


Vital signs trend


-Blood pressure 117-142/61-82


-T-max 99.1


-Heart rate 


-Pulse ox greater than 87% on room air


Lab results


WBC up from 18.28-18.64


Hemoglobin down from 13.4-12.9


Sodium up from 134-135


GFR down from 32-30


Glucose POC: 166-296





9/28/2020


-Doing much worse today


-Lethargic


-Positive for UTI, cultures pending


-Started on Rocephin


-Temp is 99.0.  Usual temp is 97.0.  Blood cultures ordered


-Taking po poorly.  


-Epistaxis to bilateral nares yesterday with rhinorockets placed.  Rhinorockets 

removed today.  No further epistaxis noted.


-Xarelto and ASA discontinued. TEDS ordered


Vital signs trend


-Blood pressure 110-147/74-84


-T-max 99.0


-Heart rate 


-Pulse ox 91-94% on room air


Lab results


WBC down from 20.62 to 17.75


Hemoglobin down from 12.9-12.3


Sodium up from 133-138


GFR up from 29-32


Glucose POC: 166-296


-Phos 2.4


-Mg 1.8


ABG's on room air


-pH 7.54


-pCO2 26.4


-pO2 61.0


-HCO3 22.5


UA


3+ blood


3+ nitrite


3+ leukocyte esterase


Urine WBC 20-30


Moderate urine mucous





09/29/20


-More alert today


-Swallow eval completed by speech therapy-recommend NPO


-Urine cultures susceptible to Rocephin


-Xarelto restarted due to history of A-fib


-Blood cultures pending


Vital signs trend


-Blood pressure 111-147/52-76


-T-max 101.1


-Heart rate 


-Pulse ox 90-96% on room air


Lab Results


-WBC down from 17.75-11.33


-K+ down from 4.3-3.7


-BUN down from 40-37


-Cre 2.0


-GFR 32





09/30/20


-More alert today


-Swallow eval completed by speech therapy-regular diet with nectar thick liquids


-Blood cultures no growth


Vital signs trend


-Blood pressure 113-146/57-74


-T-max 98.8


-Heart rate 


-Pulse ox 90-93 on room air


Lab Results


-WBC down from 11.33-10.56


-K+ up from 3.7-3.9


-BUN 37


-Cre 0.9 from 2.0


-GFR up from 32-34





10/01/20


-Labored breathing today-chest xray obtained


-Day 3 Rocephin


Vital signs trend


-Blood pressure 124-138/64-85


-T-max 98.2


-Heart rate 74-96


-Pulse ox 90-94 on room air


Lab Results


-WBC up from 10.56-10.88


-Hgb up from 10.9 to 12.6


-BUN 37


-Cre 2.0 up from 1.9


-GFR down to 32 from 34


-AST up to 70 from 40


-ALT up to 79 from 52


























- Plan


Plan:: 








COVID-19


COPD (chronic obstructive pulmonary disease)


-Continue Symbicort


-PRN albuterol


-Repeat labs as needed


-Monitor for hypoxemia





Atrial fibrillation with controlled ventricular rate


-Continue metoprolol


-On xarelto


 


Acute on chronic renal failure 2/2 Volume depletion, improved


Hyponatremia, resolved


Iron deficiency anemia


B12 deficiency


-Monitor urine output


-Renally dosed medications





Hypertension


-Metoprolol


-Continue Lasix 20 mg daily


-Amlodipine 5 mg at night


-PRN Hydralazine





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


-Accu-checks before meals and bedtime


-Hypoglycemia protocol


-Hold PO meds





Gastroesophageal reflux disease


-Continue home omeprazole





Dyslipidemia


- Continue home statin





Hypoalbuminemia








Depression


-Continue home sertraline





Orthostatic, resolved





UTI (urinary tract infection)


-Continue Rocephin


-BC no growth.





PROPHYLAXIS


DVT- home Xarelto


GI- home omeprazole





CODE STATUS: FULL CODE





DISPOSITION


  Pt swallow eval completed today by speech therapist and recommend regular diet

 with nectar thick liquids.    Plan for [facility] Monday.

## 2020-10-02 NOTE — PCM.PN
- General Info


Date of Service: 10/02/20


Admission Dx/Problem (Free Text): 


                           Admission Diagnosis/Problem





Admission Diagnosis/Problem      Hypoxia








Subjective Update: 





Doing the same. Having several diarrhea stools.  Had a fall yesterday-no 

injuries.


Functional Status: Reports: Pain Controlled, Tolerating Diet, Urinating 

(incontinent)





- Review of Systems


General: Reports: No Symptoms


HEENT: Reports: No Symptoms


Pulmonary: Reports: No Symptoms


Cardiovascular: Reports: No Symptoms


Gastrointestinal: Reports: Diarrhea


Genitourinary: Reports: No Symptoms, Incontinence


Musculoskeletal: Reports: No Symptoms


Skin: Reports: Other (eraser sized pressure ulcer to coccyx)


Neurological: Reports: Confusion


Psychiatric: Reports: Confusion





- Patient Data


Weight - Most Recent: 140 lb 9.6 oz





- Exam


Quality Assessment: DVT Prophylaxis, Skin Breakdown (eraser sized ulcer to 

coccyx)


General: Alert, Cooperative, No Acute Distress


HEENT: Pupils Equal, Mucous Membr. Moist/Pink


Neck: Supple, Trachea Midline.  No: Lymphadenopathy


Lungs: Clear to Auscultation, Normal Respiratory Effort


Cardiovascular: Regular Rate, Regular Rhythm


GI/Abdominal Exam: Normal Bowel Sounds, Soft, Non-Tender, No Distention


 (Male) Exam: Deferred


Back Exam: Normal Inspection, Full Range of Motion


Extremities: Normal Inspection, Normal Range of Motion, Non-Tender, No Pedal 

Edema, Normal Capillary Refill


Peripheral Pulses: 2+: Radial (L), Radial (R), Dorsalis Pedis (L), Dorsalis 

Pedis (R)


Skin: Warm, Dry, Intact


Neurological: No New Focal Deficit


Psy/Mental Status: Alert, Normal Affect, Normal Mood





Sepsis Event Note





- Evaluation


Sepsis Screening Result: No Definite Risk





- Problem List & Annotations


(1) Acute on chronic renal failure


SNOMED Code(s): 105549080


   Code(s): N17.9 - ACUTE KIDNEY FAILURE, UNSPECIFIED; N18.9 - CHRONIC KIDNEY 

DISEASE, UNSPECIFIED   Status: Acute   Priority: High   Current Visit: Yes   


Qualifiers: 


   Acute renal failure type: unspecified   Chronic kidney disease stage: 

unspecified stage   Qualified Code(s): N17.9 - Acute kidney failure, 

unspecified; N18.9 - Chronic kidney disease, unspecified   





(2) Atrial fibrillation with controlled ventricular rate


SNOMED Code(s): 72951878


   Code(s): I48.91 - UNSPECIFIED ATRIAL FIBRILLATION   Status: Chronic   

Priority: High   Current Visit: Yes   





(3) B12 deficiency


SNOMED Code(s): 270241790


   Code(s): E53.8 - DEFICIENCY OF OTHER SPECIFIED B GROUP VITAMINS   Status: 

Chronic   Priority: Medium   Current Visit: Yes   





(4) COVID-19


SNOMED Code(s): 491078903


   Code(s): U07.1 - COVID-19   Status: Acute   Priority: High   Current Visit: 

Yes   





(5) Depression


SNOMED Code(s): 98718378


   Code(s): F32.9 - MAJOR DEPRESSIVE DISORDER, SINGLE EPISODE, UNSPECIFIED   

Status: Chronic   Priority: Medium   Current Visit: Yes   


Qualifiers: 


   Depression Type: unspecified   Qualified Code(s): F32.9 - Major depressive 

disorder, single episode, unspecified   





(6) Dyslipidemia


SNOMED Code(s): 855814061


   Code(s): E78.5 - HYPERLIPIDEMIA, UNSPECIFIED   Status: Chronic   Priority: 

Medium   Current Visit: Yes   





(7) Hypoalbuminemia


SNOMED Code(s): 114913643


   Code(s): E88.09 - OTH DISORDERS OF PLASMA-PROTEIN METABOLISM, NEC   Status: 

Acute   Priority: High   Current Visit: Yes   





(8) Hyponatremia


SNOMED Code(s): 04767316


   Code(s): E87.1 - HYPO-OSMOLALITY AND HYPONATREMIA   Status: Resolved   

Priority: Low   Current Visit: Yes   





(9) Iron deficiency


SNOMED Code(s): 37744002


   Code(s): E61.1 - IRON DEFICIENCY   Status: Acute   Priority: High   Current 

Visit: Yes   





(10) Orthostasis


SNOMED Code(s): 05619986


   Code(s): I95.1 - ORTHOSTATIC HYPOTENSION   Status: Resolved   Priority: High 

 Current Visit: Yes   





(11) Diabetes


SNOMED Code(s): 74706634


   Code(s): E11.9 - TYPE 2 DIABETES MELLITUS WITHOUT COMPLICATIONS   Status: 

Chronic   Priority: High   Current Visit: Yes   


Qualifiers: 


   Diabetes mellitus type: type 2   Diabetes mellitus long term insulin use: 

unspecified long term insulin use status   Diabetes mellitus complication 

status: without complication   Qualified Code(s): E11.9 - Type 2 diabetes 

mellitus without complications   





(12) GERD (gastroesophageal reflux disease)


SNOMED Code(s): 708309467


   Code(s): K21.9 - GASTRO-ESOPHAGEAL REFLUX DISEASE WITHOUT ESOPHAGITIS   

Status: Chronic   Priority: Medium   Current Visit: No   


Qualifiers: 


   Esophagitis presence: without esophagitis   Qualified Code(s): K21.9 - 

Gastro-esophageal reflux disease without esophagitis   





(13) Hypertension


SNOMED Code(s): 04833369


   Code(s): I10 - ESSENTIAL (PRIMARY) HYPERTENSION   Status: Chronic   Priority:

High   Current Visit: No   


Qualifiers: 


   Hypertension type: unspecified   Qualified Code(s): I10 - Essential (primary)

hypertension   





(14) COPD (chronic obstructive pulmonary disease)


SNOMED Code(s): 30874242


   Code(s): J44.9 - CHRONIC OBSTRUCTIVE PULMONARY DISEASE, UNSPECIFIED   Status:

Chronic   Priority: High   Current Visit: No   


Qualifiers: 


   COPD type: unspecified COPD   Qualified Code(s): J44.9 - Chronic obstructive 

pulmonary disease, unspecified   





(15) UTI (urinary tract infection)


SNOMED Code(s): 31037005


   Code(s): N39.0 - URINARY TRACT INFECTION, SITE NOT SPECIFIED   Status: Acute 

 Priority: High   Current Visit: Yes   


Qualifiers: 


   Urinary tract infection type: acute cystitis   Hematuria presence: with 

hematuria   Qualified Code(s): N30.01 - Acute cystitis with hematuria   





- Problem List Review


Problem List Initiated/Reviewed/Updated: Yes





- My Orders


Last 24 Hours: 


My Active Orders





10/01/20 09:18


Chest 1V Frontal [CR] Routine 





10/02/20 08:41


C DIFFICILE PCR W/REFLEX [MOLEC] Stat 














- Assessment


Assessment:: 





9/11/20


1 day of weakness and low O2 sats


Lives in [place], + COVID patients in past week


Pulse ox on admission 89%


CXR with cardiomegaly only, no lung parenchyma changes


Orthostatic --> given 500ml IVF bolus--> still orthostatic--> 2nd bolus


Lab results:


- Hb 13.2, Hct 40.1


- Na 133, GFR 19, glucose 165


- Troponin negative


Heart rate on admission 98


EKG with a. fib., rate 84


RVR will likely increase O2 needs- COVID positive


GFR baseline 26, 19 on admission


Not on any nephrotoxic meds 


Decreased oral intake


Admission /81


Hypertension home management with metoprolol


Diabetes home management with glimepiride, sitagliptin and insulin


Glucose on admission 165


Smoked 1.5 ppd for 51 years; Quit in 2005 


PLAN


- Start Remdesivir


- Start Dexamethasone


- PRN albuterol


- Continue Symbicort


- Hold metoprolol due to orthostatic hypotension


- Monitor for hypoxemia


- Monitor urine output


- Renally dosed medications


- Repeat labs and replace electrolytes as needed  


- Hold metoprolol


- PRN Hydralazine


- Continue home insulin at same dose for now


- No premeals, adjust with new measurements as needed


- New HbA1c


- Accu-checks before meals and bedtime


- Hypoglycemia protocol


- Hold PO meds


- Continue home omeprazole


- Continue home statin


- Dietary consult


- Continue home sertraline


Patient will be admitted to med surg floor, will be started on Remdesivir and 

dexamethasone. 





9/12/20


No longer on oxygen supplementation since 1:30 PM on 9/11/2020


Vital signs trend


Blood pressure: 117 264/50 3-91


T-max 98.2


Heart rate: 66-98


Pulse ox greater than 89%


Lab results


WBC down from 6.96-4.05


Hemoglobin down from 13.2-11.7


D-dimer stable from 0.72 2.71


Sodium down from 1 33-1 32


GFR up from 19-26


B12 87


BNP up from 5265-2506


CRP 5.2


Iron 26


Glucose -258


A1c 6.9%


PLAN


-Continue Remdesivir day 2 out of 5


-Continue dexamethasone day 2 out of 10


-Continue Symbicort


-PRN albuterol


-Repeat labs as needed


-Continue to hold metoprolol


-Monitor for hypoxemia


-Monitor urine output


-Renally dosed medications


-Repeat labs and replace electrolytes as needed  


-Hold metoprolol


-PRN Hydralazine


-Continue home insulin at same dose for now


-Accu-checks before meals and bedtime


-Hypoglycemia protocol


-Hold PO meds


-Continue home omeprazole


- Continue home statin


Patient will remain admitted for antiviral treatment for COVID.


Length of stay at least 5 days due to length of treatment required for COVID.





9/13/20


Shortness of breath has resolved


Has not required oxygen supplementation the past 24 hours


Vital signs trend


Blood pressure 627974/7289


T-max 98.4


Heart rate 66 to 88/min


Pulse ox greater than 97% on room air


New lab results


WBC up from 4.05-7.54


Hemoglobin up from 11.7-11.9


Platelets up from 193-235


D-dimer down from 0.71 2.5


Sodium stable from 1 


Chloride up from 94-96


GFR up from 26-30


Magnesium up from 1.9-2.1


CRP down from 5.2-2.8


Glucose trend 171-220





9/14/2020


Patient is asymptomatic.


Vital signs are stable and pulse ox is in the mid to upper 90s on room air.


CRP 2.0 and normal d-dimer.


Sodium up to 136.


BUN to creatinine ratio is still elevated at 30.0.


Estimated GFR is 30 and stable.  This is his chronic stable state.


Episode of blood sugar of 300 today.


Started sliding scale insulin.





9/15/2020


Patient is feeling well.


Vital signs stable with oxygenation in the mid upper 90s and afebrile.


Systolic blood pressure in the 160s today


Labs held today.


Blood sugars improved.


Awaiting placement.





9/16/2020


Patient is stable without any significant changes.


Blood pressure is ranging in the 150s up to 170s.  Afebrile.  Oxygen saturation 

on room air ranging from 90 to 100%, weight stable at approximately 66 kg


Hemoglobin and white count are stable at 12.0 and 7.9 respectively.


GFR 36 with creatinine of 1.8 which is improved.


BUN stable at 62.  Elevation likely secondary to protein catabolism from 

dexamethasone.


Blood sugars improved ranging from 120 up to 262


Still awaiting placement


Patient has not been getting his ethacrynic acid because it is nonformulary.





9/17/2020


Blood pressure continues to be elevated.  He was started on low-dose Lasix this 

morning.  He would likely benefit from additional treatment.


Improvement in glucose. But continues to be elevated.  This should resolve once 

he is off of dexamethasone.


No significant change overnight.





9/18/2020


Blood pressure is improved with the addition of amlodipine.


Patient has no complaints.


Glucose continues to be elevated secondary to dexamethasone





9/19/2020


Blood pressure is improved but continues to be elevated.


Blood pressure ranges from 122//56


Fingerstick blood sugars also continue to be elevated.


Still off oxygen and generally doing either better or stable on all parameters.





9/20/2020


Blood pressure improved with the last few systolic blood pressures between 149 

and 155


Blood sugars continue to be elevated likely secondary to dexamethasone


Estimated GFR 34.


BUN and creatinine both stable at 61 and 1.9, respectively





09/21/2020


Blood pressures 148/45.


Dexamethasone discontinued yesterday. 


Blood sugars this morning 82.  Insulin held.  





09/22/2020


Blood pressures 139/67


Blood sugars 107 and 145 today.


Tinea to groin and buttocks.  Lotrimin topically started. 


WBC 16.7 likely due to IV decadron.  No bandemia or signs of symptoms of an 

infective process.


BUN 52


CRE 2.0


GFR 32


Nothing acute appreciated on chest xray.





09/24/20


Pt stable without any changes.  


Decreased cough noted


Awaiting placement to [place] on Monday


Isolation precautions removed.


Vital signs trend


-Blood pressure:  105-148/58-90


-T-max:  98.2


-Heart rate:  71-95


-Pulse ox 94-98% on room air


Lab Results


no labs drawn today


PLAN


-awaiting nursing home placement


-Repeat labs as needed


-PT and OT to continue to treat patient








09/25/20


Pt stable without any changes.  


Awaiting placement to [place] on 09/28/20





Vital signs trend


-Blood pressure:  125-147/80-99


-T-max:  98.1


-Heart rate:  


-Pulse ox 96-99% on room air


Lab Results


no labs drawn today


PLAN


-awaiting nursing home placement


-Repeat labs as needed


-PT and OT to continue to treat patient





09/26/20


Patient has remained stable


Nursing is reporting patient is less interactive and appears more confused


Vital signs trend


-Blood pressure: 252306/6289


-T-max 99.1 at 9 PM yesterday


-Heart rate 75-98


-Pulse ox greater than 91% on room air


Lab results


-WBC up from 15.4-18.28, no changes in differential


-GFR stable at 32


PLAN


-SNF placement


-Let me sleep protocol


-Start Seroquel low-dose tonight


-Continue Symbicort


-PRN albuterol


-Repeat labs as needed


-Monitor for hypoxemia


-Monitor urine output


-Renally dosed medications


-Supplementation for B12


-PRN Hydralazine


-Continue home insulin 


-Accu-checks before meals and bedtime


-Hypoglycemia protocol


-Hold PO meds


-Continue home omeprazole, statin, sertaline, metoprolol and amlodipine. 





9/27/2020


Nursing reports patient reports has been drinking minimal amount of amount of 

water


Vital signs trend


-Blood pressure 117-142/61-82


-T-max 99.1


-Heart rate 


-Pulse ox greater than 87% on room air


Lab results


WBC up from 18.28-18.64


Hemoglobin down from 13.4-12.9


Sodium up from 134-135


GFR down from 32-30


Glucose POC: 166-296





9/28/2020


-Doing much worse today


-Lethargic


-Positive for UTI, cultures pending


-Started on Rocephin


-Temp is 99.0.  Usual temp is 97.0.  Blood cultures ordered


-Taking po poorly.  


-Epistaxis to bilateral nares yesterday with rhinorockets placed.  Rhinorockets 

removed today.  No further epistaxis noted.


-Xarelto and ASA discontinued. TEDS ordered


Vital signs trend


-Blood pressure 110-147/74-84


-T-max 99.0


-Heart rate 


-Pulse ox 91-94% on room air


Lab results


WBC down from 20.62 to 17.75


Hemoglobin down from 12.9-12.3


Sodium up from 133-138


GFR up from 29-32


Glucose POC: 166-296


-Phos 2.4


-Mg 1.8


ABG's on room air


-pH 7.54


-pCO2 26.4


-pO2 61.0


-HCO3 22.5


UA


3+ blood


3+ nitrite


3+ leukocyte esterase


Urine WBC 20-30


Moderate urine mucous





09/29/20


-More alert today


-Swallow eval completed by speech therapy-recommend NPO


-Urine cultures susceptible to Rocephin


-Xarelto restarted due to history of A-fib


-Blood cultures pending


Vital signs trend


-Blood pressure 111-147/52-76


-T-max 101.1


-Heart rate 


-Pulse ox 90-96% on room air


Lab Results


-WBC down from 17.75-11.33


-K+ down from 4.3-3.7


-BUN down from 40-37


-Cre 2.0


-GFR 32





09/30/20


-More alert today


-Swallow eval completed by speech therapy-regular diet with nectar thick liquids


-Blood cultures no growth


Vital signs trend


-Blood pressure 113-146/57-74


-T-max 98.8


-Heart rate 


-Pulse ox 90-93 on room air


Lab Results


-WBC down from 11.33-10.56


-K+ up from 3.7-3.9


-BUN 37


-Cre 0.9 from 2.0


-GFR up from 32-34





10/01/20


-Labored breathing today-chest xray obtained


-Day 3 Rocephin


Vital signs trend


-Blood pressure 124-138/64-85


-T-max 98.2


-Heart rate 74-96


-Pulse ox 90-94 on room air


Lab Results


-WBC up from 10.56-10.88


-Hgb up from 10.9 to 12.6


-BUN 37


-Cre 2.0 up from 1.9


-GFR down to 32 from 34


-AST up to 70 from 40


-ALT up to 79 from 52





10/02/20


-Several diarrhea stools in the last 24 hours reported by nursing staff.  

Awaiting stool for c-diff.


-Day 4 Rocephin


Vital signs trend


-Blood pressure 132-140/53-83


-T-max 98.4


-Heart rate 75-86


-Pulse ox 92-94 on room air


Lab Results


-no labs collected today





























- Plan


Plan:: 








COVID-19


COPD (chronic obstructive pulmonary disease)


-Continue Symbicort


-PRN albuterol


-Repeat labs as needed


-Monitor for hypoxemia





Atrial fibrillation with controlled ventricular rate


-Continue metoprolol


-On xarelto


 


Acute on chronic renal failure 2/2 Volume depletion, improved


Hyponatremia, resolved


Iron deficiency anemia


B12 deficiency


-Monitor urine output


-Renally dosed medications





Hypertension


-Metoprolol


-Continue Lasix 20 mg daily


-Amlodipine 5 mg at night


-PRN Hydralazine





Diabetes mellitus, GsH0c-7.9%


-Accu-checks before breakfast and at bedtime


-Hypoglycemia protocol


-Hold PO meds





Gastroesophageal reflux disease


-Continue home omeprazole





Dyslipidemia


- Continue home statin





Hypoalbuminemia








Depression


-Continue home sertraline





Orthostatic, resolved





UTI (urinary tract infection)


-Continue Rocephin


-BC no growth.





PROPHYLAXIS


DVT- home Xarelto


GI- home omeprazole





CODE STATUS: FULL CODE





DISPOSITION


Awaiting placement at [facility] on Monday, October 5, 2020.

## 2020-10-03 NOTE — PCM.PN
- General Info


Date of Service: 10/03/20


Admission Dx/Problem (Free Text): 


                           Admission Diagnosis/Problem





Admission Diagnosis/Problem      Hypoxia








Subjective Update: 





Patient is doing well and has no complaints.  He states his appetite is 

improved.


Functional Status: Reports: Pain Controlled





- Review of Systems


General: Reports: No Symptoms


HEENT: Reports: No Symptoms


Pulmonary: Reports: No Symptoms


Cardiovascular: Reports: No Symptoms


Musculoskeletal: Reports: No Symptoms





- Patient Data


Vitals - Most Recent: 


                                Last Vital Signs











Temp  97.5 F   10/03/20 09:45


 


Pulse  80   10/03/20 09:45


 


Resp  20   10/03/20 09:45


 


BP  134/53 L  10/03/20 09:46


 


Pulse Ox  94 L  10/03/20 09:45








                                        





Orthostatic Blood Pressure [     71/53


Standing]                        


Orthostatic Blood Pressure [     100/71


Sitting]                         


Orthostatic Blood Pressure [     121/52


Supine]                          








Weight - Most Recent: 61.643 kg


I&O - Last 24 Hours: 


                                 Intake & Output











 10/02/20 10/03/20 10/03/20





 22:59 06:59 14:59


 


Intake Total 1260 360 


 


Output Total 202  


 


Balance 1058 360 











Lab Results Last 24 Hours: 


                         Laboratory Results - last 24 hr











  10/02/20 10/03/20 Range/Units





  20:43 06:15 


 


POC Glucose  376 H  170 H  ()  mg/dL











Richard Results Last 24 Hours: 


                                  Microbiology











 09/28/20 13:24 Aerobic Blood Culture - Preliminary





 Blood - Venous - Lab Draw    NO GROWTH AFTER 5 DAYS





 Anaerobic Blood Culture - Preliminary





    NO GROWTH AFTER 5 DAYS


 


 09/28/20 13:09 Aerobic Blood Culture - Preliminary





 Blood - Venous    NO GROWTH AFTER 5 DAYS





 Anaerobic Blood Culture - Preliminary





    NO GROWTH AFTER 5 DAYS


 


 09/28/20 17:12 Aerobic Blood Culture - Preliminary





 Blood - Venous    NO GROWTH AFTER 4 DAYS





 Anaerobic Blood Culture - Preliminary





    NO GROWTH AFTER 4 DAYS


 


 09/28/20 17:25 Aerobic Blood Culture - Preliminary





 Blood - Venous - Lab Draw    NO GROWTH AFTER 4 DAYS





 Anaerobic Blood Culture - Preliminary





    NO GROWTH AFTER 4 DAYS











Med Orders - Current: 


                               Current Medications





Acetaminophen (Tylenol)  325 mg PO Q4H PRN


   PRN Reason: Pain (Mild 1-3)/fever


   Last Admin: 09/28/20 17:40 Dose:  325 mg


   Documented by: 


Albuterol/Ipratropium (Duoneb 3.0-0.5 Mg/3 Ml)  3 ml INH Q4H PRN


   PRN Reason: Shortness of Breath


Amlodipine Besylate (Norvasc)  5 mg PO DAILY [facility]


   Last Admin: 10/03/20 09:46 Dose:  5 mg


   Documented by: 


Calcitriol (Rocaltrol)  0.25 mcg PO MOFR [facility]


   Last Admin: 10/02/20 08:45 Dose:  0.25 mcg


   Documented by: 


Cholecalciferol (Vitamin D3)  25 mcg PO DAILY [facility]


   Last Admin: 10/03/20 09:45 Dose:  25 mcg


   Documented by: 


Clotrimazole (Lotrimin Af 1% Crm)  0 gm TOP BID [facility]


   Last Admin: 10/03/20 09:47 Dose:  1 applic


   Documented by: 


Dextrose/Water (Dextrose 50% In Water)  50 ml IVPUSH ASDIRECTED PRN


   PRN Reason: Hypoglycemia


Ferrous Sulfate (Ferrous Sulfate)  324 mg PO Q48H [facility]


   Last Admin: 10/02/20 08:29 Dose:  324 mg


   Documented by: 


Fluticasone Propionate (Flonase)  0 gm NASBOTH DAILY [facility]


   Last Admin: 10/03/20 09:46 Dose:  1 spray


   Documented by: 


Furosemide (Lasix)  20 mg PO DAILY [facility]


   Last Admin: 10/03/20 09:46 Dose:  20 mg


   Documented by: 


Ceftriaxone Sodium 2 gm/ (Sodium Chloride)  100 mls @ 200 mls/hr IV Q24H [facility]


   Last Admin: 10/02/20 15:42 Dose:  200 mls/hr


   Documented by: 


Insulin Glargine (Lantus)  15 unit SUBCUT BEDTIME [facility]


   Last Admin: 10/02/20 21:37 Dose:  15 units


   Documented by: 


Metoprolol Succinate (Toprol Xl)  100 mg PO DAILY [facility]


   Last Admin: 10/03/20 09:45 Dose:  100 mg


   Documented by: 


Mometasone Furoate/Formoterol Fumar (Dulera 200-5 Mcg)  2 puff IH BID [facility]


   Last Admin: 10/03/20 08:59 Dose:  Not Given


   Documented by: 


Mupirocin (Bactroban Oint)  0 gm TOP TID [facility]


   Last Admin: 10/03/20 09:47 Dose:  1 applic


   Documented by: 


Ethacrynic Acid 50 (Mg **Ptom)  50 mg PO BID [facility]


   Last Admin: 10/03/20 09:40 Dose:  Not Given


   Documented by: 


Ondansetron HCl (Zofran)  4 mg IV Q6H PRN


   PRN Reason: Nausea/Vomiting


   Last Admin: 09/12/20 11:18 Dose:  4 mg


   Documented by: 


Pantoprazole Sodium (Protonix***)  40 mg PO DAILY [facility]


   Last Admin: 10/03/20 09:46 Dose:  40 mg


   Documented by: 


Quetiapine Fumarate (Seroquel)  12.5 mg PO BEDTIME [facility]


   Last Admin: 10/02/20 22:23 Dose:  12.5 mg


   Documented by: 


Rivaroxaban (Xarelto)  10 mg PO DAILY [facility]


   Last Admin: 10/03/20 09:46 Dose:  10 mg


   Documented by: 


Rosuvastatin Calcium (Crestor)  10 mg PO BEDTIME [facility]


   Last Admin: 10/02/20 21:36 Dose:  10 mg


   Documented by: 


Senna/Docusate Sodium (Senna Plus)  1 tab PO DAILY [facility]


   Last Admin: 10/03/20 09:45 Dose:  1 tab


   Documented by: 


Sertraline HCl (Zoloft)  50 mg PO BEDTIME [facility]


   Last Admin: 10/02/20 21:36 Dose:  50 mg


   Documented by: 


Sodium Chloride (Ayr Saline Nasal Gel)  0 gm RHONA TID PRN


   PRN Reason: Dryness


Sodium Chloride (Saline Flush)  10 ml FLUSH ONETIME PRN


   PRN Reason: IV FLUSH


   Last Admin: 09/27/20 10:05 Dose:  10 ml


   Documented by: 


Spironolactone (Aldactone)  25 mg PO DAILY [facility]


   Last Admin: 10/03/20 09:46 Dose:  25 mg


   Documented by: 


Tamsulosin HCl (Flomax)  0.8 mg PO BEDTIME [facility]


   Last Admin: 10/02/20 21:36 Dose:  0.8 mg


   Documented by: 





Discontinued Medications





Amlodipine Besylate (Norvasc)  5 mg PO BEDTIME [facility]


   Last Admin: 09/20/20 20:35 Dose:  5 mg


   Documented by: 


Amlodipine Besylate (Norvasc)  5 mg PO BEDTIME [facility]


   Last Admin: 09/27/20 21:08 Dose:  5 mg


   Documented by: 


Aspirin (Halfprin)  81 mg PO DAILY [facility]


   Last Admin: 09/28/20 10:34 Dose:  Not Given


   Documented by: 


Calcitriol (Rocaltrol)  0.25 mcg PO ONETIME ONE


   Stop: 09/11/20 13:01


   Last Admin: 09/11/20 13:09 Dose:  0.25 mcg


   Documented by: 


Dexamethasone (Dexamethasone)  6 mg IVPUSH Q24H [facility]


   Stop: 09/20/20 13:01


   Last Admin: 09/14/20 12:05 Dose:  6 mg


   Documented by: 


Dexamethasone (Dexamethasone)  6 mg PO Q24H [facility]


   Stop: 09/20/20 13:01


   Last Admin: 09/19/20 12:16 Dose:  6 mg


   Documented by: 


Sodium Chloride (Normal Saline)  500 mls @ 1,000 mls/hr IV .BOLUS ONE


   Stop: 09/11/20 04:18


   Last Admin: 09/11/20 04:05 Dose:  1,000 mls/hr


   Documented by: 


Sodium Chloride (Normal Saline)  500 mls @ 1,000 mls/hr IV .BOLUS ONE


   Stop: 09/11/20 05:56


   Last Admin: 09/11/20 05:34 Dose:  1,000 mls/hr


   Documented by: 


Remdesivir 100 mg/ Sodium (Chloride)  100 mls @ 100 mls/hr IV Q24H [facility]


   Stop: 09/15/20 13:59


   Last Admin: 09/15/20 12:38 Dose:  100 mls/hr


   Documented by: 


Remdesivir 200 mg/ Sodium (Chloride)  250 mls @ 250 mls/hr IV ONETIME ONE


   Stop: 09/11/20 13:59


   Last Admin: 09/11/20 13:05 Dose:  250 mls/hr


   Documented by: 


Sodium Chloride (Normal Saline)  100 mls @ 75 mls/hr IV ASDIRECTED [facility]


   Last Admin: 09/27/20 10:06 Dose:  75 mls/hr


   Documented by: 


Sodium Chloride (Normal Saline)  1,000 mls @ 100 mls/hr IV ASDIRECTED [facility]


   Stop: 09/27/20 21:29


   Last Admin: 09/27/20 14:05 Dose:  100 mls/hr


   Documented by: 


Ceftriaxone Sodium 2 gm/ (Sodium Chloride)  100 mls @ 200 mls/hr IV Q24H [facility]


   Last Admin: 09/28/20 10:14 Dose:  200 mls/hr


   Documented by: 


Lactated Ringer's (Ringers, Lactated)  1,000 mls @ 100 mls/hr IV ASDIRECTED [facility]


   Last Admin: 09/29/20 03:32 Dose:  100 mls/hr


   Documented by: 


Sodium Phosphate 30 mmole/ (Sodium Chloride)  260 mls @ 86 mls/hr IV ONETIME ONE


   Stop: 09/28/20 17:01


   Last Admin: 09/28/20 14:46 Dose:  86 mls/hr


   Documented by: 


Piperacillin Sod/Tazobactam (Sod 4.5 gm/ Sodium Chloride)  100 mls @ 200 mls/hr 

IV ONETIME ONE


   Stop: 09/29/20 08:59


   Last Admin: 09/29/20 08:39 Dose:  200 mls/hr


   Documented by: 


Piperacillin Sod/Tazobactam (Sod 4.5 gm/ Sodium Chloride)  100 mls @ 25 mls/hr 

IV Q8H [facility]


Potassium Chloride 10 meq/ (Premix)  100 mls @ 100 mls/hr IV Q1H [facility]


   Stop: 09/29/20 16:29


   Last Admin: 09/29/20 18:24 Dose:  100 mls/hr


   Documented by: 


Lactated Ringer's (Ringers, Lactated)  1,000 mls @ 75 mls/hr IV ASDIRECTED [facility]


   Last Admin: 09/30/20 08:57 Dose:  75 mls/hr


   Documented by: 


Insulin Glargine (Lantus)  10 unit SUBCUT BEDTIME [facility]


   Last Admin: 09/27/20 21:58 Dose:  10 units


   Documented by: 


Insulin Glargine (Lantus)  3 unit SUBCUT DAILY [facility]


   Last Admin: 09/21/20 08:07 Dose:  Not Given


   Documented by: 


Insulin Human Lispro (Humalog)  0 unit SUBCUT QIDACANDBED [facility]; Protocol


   Last Admin: 09/28/20 10:28 Dose:  1 units


   Documented by: 


Iopamidol (Isovue-370 (76%))  100 ml IVPUSH ONETIME ONE


   Stop: 09/27/20 09:31


   Last Admin: 09/27/20 10:05 Dose:  100 ml


   Documented by: 


Potassium Chloride (Klor-Con M20)  40 meq PO Q4H [facility]


   Stop: 09/23/20 16:01


   Last Admin: 09/23/20 17:09 Dose:  40 meq


   Documented by: 


Rivaroxaban (Xarelto)  10 mg PO BEDTIME [facility]


   Last Admin: 09/26/20 21:13 Dose:  10 mg


   Documented by: 











- Exam


General: Alert


HEENT: Pupils Equal, Mucous Membr. Moist/Pink


Neck: Supple


Lungs: Clear to Auscultation, Normal Respiratory Effort


Cardiovascular: Regular Rate, Regular Rhythm


GI/Abdominal Exam: Normal Bowel Sounds, Soft, Non-Tender, No Distention


Extremities: Normal Inspection, No Pedal Edema, Normal Capillary Refill


Skin: Warm, Dry, Intact


Neurological: No New Focal Deficit


Psy/Mental Status: Alert, Normal Affect, Normal Mood





Sepsis Event Note





- Evaluation


Sepsis Screening Result: No Definite Risk





- Focused Exam


Vital Signs: 


                                   Vital Signs











  Temp Pulse Resp BP Pulse Ox


 


 10/03/20 09:46     134/53 L 


 


 10/03/20 09:45  97.5 F  80  20  134/53 L  94 L


 


 10/03/20 02:32  98.1 F  72  17  134/61  96














- Problem List & Annotations


(1) Acute on chronic renal failure


SNOMED Code(s): 815400847


   Code(s): N17.9 - ACUTE KIDNEY FAILURE, UNSPECIFIED; N18.9 - CHRONIC KIDNEY 

DISEASE, UNSPECIFIED   Status: Acute   Priority: High   Current Visit: Yes   


Qualifiers: 


   Acute renal failure type: unspecified   Chronic kidney disease stage: 

unspecified stage   Qualified Code(s): N17.9 - Acute kidney failure, 

unspecified; N18.9 - Chronic kidney disease, unspecified   





(2) Atrial fibrillation with controlled ventricular rate


SNOMED Code(s): 03654754


   Code(s): I48.91 - UNSPECIFIED ATRIAL FIBRILLATION   Status: Chronic   

Priority: High   Current Visit: Yes   





(3) B12 deficiency


SNOMED Code(s): 581534485


   Code(s): E53.8 - DEFICIENCY OF OTHER SPECIFIED B GROUP VITAMINS   Status: 

Chronic   Priority: Medium   Current Visit: Yes   





(4) COVID-19


SNOMED Code(s): 802425958


   Code(s): U07.1 - COVID-19   Status: Acute   Priority: High   Current Visit: 

Yes   





(5) Congestive heart failure


SNOMED Code(s): 65953492


   Code(s): I50.9 - HEART FAILURE, UNSPECIFIED   Status: Acute   Current Visit: 

Yes   





(6) UTI (urinary tract infection)


SNOMED Code(s): 76333738


   Code(s): N39.0 - URINARY TRACT INFECTION, SITE NOT SPECIFIED   Status: Acute 

 Priority: High   Current Visit: Yes   


Qualifiers: 


   Urinary tract infection type: acute cystitis   Hematuria presence: with 

hematuria   Qualified Code(s): N30.01 - Acute cystitis with hematuria   





- Problem List Review


Problem List Initiated/Reviewed/Updated: Yes





- Assessment


Assessment:: 





9/11/20


1 day of weakness and low O2 sats


Lives in [place], + COVID patients in past week


Pulse ox on admission 89%


CXR with cardiomegaly only, no lung parenchyma changes


Orthostatic --> given 500ml IVF bolus--> still orthostatic--> 2nd bolus


Lab results:


- Hb 13.2, Hct 40.1


- Na 133, GFR 19, glucose 165


- Troponin negative


Heart rate on admission 98


EKG with a. fib., rate 84


RVR will likely increase O2 needs- COVID positive


GFR baseline 26, 19 on admission


Not on any nephrotoxic meds 


Decreased oral intake


Admission /81


Hypertension home management with metoprolol


Diabetes home management with glimepiride, sitagliptin and insulin


Glucose on admission 165


Smoked 1.5 ppd for 51 years; Quit in 2005 


PLAN


- Start Remdesivir


- Start Dexamethasone


- PRN albuterol


- Continue Symbicort


- Hold metoprolol due to orthostatic hypotension


- Monitor for hypoxemia


- Monitor urine output


- Renally dosed medications


- Repeat labs and replace electrolytes as needed  


- Hold metoprolol


- PRN Hydralazine


- Continue home insulin at same dose for now


- No premeals, adjust with new measurements as needed


- New HbA1c


- Accu-checks before meals and bedtime


- Hypoglycemia protocol


- Hold PO meds


- Continue home omeprazole


- Continue home statin


- Dietary consult


- Continue home sertraline


Patient will be admitted to med surg floor, will be started on Remdesivir and 

dexamethasone. 





9/12/20


No longer on oxygen supplementation since 1:30 PM on 9/11/2020


Vital signs trend


Blood pressure: 117 264/50 3-91


T-max 98.2


Heart rate: 66-98


Pulse ox greater than 89%


Lab results


WBC down from 6.96-4.05


Hemoglobin down from 13.2-11.7


D-dimer stable from 0.72 2.71


Sodium down from 1 33-1 32


GFR up from 19-26


B12 87


BNP up from 9300-6288


CRP 5.2


Iron 26


Glucose -258


A1c 6.9%


PLAN


-Continue Remdesivir day 2 out of 5


-Continue dexamethasone day 2 out of 10


-Continue Symbicort


-PRN albuterol


-Repeat labs as needed


-Continue to hold metoprolol


-Monitor for hypoxemia


-Monitor urine output


-Renally dosed medications


-Repeat labs and replace electrolytes as needed  


-Hold metoprolol


-PRN Hydralazine


-Continue home insulin at same dose for now


-Accu-checks before meals and bedtime


-Hypoglycemia protocol


-Hold PO meds


-Continue home omeprazole


- Continue home statin


Patient will remain admitted for antiviral treatment for COVID.


Length of stay at least 5 days due to length of treatment required for COVID.





9/13/20


Shortness of breath has resolved


Has not required oxygen supplementation the past 24 hours


Vital signs trend


Blood pressure 769376/7289


T-max 98.4


Heart rate 66 to 88/min


Pulse ox greater than 97% on room air


New lab results


WBC up from 4.05-7.54


Hemoglobin up from 11.7-11.9


Platelets up from 193-235


D-dimer down from 0.71 2.5


Sodium stable from 1 


Chloride up from 94-96


GFR up from 26-30


Magnesium up from 1.9-2.1


CRP down from 5.2-2.8


Glucose trend 171-220





9/14/2020


Patient is asymptomatic.


Vital signs are stable and pulse ox is in the mid to upper 90s on room air.


CRP 2.0 and normal d-dimer.


Sodium up to 136.


BUN to creatinine ratio is still elevated at 30.0.


Estimated GFR is 30 and stable.  This is his chronic stable state.


Episode of blood sugar of 300 today.


Started sliding scale insulin.





9/15/2020


Patient is feeling well.


Vital signs stable with oxygenation in the mid upper 90s and afebrile.


Systolic blood pressure in the 160s today


Labs held today.


Blood sugars improved.


Awaiting placement.





9/16/2020


Patient is stable without any significant changes.


Blood pressure is ranging in the 150s up to 170s.  Afebrile.  Oxygen saturation 

on room air ranging from 90 to 100%, weight stable at approximately 66 kg


Hemoglobin and white count are stable at 12.0 and 7.9 respectively.


GFR 36 with creatinine of 1.8 which is improved.


BUN stable at 62.  Elevation likely secondary to protein catabolism from 

dexamethasone.


Blood sugars improved ranging from 120 up to 262


Still awaiting placement


Patient has not been getting his ethacrynic acid because it is nonformulary.





9/17/2020


Blood pressure continues to be elevated.  He was started on low-dose Lasix this 

morning.  He would likely benefit from additional treatment.


Improvement in glucose. But continues to be elevated.  This should resolve once 

he is off of dexamethasone.


No significant change overnight.





9/18/2020


Blood pressure is improved with the addition of amlodipine.


Patient has no complaints.


Glucose continues to be elevated secondary to dexamethasone





9/19/2020


Blood pressure is improved but continues to be elevated.


Blood pressure ranges from 122//56


Fingerstick blood sugars also continue to be elevated.


Still off oxygen and generally doing either better or stable on all parameters.





9/20/2020


Blood pressure improved with the last few systolic blood pressures between 149 

and 155


Blood sugars continue to be elevated likely secondary to dexamethasone


Estimated GFR 34.


BUN and creatinine both stable at 61 and 1.9, respectively





09/21/2020


Blood pressures 148/45.


Dexamethasone discontinued yesterday. 


Blood sugars this morning 82.  Insulin held.  





09/22/2020


Blood pressures 139/67


Blood sugars 107 and 145 today.


Tinea to groin and buttocks.  Lotrimin topically started. 


WBC 16.7 likely due to IV decadron.  No bandemia or signs of symptoms of an 

infective process.


BUN 52


CRE 2.0


GFR 32


Nothing acute appreciated on chest xray.





09/24/20


Pt stable without any changes.  


Decreased cough noted


Awaiting placement to [facility] on Monday


Isolation precautions removed.


Vital signs trend


-Blood pressure:  105-148/58-90


-T-max:  98.2


-Heart rate:  71-95


-Pulse ox 94-98% on room air


Lab Results


no labs drawn today


PLAN


-awaiting nursing home placement


-Repeat labs as needed


-PT and OT to continue to treat patient








09/25/20


Pt stable without any changes.  


Awaiting placement to [facility] on 09/28/20





Vital signs trend


-Blood pressure:  125-147/80-99


-T-max:  98.1


-Heart rate:  


-Pulse ox 96-99% on room air


Lab Results


no labs drawn today


PLAN


-awaiting nursing home placement


-Repeat labs as needed


-PT and OT to continue to treat patient





09/26/20


Patient has remained stable


Nursing is reporting patient is less interactive and appears more confused


Vital signs trend


-Blood pressure: 161947/6289


-T-max 99.1 at 9 PM yesterday


-Heart rate 75-98


-Pulse ox greater than 91% on room air


Lab results


-WBC up from 15.4-18.28, no changes in differential


-GFR stable at 32


PLAN


-SNF placement


-Let me sleep protocol


-Start Seroquel low-dose tonight


-Continue Symbicort


-PRN albuterol


-Repeat labs as needed


-Monitor for hypoxemia


-Monitor urine output


-Renally dosed medications


-Supplementation for B12


-PRN Hydralazine


-Continue home insulin 


-Accu-checks before meals and bedtime


-Hypoglycemia protocol


-Hold PO meds


-Continue home omeprazole, statin, sertaline, metoprolol and amlodipine. 





9/27/2020


Nursing reports patient reports has been drinking minimal amount of amount of 

water


Vital signs trend


-Blood pressure 117-142/61-82


-T-max 99.1


-Heart rate 


-Pulse ox greater than 87% on room air


Lab results


WBC up from 18.28-18.64


Hemoglobin down from 13.4-12.9


Sodium up from 134-135


GFR down from 32-30


Glucose POC: 166-296





9/28/2020


-Doing much worse today


-Lethargic


-Positive for UTI, cultures pending


-Started on Rocephin


-Temp is 99.0.  Usual temp is 97.0.  Blood cultures ordered


-Taking po poorly.  


-Epistaxis to bilateral nares yesterday with rhinorockets placed.  Rhinorockets 

removed today.  No further epistaxis noted.


-Xarelto and ASA discontinued. TEDS ordered


Vital signs trend


-Blood pressure 110-147/74-84


-T-max 99.0


-Heart rate 


-Pulse ox 91-94% on room air


Lab results


WBC down from 20.62 to 17.75


Hemoglobin down from 12.9-12.3


Sodium up from 133-138


GFR up from 29-32


Glucose POC: 166-296


-Phos 2.4


-Mg 1.8


ABG's on room air


-pH 7.54


-pCO2 26.4


-pO2 61.0


-HCO3 22.5


UA


3+ blood


3+ nitrite


3+ leukocyte esterase


Urine WBC 20-30


Moderate urine mucous





09/29/20


-More alert today


-Swallow eval completed by speech therapy-recommend NPO


-Urine cultures susceptible to Rocephin


-Xarelto restarted due to history of A-fib


-Blood cultures pending


Vital signs trend


-Blood pressure 111-147/52-76


-T-max 101.1


-Heart rate 


-Pulse ox 90-96% on room air


Lab Results


-WBC down from 17.75-11.33


-K+ down from 4.3-3.7


-BUN down from 40-37


-Cre 2.0


-GFR 32





09/30/20


-More alert today


-Swallow eval completed by speech therapy-regular diet with nectar thick liquids


-Blood cultures no growth


Vital signs trend


-Blood pressure 113-146/57-74


-T-max 98.8


-Heart rate 


-Pulse ox 90-93 on room air


Lab Results


-WBC down from 11.33-10.56


-K+ up from 3.7-3.9


-BUN 37


-Cre 0.9 from 2.0


-GFR up from 32-34





10/01/20


-Labored breathing today-chest xray obtained


-Day 3 Rocephin


Vital signs trend


-Blood pressure 124-138/64-85


-T-max 98.2


-Heart rate 74-96


-Pulse ox 90-94 on room air


Lab Results


-WBC up from 10.56-10.88


-Hgb up from 10.9 to 12.6


-BUN 37


-Cre 2.0 up from 1.9


-GFR down to 32 from 34


-AST up to 70 from 40


-ALT up to 79 from 52





10/02/20


-Several diarrhea stools in the last 24 hours reported by nursing staff.  

Awaiting stool for c-diff.


-Day 4 Rocephin


Vital signs trend


-Blood pressure 132-140/53-83


-T-max 98.4


-Heart rate 75-86


-Pulse ox 92-94 on room air


Lab Results


-no labs collected today





10/3/2020


Diarrhea has resolved.


Counseled C. difficile stool antigen testing.


Currently on IV Rocephin


Vital signs stable, afebrile


No labs collected today.





- Plan


Plan:: 








COVID-19


COPD (chronic obstructive pulmonary disease)


-Continue Symbicort


-PRN albuterol


-Repeat labs as needed


-Monitor for hypoxemia





Atrial fibrillation with controlled ventricular rate


-Continue metoprolol


-On xarelto


 


Acute on chronic renal failure 2/2 Volume depletion, improved


Hyponatremia, resolved


Iron deficiency anemia


B12 deficiency


-Monitor urine output


-Renally dosed medications





Hypertension


-Metoprolol


-Continue Lasix 20 mg daily


-Amlodipine 5 mg at night


-PRN Hydralazine





Diabetes mellitus, YeM9g-8.9%


-Accu-checks before breakfast and at bedtime


-Hypoglycemia protocol


-Hold PO meds





Gastroesophageal reflux disease


-Continue home omeprazole





Dyslipidemia


- Continue home statin





Hypoalbuminemia








Depression


-Continue home sertraline





Orthostatic, resolved





UTI (urinary tract infection)


-Switch from Rocephin to Keflex


-BC no growth.





PROPHYLAXIS


DVT- home Xarelto


GI- home omeprazole





CODE STATUS: FULL CODE





DISPOSITION


Awaiting placement at [facility] on Monday, October 5, 2020.

## 2020-10-04 NOTE — PCM.PN
- General Info


Date of Service: 10/04/20


Admission Dx/Problem (Free Text): 


                           Admission Diagnosis/Problem





Admission Diagnosis/Problem      Hypoxia








Subjective Update: 





Patient is afebrile without any complaints.  Appetite is mediocre.  He had poor 

intake yesterday.


Functional Status: Reports: Pain Controlled





- Review of Systems


General: Reports: No Symptoms


HEENT: Reports: No Symptoms


Pulmonary: Reports: No Symptoms


Cardiovascular: Reports: No Symptoms


Gastrointestinal: Reports: Decreased Appetite





- Patient Data


Vitals - Most Recent: 


                                Last Vital Signs











Temp  95.7 F L  10/04/20 09:33


 


Pulse  72   10/04/20 09:36


 


Resp  18   10/04/20 09:33


 


BP  130/61   10/04/20 09:36


 


Pulse Ox  91 L  10/04/20 09:33








                                        





Orthostatic Blood Pressure [     71/53


Standing]                        


Orthostatic Blood Pressure [     100/71


Sitting]                         


Orthostatic Blood Pressure [     121/52


Supine]                          








Weight - Most Recent: 61.552 kg


I&O - Last 24 Hours: 


                                 Intake & Output











 10/03/20 10/04/20 10/04/20





 22:59 06:59 14:59


 


Intake Total 840 240 


 


Output Total 100  


 


Balance 740 240 











Lab Results Last 24 Hours: 


                         Laboratory Results - last 24 hr











  10/03/20 10/03/20 10/03/20 Range/Units





  19:57 21:26 22:35 


 


WBC     (4.23-9.07)  K/mm3


 


RBC     (4.63-6.08)  M/mm3


 


Hgb     (13.7-17.5)  gm/dl


 


Hct     (40.1-51.0)  %


 


MCV     (79.0-92.2)  fl


 


MCH     (25.7-32.2)  pg


 


MCHC     (32.2-35.5)  g/dl


 


RDW Std Deviation     (35.1-43.9)  fL


 


Plt Count     (163-337)  K/mm3


 


MPV     (9.4-12.3)  fl


 


Neut % (Auto)     (34.0-67.9)  %


 


Lymph % (Auto)     (21.8-53.1)  %


 


Mono % (Auto)     (5.3-12.2)  %


 


Eos % (Auto)     (0.8-7.0)  


 


Baso % (Auto)     (0.1-1.2)  %


 


Neut # (Auto)     (1.78-5.38)  K/mm3


 


Lymph # (Auto)     (1.32-3.57)  K/mm3


 


Mono # (Auto)     (0.30-0.82)  K/mm3


 


Eos # (Auto)     (0.04-0.54)  K/mm3


 


Baso # (Auto)     (0.01-0.08)  K/mm3


 


Manual Slide Review     


 


Sodium     (136-145)  mEq/L


 


Potassium     (3.5-5.1)  mEq/L


 


Chloride     ()  mEq/L


 


Carbon Dioxide     (21-32)  mEq/L


 


Anion Gap     (5-15)  


 


BUN     (7-18)  mg/dL


 


Creatinine     (0.7-1.3)  mg/dL


 


Est Cr Clr Drug Dosing     mL/min


 


Estimated GFR (MDRD)     (>60)  mL/min


 


BUN/Creatinine Ratio     (14-18)  


 


Glucose     ()  mg/dL


 


POC Glucose  322 H  272 H  108  ()  mg/dL


 


Calcium     (8.5-10.1)  mg/dL


 


Total Bilirubin     (0.2-1.0)  mg/dL


 


AST     (15-37)  U/L


 


ALT     (16-63)  U/L


 


Alkaline Phosphatase     ()  U/L


 


C-Reactive Protein     (<1.0)  mg/dL


 


Total Protein     (6.4-8.2)  g/dl


 


Albumin     (3.4-5.0)  g/dl


 


Globulin     gm/dL


 


Albumin/Globulin Ratio     (1-2)  














  10/04/20 10/04/20 10/04/20 Range/Units





  01:56 07:01 07:01 


 


WBC   10.41 H   (4.23-9.07)  K/mm3


 


RBC   4.22 L   (4.63-6.08)  M/mm3


 


Hgb   12.4 L   (13.7-17.5)  gm/dl


 


Hct   37.6 L   (40.1-51.0)  %


 


MCV   89.1   (79.0-92.2)  fl


 


MCH   29.4   (25.7-32.2)  pg


 


MCHC   33.0   (32.2-35.5)  g/dl


 


RDW Std Deviation   43.6   (35.1-43.9)  fL


 


Plt Count   413 H D   (163-337)  K/mm3


 


MPV   8.9 L   (9.4-12.3)  fl


 


Neut % (Auto)   75.4 H   (34.0-67.9)  %


 


Lymph % (Auto)   10.4 L   (21.8-53.1)  %


 


Mono % (Auto)   5.3   (5.3-12.2)  %


 


Eos % (Auto)   5.6   (0.8-7.0)  


 


Baso % (Auto)   0.4   (0.1-1.2)  %


 


Neut # (Auto)   7.86 H   (1.78-5.38)  K/mm3


 


Lymph # (Auto)   1.08 L   (1.32-3.57)  K/mm3


 


Mono # (Auto)   0.55   (0.30-0.82)  K/mm3


 


Eos # (Auto)   0.58 H   (0.04-0.54)  K/mm3


 


Baso # (Auto)   0.04   (0.01-0.08)  K/mm3


 


Manual Slide Review   Normal smear   


 


Sodium    137  (136-145)  mEq/L


 


Potassium    4.4  (3.5-5.1)  mEq/L


 


Chloride    102  ()  mEq/L


 


Carbon Dioxide    29  (21-32)  mEq/L


 


Anion Gap    10.4  (5-15)  


 


BUN    44 H  (7-18)  mg/dL


 


Creatinine    1.9 H  (0.7-1.3)  mg/dL


 


Est Cr Clr Drug Dosing    26.55  mL/min


 


Estimated GFR (MDRD)    34  (>60)  mL/min


 


BUN/Creatinine Ratio    23.2 H  (14-18)  


 


Glucose    125 H  ()  mg/dL


 


POC Glucose  113 H    ()  mg/dL


 


Calcium    9.3  (8.5-10.1)  mg/dL


 


Total Bilirubin    0.4  (0.2-1.0)  mg/dL


 


AST    44 H  (15-37)  U/L


 


ALT    88 H  (16-63)  U/L


 


Alkaline Phosphatase    82  ()  U/L


 


C-Reactive Protein    3.0 H*  (<1.0)  mg/dL


 


Total Protein    7.2  (6.4-8.2)  g/dl


 


Albumin    2.2 L  (3.4-5.0)  g/dl


 


Globulin    5.0  gm/dL


 


Albumin/Globulin Ratio    0.4 L  (1-2)  














  10/04/20 Range/Units





  07:20 


 


WBC   (4.23-9.07)  K/mm3


 


RBC   (4.63-6.08)  M/mm3


 


Hgb   (13.7-17.5)  gm/dl


 


Hct   (40.1-51.0)  %


 


MCV   (79.0-92.2)  fl


 


MCH   (25.7-32.2)  pg


 


MCHC   (32.2-35.5)  g/dl


 


RDW Std Deviation   (35.1-43.9)  fL


 


Plt Count   (163-337)  K/mm3


 


MPV   (9.4-12.3)  fl


 


Neut % (Auto)   (34.0-67.9)  %


 


Lymph % (Auto)   (21.8-53.1)  %


 


Mono % (Auto)   (5.3-12.2)  %


 


Eos % (Auto)   (0.8-7.0)  


 


Baso % (Auto)   (0.1-1.2)  %


 


Neut # (Auto)   (1.78-5.38)  K/mm3


 


Lymph # (Auto)   (1.32-3.57)  K/mm3


 


Mono # (Auto)   (0.30-0.82)  K/mm3


 


Eos # (Auto)   (0.04-0.54)  K/mm3


 


Baso # (Auto)   (0.01-0.08)  K/mm3


 


Manual Slide Review   


 


Sodium   (136-145)  mEq/L


 


Potassium   (3.5-5.1)  mEq/L


 


Chloride   ()  mEq/L


 


Carbon Dioxide   (21-32)  mEq/L


 


Anion Gap   (5-15)  


 


BUN   (7-18)  mg/dL


 


Creatinine   (0.7-1.3)  mg/dL


 


Est Cr Clr Drug Dosing   mL/min


 


Estimated GFR (MDRD)   (>60)  mL/min


 


BUN/Creatinine Ratio   (14-18)  


 


Glucose   ()  mg/dL


 


POC Glucose  117 H  ()  mg/dL


 


Calcium   (8.5-10.1)  mg/dL


 


Total Bilirubin   (0.2-1.0)  mg/dL


 


AST   (15-37)  U/L


 


ALT   (16-63)  U/L


 


Alkaline Phosphatase   ()  U/L


 


C-Reactive Protein   (<1.0)  mg/dL


 


Total Protein   (6.4-8.2)  g/dl


 


Albumin   (3.4-5.0)  g/dl


 


Globulin   gm/dL


 


Albumin/Globulin Ratio   (1-2)  











Richard Results Last 24 Hours: 


                                  Microbiology











 09/28/20 13:24 Aerobic Blood Culture - Preliminary





 Blood - Venous - Lab Draw    NO GROWTH AFTER 6 DAYS





 Anaerobic Blood Culture - Preliminary





    NO GROWTH AFTER 6 DAYS


 


 09/28/20 13:09 Aerobic Blood Culture - Preliminary





 Blood - Venous    NO GROWTH AFTER 6 DAYS





 Anaerobic Blood Culture - Preliminary





    NO GROWTH AFTER 6 DAYS


 


 09/28/20 17:12 Aerobic Blood Culture - Preliminary





 Blood - Venous    NO GROWTH AFTER 5 DAYS





 Anaerobic Blood Culture - Preliminary





    NO GROWTH AFTER 5 DAYS


 


 09/28/20 17:25 Aerobic Blood Culture - Preliminary





 Blood - Venous - Lab Draw    NO GROWTH AFTER 5 DAYS





 Anaerobic Blood Culture - Preliminary





    NO GROWTH AFTER 5 DAYS











Med Orders - Current: 


                               Current Medications





Acetaminophen (Tylenol)  325 mg PO Q4H PRN


   PRN Reason: Pain (Mild 1-3)/fever


   Last Admin: 09/28/20 17:40 Dose:  325 mg


   Documented by: 


Albuterol/Ipratropium (Duoneb 3.0-0.5 Mg/3 Ml)  3 ml INH Q4H PRN


   PRN Reason: Shortness of Breath


Amlodipine Besylate (Norvasc)  5 mg PO DAILY [facility]


   Last Admin: 10/04/20 09:35 Dose:  5 mg


   Documented by: 


Calcitriol (Rocaltrol)  0.25 mcg PO MOFR [facility]


   Last Admin: 10/02/20 08:45 Dose:  0.25 mcg


   Documented by: 


Cephalexin (Keflex)  500 mg PO Q12HR [facility]


   Stop: 10/06/20 21:01


   Last Admin: 10/04/20 09:35 Dose:  500 mg


   Documented by: 


Cholecalciferol (Vitamin D3)  25 mcg PO DAILY [facility]


   Last Admin: 10/04/20 09:35 Dose:  25 mcg


   Documented by: 


Clotrimazole (Lotrimin Af 1% Crm)  0 gm TOP BID [facility]


   Last Admin: 10/04/20 09:36 Dose:  1 applic


   Documented by: 


Dextrose/Water (Dextrose 50% In Water)  50 ml IVPUSH ASDIRECTED PRN


   PRN Reason: Hypoglycemia


Ferrous Sulfate (Ferrous Sulfate)  324 mg PO Q48H [facility]


   Last Admin: 10/04/20 09:35 Dose:  324 mg


   Documented by: 


Fluticasone Propionate (Flonase)  0 gm NASBOTH DAILY [facility]


   Last Admin: 10/04/20 09:36 Dose:  1 spray


   Documented by: 


Furosemide (Lasix)  20 mg PO DAILY [facility]


   Last Admin: 10/04/20 09:35 Dose:  20 mg


   Documented by: 


Insulin Glargine (Lantus)  15 unit SUBCUT BEDTIME [facility]


   Last Admin: 10/03/20 22:48 Dose:  15 units


   Documented by: 


Metoprolol Succinate (Toprol Xl)  100 mg PO DAILY [facility]


   Last Admin: 10/04/20 09:36 Dose:  100 mg


   Documented by: 


Mometasone Furoate/Formoterol Fumar (Dulera 200-5 Mcg)  2 puff IH BID [facility]


   Last Admin: 10/04/20 08:49 Dose:  2 puff


   Documented by: 


Mupirocin (Bactroban Oint)  0 gm TOP TID [facility]


   Last Admin: 10/04/20 09:37 Dose:  1 applic


   Documented by: 


Ethacrynic Acid 50 (Mg **Ptom)  50 mg PO BID [facility]


   Last Admin: 10/04/20 09:37 Dose:  Not Given


   Documented by: 


Ondansetron HCl (Zofran)  4 mg IV Q6H PRN


   PRN Reason: Nausea/Vomiting


   Last Admin: 09/12/20 11:18 Dose:  4 mg


   Documented by: 


Pantoprazole Sodium (Protonix***)  40 mg PO DAILY [facility]


   Last Admin: 10/04/20 09:36 Dose:  40 mg


   Documented by: 


Quetiapine Fumarate (Seroquel)  12.5 mg PO BEDTIME [facility]


   Last Admin: 10/03/20 20:04 Dose:  12.5 mg


   Documented by: 


Rivaroxaban (Xarelto)  10 mg PO DAILY [facility]


   Last Admin: 10/04/20 09:36 Dose:  10 mg


   Documented by: 


Rosuvastatin Calcium (Crestor)  10 mg PO BEDTIME [facility]


   Last Admin: 10/03/20 20:05 Dose:  10 mg


   Documented by: 


Senna/Docusate Sodium (Senna Plus)  1 tab PO DAILY [facility]


   Last Admin: 10/04/20 09:36 Dose:  1 tab


   Documented by: 


Sertraline HCl (Zoloft)  50 mg PO BEDTIME [facility]


   Last Admin: 10/03/20 20:05 Dose:  50 mg


   Documented by: 


Sodium Chloride ([place] Saline Nasal Gel)  0 gm RHONA TID PRN


   PRN Reason: Dryness


Sodium Chloride (Saline Flush)  10 ml FLUSH ONETIME PRN


   PRN Reason: IV FLUSH


   Last Admin: 09/27/20 10:05 Dose:  10 ml


   Documented by: 


Spironolactone (Aldactone)  25 mg PO DAILY [facility]


   Last Admin: 10/04/20 09:35 Dose:  25 mg


   Documented by: 


Tamsulosin HCl (Flomax)  0.8 mg PO BEDTIME [facility]


   Last Admin: 10/03/20 20:03 Dose:  0.8 mg


   Documented by: 





Discontinued Medications





Amlodipine Besylate (Norvasc)  5 mg PO BEDTIME [facility]


   Last Admin: 09/20/20 20:35 Dose:  5 mg


   Documented by: 


Amlodipine Besylate (Norvasc)  5 mg PO BEDTIME [facility]


   Last Admin: 09/27/20 21:08 Dose:  5 mg


   Documented by: 


Aspirin (Halfprin)  81 mg PO DAILY [facility]


   Last Admin: 09/28/20 10:34 Dose:  Not Given


   Documented by: 


Calcitriol (Rocaltrol)  0.25 mcg PO ONETIME ONE


   Stop: 09/11/20 13:01


   Last Admin: 09/11/20 13:09 Dose:  0.25 mcg


   Documented by: 


Dexamethasone (Dexamethasone)  6 mg IVPUSH Q24H [facility]


   Stop: 09/20/20 13:01


   Last Admin: 09/14/20 12:05 Dose:  6 mg


   Documented by: 


Dexamethasone (Dexamethasone)  6 mg PO Q24H [facility]


   Stop: 09/20/20 13:01


   Last Admin: 09/19/20 12:16 Dose:  6 mg


   Documented by: 


Sodium Chloride (Normal Saline)  500 mls @ 1,000 mls/hr IV .BOLUS ONE


   Stop: 09/11/20 04:18


   Last Admin: 09/11/20 04:05 Dose:  1,000 mls/hr


   Documented by: 


Sodium Chloride (Normal Saline)  500 mls @ 1,000 mls/hr IV .BOLUS ONE


   Stop: 09/11/20 05:56


   Last Admin: 09/11/20 05:34 Dose:  1,000 mls/hr


   Documented by: 


Remdesivir 100 mg/ Sodium (Chloride)  100 mls @ 100 mls/hr IV Q24H [facility]


   Stop: 09/15/20 13:59


   Last Admin: 09/15/20 12:38 Dose:  100 mls/hr


   Documented by: 


Remdesivir 200 mg/ Sodium (Chloride)  250 mls @ 250 mls/hr IV ONETIME ONE


   Stop: 09/11/20 13:59


   Last Admin: 09/11/20 13:05 Dose:  250 mls/hr


   Documented by: 


Sodium Chloride (Normal Saline)  100 mls @ 75 mls/hr IV ASDIRECTED [facility]


   Last Admin: 09/27/20 10:06 Dose:  75 mls/hr


   Documented by: 


Sodium Chloride (Normal Saline)  1,000 mls @ 100 mls/hr IV ASDIRECTED [facility]


   Stop: 09/27/20 21:29


   Last Admin: 09/27/20 14:05 Dose:  100 mls/hr


   Documented by: 


Ceftriaxone Sodium 2 gm/ (Sodium Chloride)  100 mls @ 200 mls/hr IV Q24H [facility]


   Last Admin: 09/28/20 10:14 Dose:  200 mls/hr


   Documented by: 


Lactated Ringer's (Ringers, Lactated)  1,000 mls @ 100 mls/hr IV ASDIRECTED [facility]


   Last Admin: 09/29/20 03:32 Dose:  100 mls/hr


   Documented by: 


Sodium Phosphate 30 mmole/ (Sodium Chloride)  260 mls @ 86 mls/hr IV ONETIME ONE


   Stop: 09/28/20 17:01


   Last Admin: 09/28/20 14:46 Dose:  86 mls/hr


   Documented by: 


Piperacillin Sod/Tazobactam (Sod 4.5 gm/ Sodium Chloride)  100 mls @ 200 mls/hr 

IV ONETIME ONE


   Stop: 09/29/20 08:59


   Last Admin: 09/29/20 08:39 Dose:  200 mls/hr


   Documented by: 


Piperacillin Sod/Tazobactam (Sod 4.5 gm/ Sodium Chloride)  100 mls @ 25 mls/hr 

IV Q8H [facility]


Ceftriaxone Sodium 2 gm/ (Sodium Chloride)  100 mls @ 200 mls/hr IV Q24H [facility]


   Last Admin: 10/02/20 15:42 Dose:  200 mls/hr


   Documented by: 


Potassium Chloride 10 meq/ (Premix)  100 mls @ 100 mls/hr IV Q1H [facility]


   Stop: 09/29/20 16:29


   Last Admin: 09/29/20 18:24 Dose:  100 mls/hr


   Documented by: 


Lactated Ringer's (Ringers, Lactated)  1,000 mls @ 75 mls/hr IV ASDIRECTED [facility]


   Last Admin: 09/30/20 08:57 Dose:  75 mls/hr


   Documented by: 


Insulin Glargine (Lantus)  10 unit SUBCUT BEDTIME [facility]


   Last Admin: 09/27/20 21:58 Dose:  10 units


   Documented by: 


Insulin Glargine (Lantus)  3 unit SUBCUT DAILY [facility]


   Last Admin: 09/21/20 08:07 Dose:  Not Given


   Documented by: 


Insulin Human Lispro (Humalog)  0 unit SUBCUT QIDACANDBED [facility]; Protocol


   Last Admin: 09/28/20 10:28 Dose:  1 units


   Documented by: 


Iopamidol (Isovue-370 (76%))  100 ml IVPUSH ONETIME ONE


   Stop: 09/27/20 09:31


   Last Admin: 09/27/20 10:05 Dose:  100 ml


   Documented by: 


Potassium Chloride (Klor-Con M20)  40 meq PO Q4H [facility]


   Stop: 09/23/20 16:01


   Last Admin: 09/23/20 17:09 Dose:  40 meq


   Documented by: 


Rivaroxaban (Xarelto)  10 mg PO BEDTIME [facility]


   Last Admin: 09/26/20 21:13 Dose:  10 mg


   Documented by: 











- Exam


General: Alert


HEENT: Pupils Equal, Mucous Membr. Moist/Pink


Neck: Supple


Lungs: Clear to Auscultation, Normal Respiratory Effort


Cardiovascular: Regular Rate, Regular Rhythm


GI/Abdominal Exam: Normal Bowel Sounds, Soft, Non-Tender, No Abnormal Bruit


Back Exam: Normal Inspection


Extremities: Normal Inspection, No Pedal Edema, Normal Capillary Refill





Sepsis Event Note





- Evaluation


Sepsis Screening Result: No Definite Risk





- Focused Exam


Vital Signs: 


                                   Vital Signs











  Temp Pulse Resp BP Pulse Ox Pulse Ox


 


 10/04/20 09:36   72   130/61  


 


 10/04/20 09:35     130/61  


 


 10/04/20 09:33  95.7 F L  72  18  130/61  91 L 


 


 10/04/20 08:53       95


 


 10/04/20 08:50       95


 


 10/04/20 05:50  98.1 F  67  13  120/67  93 L 














- Problem List & Annotations


(1) Acute on chronic renal failure


SNOMED Code(s): 785216745


   Code(s): N17.9 - ACUTE KIDNEY FAILURE, UNSPECIFIED; N18.9 - CHRONIC KIDNEY 

DISEASE, UNSPECIFIED   Status: Acute   Priority: High   Current Visit: Yes   


Qualifiers: 


   Acute renal failure type: unspecified   Chronic kidney disease stage: 

unspecified stage   Qualified Code(s): N17.9 - Acute kidney failure, 

unspecified; N18.9 - Chronic kidney disease, unspecified   





(2) Atrial fibrillation with controlled ventricular rate


SNOMED Code(s): 41284449


   Code(s): I48.91 - UNSPECIFIED ATRIAL FIBRILLATION   Status: Chronic   

Priority: High   Current Visit: Yes   





(3) B12 deficiency


SNOMED Code(s): 601331344


   Code(s): E53.8 - DEFICIENCY OF OTHER SPECIFIED B GROUP VITAMINS   Status: 

Chronic   Priority: Medium   Current Visit: Yes   





(4) COVID-19


SNOMED Code(s): 999784556


   Code(s): U07.1 - COVID-19   Status: Acute   Priority: High   Current Visit: 

Yes   





(5) Congestive heart failure


SNOMED Code(s): 12520521


   Code(s): I50.9 - HEART FAILURE, UNSPECIFIED   Status: Acute   Current Visit: 

Yes   





(6) UTI (urinary tract infection)


SNOMED Code(s): 75862664


   Code(s): N39.0 - URINARY TRACT INFECTION, SITE NOT SPECIFIED   Status: Acute 

 Priority: High   Current Visit: Yes   


Qualifiers: 


   Urinary tract infection type: acute cystitis   Hematuria presence: with 

hematuria   Qualified Code(s): N30.01 - Acute cystitis with hematuria   





- Problem List Review


Problem List Initiated/Reviewed/Updated: Yes





- My Orders


Last 24 Hours: 


My Active Orders





10/03/20 20:00


Communication Order [RC] ASDIRECTED 





10/03/20 21:00


cephALEXin [Keflex]   500 mg PO Q12HR 














- Assessment


Assessment:: 





9/11/20


1 day of weakness and low O2 sats


Lives in [place], + COVID patients in past week


Pulse ox on admission 89%


CXR with cardiomegaly only, no lung parenchyma changes


Orthostatic --> given 500ml IVF bolus--> still orthostatic--> 2nd bolus


Lab results:


- Hb 13.2, Hct 40.1


- Na 133, GFR 19, glucose 165


- Troponin negative


Heart rate on admission 98


EKG with a. fib., rate 84


RVR will likely increase O2 needs- COVID positive


GFR baseline 26, 19 on admission


Not on any nephrotoxic meds 


Decreased oral intake


Admission /81


Hypertension home management with metoprolol


Diabetes home management with glimepiride, sitagliptin and insulin


Glucose on admission 165


Smoked 1.5 ppd for 51 years; Quit in 2005 


PLAN


- Start Remdesivir


- Start Dexamethasone


- PRN albuterol


- Continue Symbicort


- Hold metoprolol due to orthostatic hypotension


- Monitor for hypoxemia


- Monitor urine output


- Renally dosed medications


- Repeat labs and replace electrolytes as needed  


- Hold metoprolol


- PRN Hydralazine


- Continue home insulin at same dose for now


- No premeals, adjust with new measurements as needed


- New HbA1c


- Accu-checks before meals and bedtime


- Hypoglycemia protocol


- Hold PO meds


- Continue home omeprazole


- Continue home statin


- Dietary consult


- Continue home sertraline


Patient will be admitted to med surg floor, will be started on Remdesivir and 

dexamethasone. 





9/12/20


No longer on oxygen supplementation since 1:30 PM on 9/11/2020


Vital signs trend


Blood pressure: 117 264/50 3-91


T-max 98.2


Heart rate: 66-98


Pulse ox greater than 89%


Lab results


WBC down from 6.96-4.05


Hemoglobin down from 13.2-11.7


D-dimer stable from 0.72 2.71


Sodium down from 1 33-1 32


GFR up from 19-26


B12 87


BNP up from 7440-4291


CRP 5.2


Iron 26


Glucose -258


A1c 6.9%


PLAN


-Continue Remdesivir day 2 out of 5


-Continue dexamethasone day 2 out of 10


-Continue Symbicort


-PRN albuterol


-Repeat labs as needed


-Continue to hold metoprolol


-Monitor for hypoxemia


-Monitor urine output


-Renally dosed medications


-Repeat labs and replace electrolytes as needed  


-Hold metoprolol


-PRN Hydralazine


-Continue home insulin at same dose for now


-Accu-checks before meals and bedtime


-Hypoglycemia protocol


-Hold PO meds


-Continue home omeprazole


- Continue home statin


Patient will remain admitted for antiviral treatment for COVID.


Length of stay at least 5 days due to length of treatment required for COVID.





9/13/20


Shortness of breath has resolved


Has not required oxygen supplementation the past 24 hours


Vital signs trend


Blood pressure 486781/7289


T-max 98.4


Heart rate 66 to 88/min


Pulse ox greater than 97% on room air


New lab results


WBC up from 4.05-7.54


Hemoglobin up from 11.7-11.9


Platelets up from 193-235


D-dimer down from 0.71 2.5


Sodium stable from 1 


Chloride up from 94-96


GFR up from 26-30


Magnesium up from 1.9-2.1


CRP down from 5.2-2.8


Glucose trend 171-220





9/14/2020


Patient is asymptomatic.


Vital signs are stable and pulse ox is in the mid to upper 90s on room air.


CRP 2.0 and normal d-dimer.


Sodium up to 136.


BUN to creatinine ratio is still elevated at 30.0.


Estimated GFR is 30 and stable.  This is his chronic stable state.


Episode of blood sugar of 300 today.


Started sliding scale insulin.





9/15/2020


Patient is feeling well.


Vital signs stable with oxygenation in the mid upper 90s and afebrile.


Systolic blood pressure in the 160s today


Labs held today.


Blood sugars improved.


Awaiting placement.





9/16/2020


Patient is stable without any significant changes.


Blood pressure is ranging in the 150s up to 170s.  Afebrile.  Oxygen saturation 

on room air ranging from 90 to 100%, weight stable at approximately 66 kg


Hemoglobin and white count are stable at 12.0 and 7.9 respectively.


GFR 36 with creatinine of 1.8 which is improved.


BUN stable at 62.  Elevation likely secondary to protein catabolism from 

dexamethasone.


Blood sugars improved ranging from 120 up to 262


Still awaiting placement


Patient has not been getting his ethacrynic acid because it is nonformulary.





9/17/2020


Blood pressure continues to be elevated.  He was started on low-dose Lasix this 

morning.  He would likely benefit from additional treatment.


Improvement in glucose. But continues to be elevated.  This should resolve once 

he is off of dexamethasone.


No significant change overnight.





9/18/2020


Blood pressure is improved with the addition of amlodipine.


Patient has no complaints.


Glucose continues to be elevated secondary to dexamethasone





9/19/2020


Blood pressure is improved but continues to be elevated.


Blood pressure ranges from 122//56


Fingerstick blood sugars also continue to be elevated.


Still off oxygen and generally doing either better or stable on all parameters.





9/20/2020


Blood pressure improved with the last few systolic blood pressures between 149 

and 155


Blood sugars continue to be elevated likely secondary to dexamethasone


Estimated GFR 34.


BUN and creatinine both stable at 61 and 1.9, respectively





09/21/2020


Blood pressures 148/45.


Dexamethasone discontinued yesterday. 


Blood sugars this morning 82.  Insulin held.  





09/22/2020


Blood pressures 139/67


Blood sugars 107 and 145 today.


Tinea to groin and buttocks.  Lotrimin topically started. 


WBC 16.7 likely due to IV decadron.  No bandemia or signs of symptoms of an 

infective process.


BUN 52


CRE 2.0


GFR 32


Nothing acute appreciated on chest xray.





09/24/20


Pt stable without any changes.  


Decreased cough noted


Awaiting placement to [place] on Monday


Isolation precautions removed.


Vital signs trend


-Blood pressure:  105-148/58-90


-T-max:  98.2


-Heart rate:  71-95


-Pulse ox 94-98% on room air


Lab Results


no labs drawn today


PLAN


-awaiting nursing home placement


-Repeat labs as needed


-PT and OT to continue to treat patient








09/25/20


Pt stable without any changes.  


Awaiting placement to [place] on 09/28/20





Vital signs trend


-Blood pressure:  125-147/80-99


-T-max:  98.1


-Heart rate:  


-Pulse ox 96-99% on room air


Lab Results


no labs drawn today


PLAN


-awaiting nursing home placement


-Repeat labs as needed


-PT and OT to continue to treat patient





09/26/20


Patient has remained stable


Nursing is reporting patient is less interactive and appears more confused


Vital signs trend


-Blood pressure: 096999/[id number]


-T-max 99.1 at 9 PM yesterday


-Heart rate 75-98


-Pulse ox greater than 91% on room air


Lab results


-WBC up from 15.4-18.28, no changes in differential


-GFR stable at 32


PLAN


-SNF placement


-Let me sleep protocol


-Start Seroquel low-dose tonight


-Continue Symbicort


-PRN albuterol


-Repeat labs as needed


-Monitor for hypoxemia


-Monitor urine output


-Renally dosed medications


-Supplementation for B12


-PRN Hydralazine


-Continue home insulin 


-Accu-checks before meals and bedtime


-Hypoglycemia protocol


-Hold PO meds


-Continue home omeprazole, statin, sertaline, metoprolol and amlodipine. 





9/27/2020


Nursing reports patient reports has been drinking minimal amount of amount of 

water


Vital signs trend


-Blood pressure 117-142/61-82


-T-max 99.1


-Heart rate 


-Pulse ox greater than 87% on room air


Lab results


WBC up from 18.28-18.64


Hemoglobin down from 13.4-12.9


Sodium up from 134-135


GFR down from 32-30


Glucose POC: 166-296





9/28/2020


-Doing much worse today


-Lethargic


-Positive for UTI, cultures pending


-Started on Rocephin


-Temp is 99.0.  Usual temp is 97.0.  Blood cultures ordered


-Taking po poorly.  


-Epistaxis to bilateral nares yesterday with rhinorockets placed.  Rhinorockets 

removed today.  No further epistaxis noted.


-Xarelto and ASA discontinued. TEDS ordered


Vital signs trend


-Blood pressure 110-147/74-84


-T-max 99.0


-Heart rate 


-Pulse ox 91-94% on room air


Lab results


WBC down from 20.62 to 17.75


Hemoglobin down from 12.9-12.3


Sodium up from 133-138


GFR up from 29-32


Glucose POC: 166-296


-Phos 2.4


-Mg 1.8


ABG's on room air


-pH 7.54


-pCO2 26.4


-pO2 61.0


-HCO3 22.5


UA


3+ blood


3+ nitrite


3+ leukocyte esterase


Urine WBC 20-30


Moderate urine mucous





09/29/20


-More alert today


-Swallow eval completed by speech therapy-recommend NPO


-Urine cultures susceptible to Rocephin


-Xarelto restarted due to history of A-fib


-Blood cultures pending


Vital signs trend


-Blood pressure 111-147/52-76


-T-max 101.1


-Heart rate 


-Pulse ox 90-96% on room air


Lab Results


-WBC down from 17.75-11.33


-K+ down from 4.3-3.7


-BUN down from 40-37


-Cre 2.0


-GFR 32





09/30/20


-More alert today


-Swallow eval completed by speech therapy-regular diet with nectar thick liquids


-Blood cultures no growth


Vital signs trend


-Blood pressure 113-146/57-74


-T-max 98.8


-Heart rate 


-Pulse ox 90-93 on room air


Lab Results


-WBC down from 11.33-10.56


-K+ up from 3.7-3.9


-BUN 37


-Cre 0.9 from 2.0


-GFR up from 32-34





10/01/20


-Labored breathing today-chest xray obtained


-Day 3 Rocephin


Vital signs trend


-Blood pressure 124-138/64-85


-T-max 98.2


-Heart rate 74-96


-Pulse ox 90-94 on room air


Lab Results


-WBC up from 10.56-10.88


-Hgb up from 10.9 to 12.6


-BUN 37


-Cre 2.0 up from 1.9


-GFR down to 32 from 34


-AST up to 70 from 40


-ALT up to 79 from 52





10/02/20


-Several diarrhea stools in the last 24 hours reported by nursing staff.  

Awaiting stool for c-diff.


-Day 4 Rocephin


Vital signs trend


-Blood pressure 132-140/53-83


-T-max 98.4


-Heart rate 75-86


-Pulse ox 92-94 on room air


Lab Results


-no labs collected today





10/3/2020


Diarrhea has resolved.


Canceled C. difficile stool antigen testing.


Currently on IV Rocephin


Vital signs stable, afebrile


No labs collected today.





10/4/2020


Switched to Keflex.


White count 10.4, CRP 3.0, GFR 34.


Afebrile.


Diarrhea improved.


Awaiting placement.





- Plan


Plan:: 








COVID-19


COPD (chronic obstructive pulmonary disease)


-Continue Symbicort


-PRN albuterol


-Repeat labs as needed


-Monitor for hypoxemia





Atrial fibrillation with controlled ventricular rate


-Continue metoprolol


-On xarelto


 


Acute on chronic renal failure 2/2 Volume depletion, improved


Hyponatremia, resolved


Iron deficiency anemia


B12 deficiency


-Monitor urine output


-Renally dosed medications





Hypertension


-Metoprolol


-Continue Lasix 20 mg daily


-Amlodipine 5 mg at night


-PRN Hydralazine





Diabetes mellitus, [id number]-6.9%


-Accu-checks before breakfast and at bedtime


-Hypoglycemia protocol


-Hold PO meds





Gastroesophageal reflux disease


-Continue home omeprazole





Dyslipidemia


- Continue home statin





Hypoalbuminemia


-Encourage p.o. intake





Depression


-Continue home sertraline





Orthostatic, resolved





UTI (urinary tract infection)


-Finish Keflex for total of 7 days of treatment


-BC no growth.





PROPHYLAXIS


DVT- home Xarelto


GI- home omeprazole





CODE STATUS: FULL CODE





DISPOSITION


Awaiting placement at [facility] on Monday, October 5, 2020.

## 2020-10-05 NOTE — [id number]
**Discharge Summary





- Hospital Course


Free Text/Narrative:: 





This is an 80-year-old gentleman with a past medical history of COPD, CHF, and 

chronic atrial fibrillation, who is now brought to the ED by EMS with a report 

of several months of a cough, with 1 day of generalized weakness, and decreased 

oxygen saturations, fluctuating between 97 and 92%, noted tonight.  Notes some 

shortness of breath for some time but not long.


Denies chest pain, palpitations, fever, abdominal pain, worsening cough, fever, 

chills, sore throat, congestion, nausea, vomiting, diarrhea, constipation, 

abdominal pain, urinary symptoms, weight changes, body aches or headaches. 


Of note patient is a resident of [place] where there have been + COVID cases 

in the past week. 


Diagnosis: Stroke: No





- Discharge Data


Discharge Date: 10/05/20 (Admit date: 09/11/20)


Discharge Disposition: DC/Tfer to SNF [facility]


Condition: Good





- Referral to Home Health


Primary Care Physician: 


Francis Blanca MD








- Discharge Diagnosis/Problem(s)


(1) Acute on chronic renal failure


SNOMED Code(s): 563714777


   ICD Code: N17.9 - ACUTE KIDNEY FAILURE, UNSPECIFIED; N18.9 - CHRONIC KIDNEY 

DISEASE, UNSPECIFIED   Status: Acute   Priority: High   Current Visit: Yes   


Qualifiers: 


   Acute renal failure type: unspecified   Chronic kidney disease stage: 

unspecified stage   Qualified Code(s): N17.9 - Acute kidney failure, 

unspecified; N18.9 - Chronic kidney disease, unspecified   





(2) Atrial fibrillation with controlled ventricular rate


SNOMED Code(s): 90620797


   ICD Code: I48.91 - UNSPECIFIED ATRIAL FIBRILLATION   Status: Chronic   

Priority: High   Current Visit: Yes   





(3) B12 deficiency


SNOMED Code(s): 422370095


   ICD Code: E53.8 - DEFICIENCY OF OTHER SPECIFIED B GROUP VITAMINS   Status: 

Chronic   Priority: Medium   Current Visit: Yes   





(4) COVID-19


SNOMED Code(s): 804404047


   ICD Code: U07.1 - COVID-19   Status: Acute   Priority: High   Current Visit: 

Yes   





(5) Depression


SNOMED Code(s): 74280936


   ICD Code: F32.9 - MAJOR DEPRESSIVE DISORDER, SINGLE EPISODE, UNSPECIFIED   

Status: Chronic   Priority: Medium   Current Visit: Yes   


Qualifiers: 


   Depression Type: unspecified   Qualified Code(s): F32.9 - Major depressive 

disorder, single episode, unspecified   





(6) Dyslipidemia


SNOMED Code(s): 208099517


   ICD Code: E78.5 - HYPERLIPIDEMIA, UNSPECIFIED   Status: Chronic   Priority: 

Medium   Current Visit: Yes   





(7) Hypoalbuminemia


SNOMED Code(s): 033224574


   ICD Code: E88.09 - OTH DISORDERS OF PLASMA-PROTEIN METABOLISM, NEC   Status: 

Acute   Priority: High   Current Visit: Yes   





(8) Hyponatremia


SNOMED Code(s): 28560099


   ICD Code: E87.1 - HYPO-OSMOLALITY AND HYPONATREMIA   Status: Resolved   

Priority: Low   Current Visit: Yes   





(9) Iron deficiency


SNOMED Code(s): 99638063


   ICD Code: E61.1 - IRON DEFICIENCY   Status: Acute   Priority: High   Current 

Visit: Yes   





(10) Orthostasis


SNOMED Code(s): 78271200


   ICD Code: I95.1 - ORTHOSTATIC HYPOTENSION   Status: Resolved   Priority: High

  Current Visit: Yes   





(11) Diabetes


SNOMED Code(s): 37734920


   ICD Code: E11.9 - TYPE 2 DIABETES MELLITUS WITHOUT COMPLICATIONS   Status: 

Chronic   Priority: High   Current Visit: Yes   


Qualifiers: 


   Diabetes mellitus type: type 2   Diabetes mellitus long term insulin use: 

unspecified long term insulin use status   Diabetes mellitus complication 

status: without complication   Qualified Code(s): E11.9 - Type 2 diabetes 

mellitus without complications   





(12) GERD (gastroesophageal reflux disease)


SNOMED Code(s): 650761697


   ICD Code: K21.9 - GASTRO-ESOPHAGEAL REFLUX DISEASE WITHOUT ESOPHAGITIS   

Status: Chronic   Priority: Medium   Current Visit: No   


Qualifiers: 


   Esophagitis presence: without esophagitis   Qualified Code(s): K21.9 - 

Gastro-esophageal reflux disease without esophagitis   





(13) Hypertension


SNOMED Code(s): 49628060


   ICD Code: I10 - ESSENTIAL (PRIMARY) HYPERTENSION   Status: Chronic   

Priority: High   Current Visit: No   


Qualifiers: 


   Hypertension type: unspecified   Qualified Code(s): I10 - Essential (primary)

hypertension   





(14) COPD (chronic obstructive pulmonary disease)


SNOMED Code(s): 57049942


   ICD Code: J44.9 - CHRONIC OBSTRUCTIVE PULMONARY DISEASE, UNSPECIFIED   

Status: Chronic   Priority: High   Current Visit: No   


Qualifiers: 


   COPD type: unspecified COPD   Qualified Code(s): J44.9 - Chronic obstructive 

pulmonary disease, unspecified   





(15) UTI (urinary tract infection)


SNOMED Code(s): 55729789


   ICD Code: N39.0 - URINARY TRACT INFECTION, SITE NOT SPECIFIED   Status: Acute

  Priority: High   Current Visit: Yes   


Qualifiers: 


   Urinary tract infection type: acute cystitis   Hematuria presence: with 

hematuria   Qualified Code(s): N30.01 - Acute cystitis with hematuria   





- Patient Summary/Data


Consults: 


                                  Consultations





09/14/20 10:26


PT Evaluation and Treatment [CONS] Routine 





09/14/20 10:27


OT Evaluation and Treatment [CONS] Routine 





09/22/20 11:35


Consult to Speech Language Pathology [SLP Evaluation and Treatment] [CONS] 

Routine 





09/30/20 10:22


Consult to Speech Language Pathology [SLP Evaluation and Treatment] [CONS] 

Routine 











Hospital Course: 





9/11/20


1 day of weakness and low O2 sats


Lives in [place], + COVID patients in past week


Pulse ox on admission 89%


CXR with cardiomegaly only, no lung parenchyma changes


Orthostatic --> given 500ml IVF bolus--> still orthostatic--> 2nd bolus


Lab results:


- Hb 13.2, Hct 40.1


- Na 133, GFR 19, glucose 165


- Troponin negative


Heart rate on admission 98


EKG with a. fib., rate 84


RVR will likely increase O2 needs- COVID positive


GFR baseline 26, 19 on admission


Not on any nephrotoxic meds 


Decreased oral intake


Admission /81


Hypertension home management with metoprolol


Diabetes home management with glimepiride, sitagliptin and insulin


Glucose on admission 165


Smoked 1.5 ppd for 51 years; Quit in 2005 


PLAN


- Start Remdesivir


- Start Dexamethasone


- PRN albuterol


- Continue Symbicort


- Hold metoprolol due to orthostatic hypotension


- Monitor for hypoxemia


- Monitor urine output


- Renally dosed medications


- Repeat labs and replace electrolytes as needed  


- Hold metoprolol


- PRN Hydralazine


- Continue home insulin at same dose for now


- No premeals, adjust with new measurements as needed


- New HbA1c


- Accu-checks before meals and bedtime


- Hypoglycemia protocol


- Hold PO meds


- Continue home omeprazole


- Continue home statin


- Dietary consult


- Continue home sertraline


Patient will be admitted to med surg floor, will be started on Remdesivir and 

dexamethasone. 





9/12/20


No longer on oxygen supplementation since 1:30 PM on 9/11/2020


Vital signs trend


Blood pressure: 117 264/50 3-91


T-max 98.2


Heart rate: 66-98


Pulse ox greater than 89%


Lab results


WBC down from 6.96-4.05


Hemoglobin down from 13.2-11.7


D-dimer stable from 0.72 2.71


Sodium down from 1 33-1 32


GFR up from 19-26


B12 87


BNP up from 2171-2411


CRP 5.2


Iron 26


Glucose -258


A1c 6.9%


PLAN


-Continue Remdesivir day 2 out of 5


-Continue dexamethasone day 2 out of 10


-Continue Symbicort


-PRN albuterol


-Repeat labs as needed


-Continue to hold metoprolol


-Monitor for hypoxemia


-Monitor urine output


-Renally dosed medications


-Repeat labs and replace electrolytes as needed  


-Hold metoprolol


-PRN Hydralazine


-Continue home insulin at same dose for now


-Accu-checks before meals and bedtime


-Hypoglycemia protocol


-Hold PO meds


-Continue home omeprazole


- Continue home statin


Patient will remain admitted for antiviral treatment for COVID.


Length of stay at least 5 days due to length of treatment required for COVID.





9/13/20


Shortness of breath has resolved


Has not required oxygen supplementation the past 24 hours


Vital signs trend


Blood pressure 436654/7289


T-max 98.4


Heart rate 66 to 88/min


Pulse ox greater than 97% on room air


New lab results


WBC up from 4.05-7.54


Hemoglobin up from 11.7-11.9


Platelets up from 193-235


D-dimer down from 0.71 2.5


Sodium stable from 1 


Chloride up from 94-96


GFR up from 26-30


Magnesium up from 1.9-2.1


CRP down from 5.2-2.8


Glucose trend 171-220





9/14/2020


Patient is asymptomatic.


Vital signs are stable and pulse ox is in the mid to upper 90s on room air.


CRP 2.0 and normal d-dimer.


Sodium up to 136.


BUN to creatinine ratio is still elevated at 30.0.


Estimated GFR is 30 and stable.  This is his chronic stable state.


Episode of blood sugar of 300 today.


Started sliding scale insulin.





9/15/2020


Patient is feeling well.


Vital signs stable with oxygenation in the mid upper 90s and afebrile.


Systolic blood pressure in the 160s today


Labs held today.


Blood sugars improved.


Awaiting placement.





9/16/2020


Patient is stable without any significant changes.


Blood pressure is ranging in the 150s up to 170s.  Afebrile.  Oxygen saturation 

on room air ranging from 90 to 100%, weight stable at approximately 66 kg


Hemoglobin and white count are stable at 12.0 and 7.9 respectively.


GFR 36 with creatinine of 1.8 which is improved.


BUN stable at 62.  Elevation likely secondary to protein catabolism from 

dexamethasone.


Blood sugars improved ranging from 120 up to 262


Still awaiting placement


Patient has not been getting his ethacrynic acid because it is nonformulary.





9/17/2020


Blood pressure continues to be elevated.  He was started on low-dose Lasix this 

morning.  He would likely benefit from additional treatment.


Improvement in glucose. But continues to be elevated.  This should resolve once 

he is off of dexamethasone.


No significant change overnight.





9/18/2020


Blood pressure is improved with the addition of amlodipine.


Patient has no complaints.


Glucose continues to be elevated secondary to dexamethasone





9/19/2020


Blood pressure is improved but continues to be elevated.


Blood pressure ranges from 122//56


Fingerstick blood sugars also continue to be elevated.


Still off oxygen and generally doing either better or stable on all parameters.





9/20/2020


Blood pressure improved with the last few systolic blood pressures between 149 

and 155


Blood sugars continue to be elevated likely secondary to dexamethasone


Estimated GFR 34.


BUN and creatinine both stable at 61 and 1.9, respectively





09/21/2020


Blood pressures 148/45.


Dexamethasone discontinued yesterday. 


Blood sugars this morning 82.  Insulin held.  





09/22/2020


Blood pressures 139/67


Blood sugars 107 and 145 today.


Tinea to groin and buttocks.  Lotrimin topically started. 


WBC 16.7 likely due to IV decadron.  No bandemia or signs of symptoms of an 

infective process.


BUN 52


CRE 2.0


GFR 32


Nothing acute appreciated on chest xray.





09/24/20


Pt stable without any changes.  


Decreased cough noted


Awaiting placement to [place] on Monday


Isolation precautions removed.


Vital signs trend


-Blood pressure:  105-148/58-90


-T-max:  98.2


-Heart rate:  71-95


-Pulse ox 94-98% on room air


Lab Results


no labs drawn today


PLAN


-awaiting nursing home placement


-Repeat labs as needed


-PT and OT to continue to treat patient








09/25/20


Pt stable without any changes.  


Awaiting placement to [facility] on 09/28/20





Vital signs trend


-Blood pressure:  125-147/80-99


-T-max:  98.1


-Heart rate:  


-Pulse ox 96-99% on room air


Lab Results


no labs drawn today


PLAN


-awaiting nursing home placement


-Repeat labs as needed


-PT and OT to continue to treat patient





09/26/20


Patient has remained stable


Nursing is reporting patient is less interactive and appears more confused


Vital signs trend


-Blood pressure: 504026/6289


-T-max 99.1 at 9 PM yesterday


-Heart rate 75-98


-Pulse ox greater than 91% on room air


Lab results


-WBC up from 15.4-18.28, no changes in differential


-GFR stable at 32


PLAN


-SNF placement


-Let me sleep protocol


-Start Seroquel low-dose tonight


-Continue Symbicort


-PRN albuterol


-Repeat labs as needed


-Monitor for hypoxemia


-Monitor urine output


-Renally dosed medications


-Supplementation for B12


-PRN Hydralazine


-Continue home insulin 


-Accu-checks before meals and bedtime


-Hypoglycemia protocol


-Hold PO meds


-Continue home omeprazole, statin, sertaline, metoprolol and amlodipine. 





9/27/2020


Nursing reports patient reports has been drinking minimal amount of amount of 

water


Vital signs trend


-Blood pressure 117-142/61-82


-T-max 99.1


-Heart rate 


-Pulse ox greater than 87% on room air


Lab results


WBC up from 18.28-18.64


Hemoglobin down from 13.4-12.9


Sodium up from 134-135


GFR down from 32-30


Glucose POC: 166-296





9/28/2020


-Doing much worse today


-Lethargic


-Positive for UTI, cultures pending


-Started on Rocephin


-Temp is 99.0.  Usual temp is 97.0.  Blood cultures ordered


-Taking po poorly.  


-Epistaxis to bilateral nares yesterday with rhinorockets placed.  Rhinorockets 

removed today.  No further epistaxis noted.


-Xarelto and ASA discontinued. TEDS ordered


Vital signs trend


-Blood pressure 110-147/74-84


-T-max 99.0


-Heart rate 


-Pulse ox 91-94% on room air


Lab results


WBC down from 20.62 to 17.75


Hemoglobin down from 12.9-12.3


Sodium up from 133-138


GFR up from 29-32


Glucose POC: 166-296


-Phos 2.4


-Mg 1.8


ABG's on room air


-pH 7.54


-pCO2 26.4


-pO2 61.0


-HCO3 22.5


UA


3+ blood


3+ nitrite


3+ leukocyte esterase


Urine WBC 20-30


Moderate urine mucous





09/29/20


-More alert today


-Swallow eval completed by speech therapy-recommend NPO


-Urine cultures susceptible to Rocephin


-Xarelto restarted due to history of A-fib


-Blood cultures pending


Vital signs trend


-Blood pressure 111-147/52-76


-T-max 101.1


-Heart rate 


-Pulse ox 90-96% on room air


Lab Results


-WBC down from 17.75-11.33


-K+ down from 4.3-3.7


-BUN down from 40-37


-Cre 2.0


-GFR 32





09/30/20


-More alert today


-Swallow eval completed by speech therapy-regular diet with nectar thick liquids


-Blood cultures no growth


Vital signs trend


-Blood pressure 113-146/57-74


-T-max 98.8


-Heart rate 


-Pulse ox 90-93 on room air


Lab Results


-WBC down from 11.33-10.56


-K+ up from 3.7-3.9


-BUN 37


-Cre 0.9 from 2.0


-GFR up from 32-34





10/01/20


-Labored breathing today-chest xray obtained


-Day 3 Rocephin


Vital signs trend


-Blood pressure 124-138/64-85


-T-max 98.2


-Heart rate 74-96


-Pulse ox 90-94 on room air


Lab Results


-WBC up from 10.56-10.88


-Hgb up from 10.9 to 12.6


-BUN 37


-Cre 2.0 up from 1.9


-GFR down to 32 from 34


-AST up to 70 from 40


-ALT up to 79 from 52





10/02/20


-Several diarrhea stools in the last 24 hours reported by nursing staff.  

Awaiting stool for c-diff.


-Day 4 Rocephin


Vital signs trend


-Blood pressure 132-140/53-83


-T-max 98.4


-Heart rate 75-86


-Pulse ox 92-94 on room air


Lab Results


-no labs collected today





10/3/2020


Diarrhea has resolved.


Canceled C. difficile stool antigen testing.


Currently on IV Rocephin


Vital signs stable, afebrile


No labs collected today.





10/4/2020


Switched to Keflex.


White count 10.4, CRP 3.0, GFR 34.


Afebrile.


Diarrhea improved.


Awaiting placement.





10/05/20


Discharge to [facility].  VSS.  Pt has been on 7 days of antibiotic for UTI, so this 

will not be continued at the [facility].  Pt is to follow up with his primary care 

provider in 1 week.  








- Patient Instructions


Diet: Regular Diet as Tolerated


Activity: As Tolerated


Driving: Do Not Drive


Notify Provider of: Fever, Increased Pain, Nausea and/or Vomiting


Other/Special Instructions: Discharge to [facility].  Follow-up with 

primary care provider within 7-10 days.  Resume home meds as indicated.  

Completed treatment from COVID while in the hospital.  Should symptoms worsen, 

contact primary care provider or return to the ED.  Check blood sugars QID AC 

and HS.  Check your weight daily and record in a journal and bring to 

appointment with your primary physician.  Take all medications as prescribed.





- Discharge Plan


*PRESCRIPTION DRUG MONITORING PROGRAM REVIEWED*: Not Applicable


*COPY OF PRESCRIPTION DRUG MONITORING REPORT IN PATIENT CARA: Not Applicable


Prescriptions/Med Rec: 


Mupirocin Oint [Bactroban Oint] 1 gm TOP TID #1 tube


Furosemide [Lasix] 20 mg PO DAILY #20 tablet


Clotrimazole [Lotrimin AF 1% Crm] 1 gm TOP BID #1 tube


amLODIPine [Norvasc] 5 mg PO BEDTIME #20 tablet


QUEtiapine [SEROquel] 12.5 mg PO BEDTIME #20 tablet


Home Medications: 


                                    Home Meds





Aspirin [Halfprin] 81 mg PO DAILY #30 tab.ec 07/14/17 [Rx]


Ferrous Sulfate 325 mg PO DAILY #30 tablet 07/14/17 [Rx]


Acetaminophen [Tylenol] 650 mg PO Q4H PRN 04/15/18 [History]


Fluticasone Propionate [Flonase Allergy Relief] 1 spray NASBOTH DAILY 04/15/18 

[History]


Omeprazole 20 mg PO DAILY 04/15/18 [History]


Spironolactone [Aldactone] 25 mg PO QAM 04/15/18 [History]


atorvaSTATin [Lipitor] 40 mg PO BEDTIME 04/15/18 [History]


ondansetron HCL [Zofran] 4 mg PO Q6H 04/15/18 [History]


Metoprolol Succinate 100 mg PO QAM #0 12/28/18 [Rx]


Tamsulosin [Flomax] 0.8 mg PO QPM #60 cap.er 12/28/18 [Rx]


Albuterol/Ipratropium [DuoNeb 3.0-0.5 MG/3 ML] 3 ml INH Q4H PRN 09/11/20 

[History]


Aloe Vera/Sodium Chloride [Ayr Saline Nasal Gel] 1 applic RHONA TID PRN 09/11/20 

[History]


Budesonide/Formoterol [Symbicort 160-4.5 MCG] 2 puff INH BID 09/11/20 [History]


Calcium Alginate [Kaleb] 1 each .XX ASDIRECTED PRN 09/11/20 [History]


Cholecalciferol (Vitamin D3) [Vitamin D3] 1,000 unit PO DAILY 09/11/20 [History]


Docusate Sodium/Sennosides [Senna Plus] 1 each PO DAILY 09/11/20 [History]


Ethacrynic Acid 50 mg PO BID 09/11/20 [History]


Glimepiride [Amaryl] 2 mg PO DAILY 09/11/20 [History]


Insulin Glarg,Human.Rec.Analog [Lantus] 10 unit SQ BEDTIME 09/11/20 [History]


Rivaroxaban [Xarelto] 10 mg PO QPM 09/11/20 [History]


Sertraline [Zoloft] 50 mg PO BEDTIME 09/11/20 [History]


SitaGLIPtin [Januvia] 100 mg PO DAILY 09/11/20 [History]


calcitrioL [Calcitriol] 0.25 mcg PO MOFR 09/11/20 [History]


Clotrimazole [Lotrimin AF 1% Crm] 1 gm TOP BID #1 tube 09/24/20 [Rx]


Furosemide [Lasix] 20 mg PO DAILY #20 tablet 09/24/20 [Rx]


Mupirocin Oint [Bactroban Oint] 1 gm TOP TID #1 tube 09/24/20 [Rx]


amLODIPine [Norvasc] 5 mg PO BEDTIME #20 tablet 09/24/20 [Rx]


QUEtiapine [SEROquel] 12.5 mg PO BEDTIME #20 tablet 10/05/20 [Rx]








Oxygen Therapy Mode: Room Air


Patient Handouts:  COVID-19 Frequently Asked Questions, Heart Failure Action 

Plan, Sepsis, Diagnosis, Adult, COVID-19: How to Protect Yourself and Others - 

CDC, Prevent the Spread of COVID-19 if You Are Sick - CDC


Forms:  ED Department Discharge


Referrals: 


Francis Blanca MD [Primary Care Provider] - 10/12/20 10:30 am (Please 

follow up with Dr. Blanca on Mon. Oct. 12th at 1030am)





- Discharge Summary/Plan Comment


DC Time >30 min.: Yes (45 min)





- Patient Data


Vitals - Most Recent: 


                                Last Vital Signs











Temp  97.3 F   10/05/20 08:43


 


Pulse  72   10/05/20 08:48


 


Resp  20   10/05/20 08:43


 


BP  123/80   10/05/20 08:49


 


Pulse Ox  96   10/05/20 08:43








                                        





Orthostatic Blood Pressure [     71/53


Standing]                        


Orthostatic Blood Pressure [     100/71


Sitting]                         


Orthostatic Blood Pressure [     121/52


Supine]                          








Weight - Most Recent: 136 lb 1.6 oz


I&O - Last 24 hours: 


                                 Intake & Output











 10/04/20 10/05/20 10/05/20





 22:59 06:59 14:59


 


Intake Total 730 480 


 


Balance 730 480 











Lab Results - Last 24 hrs: 


                         Laboratory Results - last 24 hr











  10/04/20 10/04/20 Range/Units





  15:40 22:35 


 


POC Glucose   288 H  ()  mg/dL


 


SARS-CoV-2 RNA (SHAHNAZ)  Positive H   (NEGATIVE)  











FREDERIC Results - Last 24 hrs: 


                                  Microbiology











 09/28/20 17:12 Aerobic Blood Culture - Preliminary





 Blood - Venous    NO GROWTH AFTER 6 DAYS





 Anaerobic Blood Culture - Preliminary





    NO GROWTH AFTER 6 DAYS


 


 09/28/20 17:25 Aerobic Blood Culture - Preliminary





 Blood - Venous - Lab Draw    NO GROWTH AFTER 6 DAYS





 Anaerobic Blood Culture - Preliminary





    NO GROWTH AFTER 6 DAYS


 


 09/28/20 13:24 Aerobic Blood Culture - Preliminary





 Blood - Venous - Lab Draw    NO GROWTH AFTER 6 DAYS





 Anaerobic Blood Culture - Preliminary





    NO GROWTH AFTER 6 DAYS


 


 09/28/20 13:09 Aerobic Blood Culture - Preliminary





 Blood - Venous    NO GROWTH AFTER 6 DAYS





 Anaerobic Blood Culture - Preliminary





    NO GROWTH AFTER 6 DAYS











Med Orders - Current: 


                               Current Medications





Acetaminophen (Tylenol)  325 mg PO Q4H PRN


   PRN Reason: Pain (Mild 1-3)/fever


   Last Admin: 09/28/20 17:40 Dose:  325 mg


   Documented by: 


Albuterol/Ipratropium (Duoneb 3.0-0.5 Mg/3 Ml)  3 ml INH Q4H PRN


   PRN Reason: Shortness of Breath


Amlodipine Besylate (Norvasc)  5 mg PO DAILY [facility]


   Last Admin: 10/05/20 08:49 Dose:  5 mg


   Documented by: 


Calcitriol (Rocaltrol)  0.25 mcg PO MOFR [facility]


   Last Admin: 10/05/20 09:05 Dose:  0.25 mcg


   Documented by: 


Cephalexin (Keflex)  500 mg PO Q12HR [facility]


   Stop: 10/06/20 21:01


   Last Admin: 10/05/20 08:48 Dose:  500 mg


   Documented by: 


Cholecalciferol (Vitamin D3)  25 mcg PO DAILY [facility]


   Last Admin: 10/05/20 08:48 Dose:  25 mcg


   Documented by: 


Clotrimazole (Lotrimin Af 1% Crm)  0 gm TOP BID [facility]


   Last Admin: 10/05/20 09:02 Dose:  1 applic


   Documented by: 


Dextrose/Water (Dextrose 50% In Water)  50 ml IVPUSH ASDIRECTED PRN


   PRN Reason: Hypoglycemia


Ferrous Sulfate (Ferrous Sulfate)  324 mg PO Q48H [facility]


   Last Admin: 10/04/20 09:35 Dose:  324 mg


   Documented by: 


Fluticasone Propionate (Flonase)  0 gm NASBOTH DAILY [facility]


   Last Admin: 10/05/20 08:56 Dose:  1 spray


   Documented by: 


Furosemide (Lasix)  20 mg PO DAILY [facility]


   Last Admin: 10/05/20 08:49 Dose:  20 mg


   Documented by: 


Insulin Glargine (Lantus)  15 unit SUBCUT BEDTIME [facility]


   Last Admin: 10/04/20 23:16 Dose:  15 units


   Documented by: 


Metoprolol Succinate (Toprol Xl)  100 mg PO DAILY [facility]


   Last Admin: 10/05/20 08:48 Dose:  100 mg


   Documented by: 


Mometasone Furoate/Formoterol Fumar (Dulera 200-5 Mcg)  2 puff IH BID [facility]


   Last Admin: 10/05/20 08:42 Dose:  2 puff


   Documented by: 


Mupirocin (Bactroban Oint)  0 gm TOP TID [facility]


   Last Admin: 10/05/20 09:00 Dose:  1 applic


   Documented by: 


Ethacrynic Acid 50 (Mg **Ptom)  50 mg PO BID [facility]


   Last Admin: 10/05/20 09:02 Dose:  Not Given


   Documented by: 


Ondansetron HCl (Zofran)  4 mg IV Q6H PRN


   PRN Reason: Nausea/Vomiting


   Last Admin: 09/12/20 11:18 Dose:  4 mg


   Documented by: 


Pantoprazole Sodium (Protonix***)  40 mg PO DAILY [facility]


   Last Admin: 10/05/20 08:50 Dose:  40 mg


   Documented by: 


Quetiapine Fumarate (Seroquel)  12.5 mg PO BEDTIME [facility]


   Last Admin: 10/04/20 23:21 Dose:  12.5 mg


   Documented by: 


Rivaroxaban (Xarelto)  10 mg PO DAILY [facility]


   Last Admin: 10/05/20 08:50 Dose:  10 mg


   Documented by: 


Rosuvastatin Calcium (Crestor)  10 mg PO BEDTIME [facility]


   Last Admin: 10/04/20 23:12 Dose:  10 mg


   Documented by: 


Senna/Docusate Sodium (Senna Plus)  1 tab PO DAILY [facility]


   Last Admin: 10/05/20 08:49 Dose:  1 tab


   Documented by: 


Sertraline HCl (Zoloft)  50 mg PO BEDTIME [facility]


   Last Admin: 10/04/20 23:21 Dose:  50 mg


   Documented by: 


Sodium Chloride (Ayr Saline Nasal Gel)  0 gm RHONA TID PRN


   PRN Reason: Dryness


Sodium Chloride (Saline Flush)  10 ml FLUSH ONETIME PRN


   PRN Reason: IV FLUSH


   Last Admin: 09/27/20 10:05 Dose:  10 ml


   Documented by: 


Spironolactone (Aldactone)  25 mg PO DAILY [facility]


   Last Admin: 10/05/20 08:49 Dose:  25 mg


   Documented by: 


Tamsulosin HCl (Flomax)  0.8 mg PO BEDTIME [facility]


   Last Admin: 10/04/20 23:12 Dose:  0.8 mg


   Documented by: 





Discontinued Medications





Amlodipine Besylate (Norvasc)  5 mg PO BEDTIME [facility]


   Last Admin: 09/20/20 20:35 Dose:  5 mg


   Documented by: 


Amlodipine Besylate (Norvasc)  5 mg PO BEDTIME [facility]


   Last Admin: 09/27/20 21:08 Dose:  5 mg


   Documented by: 


Aspirin (Halfprin)  81 mg PO DAILY [facility]


   Last Admin: 09/28/20 10:34 Dose:  Not Given


   Documented by: 


Calcitriol (Rocaltrol)  0.25 mcg PO ONETIME ONE


   Stop: 09/11/20 13:01


   Last Admin: 09/11/20 13:09 Dose:  0.25 mcg


   Documented by: 


Dexamethasone (Dexamethasone)  6 mg IVPUSH Q24H [facility]


   Stop: 09/20/20 13:01


   Last Admin: 09/14/20 12:05 Dose:  6 mg


   Documented by: 


Dexamethasone (Dexamethasone)  6 mg PO Q24H [facility]


   Stop: 09/20/20 13:01


   Last Admin: 09/19/20 12:16 Dose:  6 mg


   Documented by: 


Sodium Chloride (Normal Saline)  500 mls @ 1,000 mls/hr IV .BOLUS ONE


   Stop: 09/11/20 04:18


   Last Admin: 09/11/20 04:05 Dose:  1,000 mls/hr


   Documented by: 


Sodium Chloride (Normal Saline)  500 mls @ 1,000 mls/hr IV .BOLUS ONE


   Stop: 09/11/20 05:56


   Last Admin: 09/11/20 05:34 Dose:  1,000 mls/hr


   Documented by: 


Remdesivir 100 mg/ Sodium (Chloride)  100 mls @ 100 mls/hr IV Q24H [facility]


   Stop: 09/15/20 13:59


   Last Admin: 09/15/20 12:38 Dose:  100 mls/hr


   Documented by: 


Remdesivir 200 mg/ Sodium (Chloride)  250 mls @ 250 mls/hr IV ONETIME ONE


   Stop: 09/11/20 13:59


   Last Admin: 09/11/20 13:05 Dose:  250 mls/hr


   Documented by: 


Sodium Chloride (Normal Saline)  100 mls @ 75 mls/hr IV ASDIRECTED [facility]


   Last Admin: 09/27/20 10:06 Dose:  75 mls/hr


   Documented by: 


Sodium Chloride (Normal Saline)  1,000 mls @ 100 mls/hr IV ASDIRECTED [facility]


   Stop: 09/27/20 21:29


   Last Admin: 09/27/20 14:05 Dose:  100 mls/hr


   Documented by: 


Ceftriaxone Sodium 2 gm/ (Sodium Chloride)  100 mls @ 200 mls/hr IV Q24H [facility]


   Last Admin: 09/28/20 10:14 Dose:  200 mls/hr


   Documented by: 


Lactated Ringer's (Ringers, Lactated)  1,000 mls @ 100 mls/hr IV ASDIRECTED [facility]


   Last Admin: 09/29/20 03:32 Dose:  100 mls/hr


   Documented by: 


Sodium Phosphate 30 mmole/ (Sodium Chloride)  260 mls @ 86 mls/hr IV ONETIME ONE


   Stop: 09/28/20 17:01


   Last Admin: 09/28/20 14:46 Dose:  86 mls/hr


   Documented by: 


Piperacillin Sod/Tazobactam (Sod 4.5 gm/ Sodium Chloride)  100 mls @ 200 mls/hr 

IV ONETIME ONE


   Stop: 09/29/20 08:59


   Last Admin: 09/29/20 08:39 Dose:  200 mls/hr


   Documented by: 


Piperacillin Sod/Tazobactam (Sod 4.5 gm/ Sodium Chloride)  100 mls @ 25 mls/hr 

IV Q8H [facility]


Ceftriaxone Sodium 2 gm/ (Sodium Chloride)  100 mls @ 200 mls/hr IV Q24H [facility]


   Last Admin: 10/02/20 15:42 Dose:  200 mls/hr


   Documented by: 


Potassium Chloride 10 meq/ (Premix)  100 mls @ 100 mls/hr IV Q1H [facility]


   Stop: 09/29/20 16:29


   Last Admin: 09/29/20 18:24 Dose:  100 mls/hr


   Documented by: 


Lactated Ringer's (Ringers, Lactated)  1,000 mls @ 75 mls/hr IV ASDIRECTED [facility]


   Last Admin: 09/30/20 08:57 Dose:  75 mls/hr


   Documented by: 


Insulin Glargine (Lantus)  10 unit SUBCUT BEDTIME [facility]


   Last Admin: 09/27/20 21:58 Dose:  10 units


   Documented by: 


Insulin Glargine (Lantus)  3 unit SUBCUT DAILY [facility]


   Last Admin: 09/21/20 08:07 Dose:  Not Given


   Documented by: 


Insulin Human Lispro (Humalog)  0 unit SUBCUT QIDACANDBED [facility]; Protocol


   Last Admin: 09/28/20 10:28 Dose:  1 units


   Documented by: 


Iopamidol (Isovue-370 (76%))  100 ml IVPUSH ONETIME ONE


   Stop: 09/27/20 09:31


   Last Admin: 09/27/20 10:05 Dose:  100 ml


   Documented by: 


Potassium Chloride (Klor-Con M20)  40 meq PO Q4H [facility]


   Stop: 09/23/20 16:01


   Last Admin: 09/23/20 17:09 Dose:  40 meq


   Documented by: 


Rivaroxaban (Xarelto)  10 mg PO BEDTIME [facility]


   Last Admin: 09/26/20 21:13 Dose:  10 mg


   Documented by: 











*Q Meaningful Use (DIS)





- VTE *Q


VTE Anticoagulation Contraindications: Medical/Procedure Contrai

## 2020-11-05 NOTE — CR
PROCEDURE INFORMATION:

Exam: XR Chest, 1 View

Exam date and time: 10/1/2020 9:17 AM

Age: 80 years old

Clinical indication: Shortness of breath

TECHNIQUE:

Imaging protocol: XR of the chest

Views: 1 view.

COMPARISON:

DX Chest 2V 9/28/2020 8:42 AM

FINDINGS:

Lungs: Again noted are mild interstitial changes at the periphery of both lungs 
and at the lung bases.

No new pulmonary infiltrate or consolidation.

Pleural space: Unremarkable. No pleural effusion. No pneumothorax.

Heart/Mediastinum: Unremarkable. No cardiomegaly.

Bones/joints: Unremarkable.

IMPRESSION:

Interstitial changes, present previously, most likely represent chronic 
interstitial lung disease. Findings

are stable in the interval. No new findings.

Thank you for allowing us to participate in the care of your patient.

Dictated and Authenticated by: Mike Lujan MD

11/05/2020 12:36 PM Central Time ([place] & [place])

BRYCE